# Patient Record
Sex: MALE | Race: WHITE | NOT HISPANIC OR LATINO | Employment: OTHER | URBAN - METROPOLITAN AREA
[De-identification: names, ages, dates, MRNs, and addresses within clinical notes are randomized per-mention and may not be internally consistent; named-entity substitution may affect disease eponyms.]

---

## 2020-06-08 ENCOUNTER — VIRTUAL VISIT (OUTPATIENT)
Dept: FAMILY MEDICINE | Facility: CLINIC | Age: 85
End: 2020-06-08
Payer: COMMERCIAL

## 2020-06-08 DIAGNOSIS — R60.9 DEPENDENT EDEMA: ICD-10-CM

## 2020-06-08 DIAGNOSIS — M25.571 PAIN IN JOINT INVOLVING ANKLE AND FOOT, RIGHT: Primary | ICD-10-CM

## 2020-06-08 PROCEDURE — 99213 OFFICE O/P EST LOW 20 MIN: CPT | Mod: 95 | Performed by: INTERNAL MEDICINE

## 2020-06-08 NOTE — PROGRESS NOTES
"Emigdio Hannah is a 89 year old male who is being evaluated via a billable telephone visit.      The patient has been notified of following:     \"This telephone visit will be conducted via a call between you and your physician/provider. We have found that certain health care needs can be provided without the need for a physical exam.  This service lets us provide the care you need with a short phone conversation.  If a prescription is necessary we can send it directly to your pharmacy.  If lab work is needed we can place an order for that and you can then stop by our lab to have the test done at a later time.    Telephone visits are billed at different rates depending on your insurance coverage. During this emergency period, for some insurers they may be billed the same as an in-person visit.  Please reach out to your insurance provider with any questions.    If during the course of the call the physician/provider feels a telephone visit is not appropriate, you will not be charged for this service.\"    Patient has given verbal consent for Telephone visit?  Yes    What phone number would you like to be contacted at? [phone]    How would you like to obtain your AVS? Mail a copy    Subjective     Emigdio Hannah is a 89 year old male who presents via phone visit today for the following health issues:    HPI  Joint Pain    Onset: 2 weeks    Description:   Location: right ankle  Character: Dull ache    Intensity: mild    Progression of Symptoms: same    Accompanying Signs & Symptoms:  Other symptoms: none    History:   Previous similar pain: no       Precipitating factors:   Trauma or overuse: no     Alleviating factors:  Improved by: nothing    Therapies Tried and outcome: Nothing       pain no just muscle above  Been rubbing the         Patient Active Problem List   Diagnosis     CARDIOVASCULAR SCREENING; LDL GOAL LESS THAN 160     Advanced directives, counseling/discussion     Squamous cell carcinoma in situ     " Hearing loss     Past Surgical History:   Procedure Laterality Date     CATARACT IOL, RT/LT  5-04 & 6-09    Both eyes     HERNIA REPAIR, INGUINAL RT/LT  4-93    Rt.     SURGICAL PATHOLOGY EXAM  5-9-6    Rt       Social History     Tobacco Use     Smoking status: Never Smoker     Smokeless tobacco: Never Used   Substance Use Topics     Alcohol use: Yes     Comment: once a week, a glass of wine during weekend     No family history on file.      Current Outpatient Medications   Medication Sig Dispense Refill     ascorbic acid (VITAMIN C) 500 MG tablet Take 1 tablet by mouth daily.       Aspirin (ASPIR-81 PO) Take 81 mg by mouth 4 per week.        Calcium Carbonate (CALCIUM 500 PO) Take 3 tablets by mouth daily.       cholecalciferol (VITAMIN D) 400 UNIT TABS Take 400 Units by mouth daily.       COD LIVER OIL PO Take 400 mg by mouth daily.       magnesium 250 MG tablet Take  by mouth daily. 3 per week       Multiple Vitamin (MULTI-VITAMIN) per tablet Take  by mouth daily. 4 per week       omega-3 fatty acids (FISH OIL) 1200 MG capsule Take 1 capsule by mouth daily.       PSYLLIUM PO Take  by mouth. Fiber supplement       amoxicillin (AMOXIL) 500 MG capsule Take 1 capsule (500 mg) by mouth 2 times daily (Patient not taking: Reported on 6/8/2020) 14 capsule 0     No Known Allergies  Recent Labs   Lab Test 04/10/14  0832 08/30/12  1527   LDL 96  --    HDL 59  --    TRIG 72  --    CR 1.09 1.03   GFRESTIMATED 64 68   GFRESTBLACK 77 83   POTASSIUM 4.1 4.3      BP Readings from Last 3 Encounters:   07/31/15 151/81   05/08/15 140/72   04/10/14 151/75    Wt Readings from Last 3 Encounters:   07/31/15 62.6 kg (138 lb)   05/08/15 61.1 kg (134 lb 12 oz)   04/10/14 63 kg (139 lb)                    Reviewed and updated as needed this visit by Provider         Review of Systems   Constitutional, HEENT, cardiovascular, pulmonary, gi and gu systems are negative, except as otherwise noted.       Objective   Reported vitals:  There were  no vitals taken for this visit.   healthy, alert and no distress  PSYCH: Alert and oriented times 3; coherent speech, normal   rate and volume, able to articulate logical thoughts, able   to abstract reason, no tangential thoughts, no hallucinations   or delusions  His affect is normal  RESP: No cough, no audible wheezing, able to talk in full sentences  Remainder of exam unable to be completed due to telephone visits    Diagnostic Test Results:  Labs reviewed in Epic  No results found for any visits on 06/08/20.        ICD-10-CM    1. Pain in joint involving ankle and foot, right  M25.571    2. Dependent edema  R60.9      Wrap in ace bandage, ice, elevated and compress  No obvious signs of clot, however, if worsening or increased pain in the calf. Will need ultrasound  Patient requests water pill, however at this age, non medication manangement is a safer place to start for kideny function and fall risk      Assessment/Plan:  There are no diagnoses linked to this encounter.    No follow-ups on file.      Phone call duration:  12 minutes    George Motley MD

## 2021-03-11 ENCOUNTER — IMMUNIZATION (OUTPATIENT)
Dept: NURSING | Facility: CLINIC | Age: 86
End: 2021-03-11
Payer: COMMERCIAL

## 2021-03-11 PROCEDURE — 91300 PR COVID VAC PFIZER DIL RECON 30 MCG/0.3 ML IM: CPT

## 2021-03-11 PROCEDURE — 0001A PR COVID VAC PFIZER DIL RECON 30 MCG/0.3 ML IM: CPT

## 2021-04-01 ENCOUNTER — IMMUNIZATION (OUTPATIENT)
Dept: NURSING | Facility: CLINIC | Age: 86
End: 2021-04-01
Attending: FAMILY MEDICINE
Payer: COMMERCIAL

## 2021-04-01 PROCEDURE — 91300 PR COVID VAC PFIZER DIL RECON 30 MCG/0.3 ML IM: CPT

## 2021-04-01 PROCEDURE — 0002A PR COVID VAC PFIZER DIL RECON 30 MCG/0.3 ML IM: CPT

## 2023-01-01 ENCOUNTER — NURSE TRIAGE (OUTPATIENT)
Dept: FAMILY MEDICINE | Facility: CLINIC | Age: 88
End: 2023-01-01
Payer: COMMERCIAL

## 2023-01-01 ENCOUNTER — LAB REQUISITION (OUTPATIENT)
Dept: LAB | Facility: CLINIC | Age: 88
End: 2023-01-01
Payer: COMMERCIAL

## 2023-01-01 ENCOUNTER — TELEPHONE (OUTPATIENT)
Dept: DERMATOLOGY | Facility: CLINIC | Age: 88
End: 2023-01-01
Payer: COMMERCIAL

## 2023-01-01 ENCOUNTER — HOSPITAL ENCOUNTER (OUTPATIENT)
Dept: CT IMAGING | Facility: HOSPITAL | Age: 88
Discharge: HOME | End: 2023-11-17
Payer: COMMERCIAL

## 2023-01-01 ENCOUNTER — APPOINTMENT (OUTPATIENT)
Dept: OCCUPATIONAL THERAPY | Facility: CLINIC | Age: 88
DRG: 438 | End: 2023-01-01
Payer: COMMERCIAL

## 2023-01-01 ENCOUNTER — OFFICE VISIT (OUTPATIENT)
Dept: INTERNAL MEDICINE | Facility: CLINIC | Age: 88
End: 2023-01-01
Payer: COMMERCIAL

## 2023-01-01 ENCOUNTER — APPOINTMENT (OUTPATIENT)
Dept: CT IMAGING | Facility: CLINIC | Age: 88
DRG: 438 | End: 2023-01-01
Attending: STUDENT IN AN ORGANIZED HEALTH CARE EDUCATION/TRAINING PROGRAM
Payer: COMMERCIAL

## 2023-01-01 ENCOUNTER — HOSPITAL ENCOUNTER (OUTPATIENT)
Facility: CLINIC | Age: 88
End: 2023-01-01
Attending: INTERNAL MEDICINE | Admitting: INTERNAL MEDICINE
Payer: COMMERCIAL

## 2023-01-01 ENCOUNTER — APPOINTMENT (OUTPATIENT)
Dept: GENERAL RADIOLOGY | Facility: CLINIC | Age: 88
DRG: 438 | End: 2023-01-01
Attending: INTERNAL MEDICINE
Payer: COMMERCIAL

## 2023-01-01 ENCOUNTER — APPOINTMENT (OUTPATIENT)
Dept: ULTRASOUND IMAGING | Facility: CLINIC | Age: 88
DRG: 438 | End: 2023-01-01
Attending: EMERGENCY MEDICINE
Payer: COMMERCIAL

## 2023-01-01 ENCOUNTER — APPOINTMENT (OUTPATIENT)
Dept: CARDIOLOGY | Facility: CLINIC | Age: 88
DRG: 438 | End: 2023-01-01
Payer: COMMERCIAL

## 2023-01-01 ENCOUNTER — APPOINTMENT (OUTPATIENT)
Dept: PHYSICAL THERAPY | Facility: CLINIC | Age: 88
DRG: 438 | End: 2023-01-01
Payer: COMMERCIAL

## 2023-01-01 ENCOUNTER — TELEPHONE (OUTPATIENT)
Dept: INTERNAL MEDICINE | Facility: CLINIC | Age: 88
End: 2023-01-01
Payer: COMMERCIAL

## 2023-01-01 ENCOUNTER — ANESTHESIA EVENT (OUTPATIENT)
Dept: SURGERY | Facility: CLINIC | Age: 88
DRG: 438 | End: 2023-01-01
Payer: COMMERCIAL

## 2023-01-01 ENCOUNTER — HOSPITAL ENCOUNTER (INPATIENT)
Facility: CLINIC | Age: 88
LOS: 7 days | Discharge: NURSING, REHAB OR LONG-TERM CARE | DRG: 438 | End: 2023-12-04
Attending: EMERGENCY MEDICINE | Admitting: INTERNAL MEDICINE
Payer: COMMERCIAL

## 2023-01-01 ENCOUNTER — ANESTHESIA (OUTPATIENT)
Dept: SURGERY | Facility: CLINIC | Age: 88
DRG: 438 | End: 2023-01-01
Payer: COMMERCIAL

## 2023-01-01 ENCOUNTER — PATIENT OUTREACH (OUTPATIENT)
Dept: GASTROENTEROLOGY | Facility: CLINIC | Age: 88
End: 2023-01-01
Payer: COMMERCIAL

## 2023-01-01 ENCOUNTER — APPOINTMENT (OUTPATIENT)
Dept: OCCUPATIONAL THERAPY | Facility: CLINIC | Age: 88
DRG: 438 | End: 2023-01-01
Attending: INTERNAL MEDICINE
Payer: COMMERCIAL

## 2023-01-01 ENCOUNTER — PREP FOR PROCEDURE (OUTPATIENT)
Dept: GASTROENTEROLOGY | Facility: CLINIC | Age: 88
End: 2023-01-01
Payer: COMMERCIAL

## 2023-01-01 ENCOUNTER — PATIENT OUTREACH (OUTPATIENT)
Dept: CARE COORDINATION | Facility: CLINIC | Age: 88
End: 2023-01-01
Payer: COMMERCIAL

## 2023-01-01 ENCOUNTER — APPOINTMENT (OUTPATIENT)
Dept: PHYSICAL THERAPY | Facility: CLINIC | Age: 88
DRG: 438 | End: 2023-01-01
Attending: INTERNAL MEDICINE
Payer: COMMERCIAL

## 2023-01-01 ENCOUNTER — TELEPHONE (OUTPATIENT)
Dept: FAMILY MEDICINE | Facility: CLINIC | Age: 88
End: 2023-01-01
Payer: COMMERCIAL

## 2023-01-01 ENCOUNTER — ANCILLARY ORDERS (OUTPATIENT)
Dept: INTERNAL MEDICINE | Facility: CLINIC | Age: 88
End: 2023-01-01

## 2023-01-01 ENCOUNTER — TELEPHONE (OUTPATIENT)
Dept: FAMILY MEDICINE | Facility: CLINIC | Age: 88
End: 2023-01-01

## 2023-01-01 ENCOUNTER — PATIENT OUTREACH (OUTPATIENT)
Dept: SURGERY | Facility: CLINIC | Age: 88
End: 2023-01-01
Payer: COMMERCIAL

## 2023-01-01 ENCOUNTER — PRE VISIT (OUTPATIENT)
Dept: SURGERY | Facility: CLINIC | Age: 88
End: 2023-01-01

## 2023-01-01 ENCOUNTER — APPOINTMENT (OUTPATIENT)
Dept: ULTRASOUND IMAGING | Facility: CLINIC | Age: 88
DRG: 438 | End: 2023-01-01
Payer: COMMERCIAL

## 2023-01-01 VITALS
OXYGEN SATURATION: 100 % | HEIGHT: 67 IN | SYSTOLIC BLOOD PRESSURE: 138 MMHG | WEIGHT: 124 LBS | BODY MASS INDEX: 19.46 KG/M2 | DIASTOLIC BLOOD PRESSURE: 82 MMHG | HEART RATE: 56 BPM | RESPIRATION RATE: 14 BRPM | TEMPERATURE: 96.8 F

## 2023-01-01 VITALS
TEMPERATURE: 98 F | OXYGEN SATURATION: 99 % | WEIGHT: 127 LBS | DIASTOLIC BLOOD PRESSURE: 58 MMHG | BODY MASS INDEX: 19.89 KG/M2 | SYSTOLIC BLOOD PRESSURE: 156 MMHG | HEART RATE: 71 BPM

## 2023-01-01 VITALS
SYSTOLIC BLOOD PRESSURE: 130 MMHG | BODY MASS INDEX: 20.03 KG/M2 | WEIGHT: 127.87 LBS | TEMPERATURE: 97.4 F | HEART RATE: 91 BPM | OXYGEN SATURATION: 99 % | DIASTOLIC BLOOD PRESSURE: 76 MMHG | RESPIRATION RATE: 16 BRPM

## 2023-01-01 DIAGNOSIS — R17 JAUNDICE: Primary | ICD-10-CM

## 2023-01-01 DIAGNOSIS — N40.1 BENIGN PROSTATIC HYPERPLASIA WITH URINARY OBSTRUCTION: ICD-10-CM

## 2023-01-01 DIAGNOSIS — Z23 NEED FOR TDAP VACCINATION: ICD-10-CM

## 2023-01-01 DIAGNOSIS — Z00.00 ROUTINE GENERAL MEDICAL EXAMINATION AT A HEALTH CARE FACILITY: ICD-10-CM

## 2023-01-01 DIAGNOSIS — R17 JAUNDICE: ICD-10-CM

## 2023-01-01 DIAGNOSIS — D64.89 OTHER SPECIFIED ANEMIAS: ICD-10-CM

## 2023-01-01 DIAGNOSIS — Z23 NEED FOR SHINGLES VACCINE: ICD-10-CM

## 2023-01-01 DIAGNOSIS — N13.8 BENIGN PROSTATIC HYPERPLASIA WITH URINARY OBSTRUCTION: ICD-10-CM

## 2023-01-01 DIAGNOSIS — K83.8 COMMON BILE DUCT MASS: Primary | ICD-10-CM

## 2023-01-01 DIAGNOSIS — K59.00 CONSTIPATION, UNSPECIFIED CONSTIPATION TYPE: Primary | ICD-10-CM

## 2023-01-01 DIAGNOSIS — Z29.11 NEED FOR VACCINATION AGAINST RESPIRATORY SYNCYTIAL VIRUS: ICD-10-CM

## 2023-01-01 DIAGNOSIS — R17 ELEVATED BILIRUBIN: Primary | ICD-10-CM

## 2023-01-01 DIAGNOSIS — R74.8 ABNORMAL LEVELS OF OTHER SERUM ENZYMES: ICD-10-CM

## 2023-01-01 DIAGNOSIS — I87.2 VENOUS STASIS DERMATITIS OF BOTH LOWER EXTREMITIES: ICD-10-CM

## 2023-01-01 DIAGNOSIS — R17 ELEVATED BILIRUBIN: ICD-10-CM

## 2023-01-01 DIAGNOSIS — R60.0 LOWER EXTREMITY EDEMA: ICD-10-CM

## 2023-01-01 DIAGNOSIS — R53.1 GENERALIZED WEAKNESS: ICD-10-CM

## 2023-01-01 LAB
ALBUMIN SERPL BCG-MCNC: 2.3 G/DL (ref 3.5–5.2)
ALBUMIN SERPL BCG-MCNC: 2.4 G/DL (ref 3.5–5.2)
ALBUMIN SERPL BCG-MCNC: 2.6 G/DL (ref 3.5–5.2)
ALBUMIN SERPL BCG-MCNC: 3.2 G/DL (ref 3.5–5.2)
ALBUMIN SERPL BCG-MCNC: 3.4 G/DL (ref 3.5–5.2)
ALBUMIN SERPL BCG-MCNC: 4 G/DL (ref 3.5–5.2)
ALBUMIN SERPL BCG-MCNC: 4 G/DL (ref 3.5–5.2)
ALBUMIN UR-MCNC: NEGATIVE MG/DL
ALP SERPL-CCNC: 1074 U/L (ref 40–150)
ALP SERPL-CCNC: 159 U/L (ref 40–150)
ALP SERPL-CCNC: 195 U/L (ref 40–150)
ALP SERPL-CCNC: 197 U/L (ref 40–150)
ALP SERPL-CCNC: 220 U/L (ref 40–150)
ALP SERPL-CCNC: 234 U/L (ref 40–150)
ALP SERPL-CCNC: 242 U/L (ref 40–150)
ALP SERPL-CCNC: 261 U/L (ref 40–150)
ALP SERPL-CCNC: 261 U/L (ref 40–150)
ALP SERPL-CCNC: 325 U/L (ref 40–150)
ALP SERPL-CCNC: 348 U/L (ref 40–150)
ALP SERPL-CCNC: 454 U/L (ref 40–150)
ALT SERPL W P-5'-P-CCNC: 19 U/L (ref 0–70)
ALT SERPL W P-5'-P-CCNC: 221 U/L (ref 0–70)
ALT SERPL W P-5'-P-CCNC: 27 U/L (ref 0–70)
ALT SERPL W P-5'-P-CCNC: 30 U/L (ref 0–70)
ALT SERPL W P-5'-P-CCNC: 30 U/L (ref 0–70)
ALT SERPL W P-5'-P-CCNC: 31 U/L (ref 0–70)
ALT SERPL W P-5'-P-CCNC: 34 U/L (ref 0–70)
ALT SERPL W P-5'-P-CCNC: 35 U/L (ref 0–70)
ALT SERPL W P-5'-P-CCNC: 44 U/L (ref 0–70)
ALT SERPL W P-5'-P-CCNC: 49 U/L (ref 0–70)
ALT SERPL W P-5'-P-CCNC: 55 U/L (ref 0–70)
ALT SERPL W P-5'-P-CCNC: 73 U/L (ref 0–70)
AMMONIA PLAS-SCNC: 27 UMOL/L (ref 16–60)
ANION GAP SERPL CALCULATED.3IONS-SCNC: 10 MMOL/L (ref 7–15)
ANION GAP SERPL CALCULATED.3IONS-SCNC: 11 MMOL/L (ref 7–15)
ANION GAP SERPL CALCULATED.3IONS-SCNC: 11 MMOL/L (ref 7–15)
ANION GAP SERPL CALCULATED.3IONS-SCNC: 12 MMOL/L (ref 7–15)
ANION GAP SERPL CALCULATED.3IONS-SCNC: 12 MMOL/L (ref 7–15)
ANION GAP SERPL CALCULATED.3IONS-SCNC: 15 MMOL/L (ref 7–15)
ANION GAP SERPL CALCULATED.3IONS-SCNC: 6 MMOL/L (ref 7–15)
ANION GAP SERPL CALCULATED.3IONS-SCNC: 7 MMOL/L (ref 7–15)
ANION GAP SERPL CALCULATED.3IONS-SCNC: 7 MMOL/L (ref 7–15)
ANION GAP SERPL CALCULATED.3IONS-SCNC: 8 MMOL/L (ref 7–15)
ANION GAP SERPL CALCULATED.3IONS-SCNC: 9 MMOL/L (ref 7–15)
APPEARANCE UR: CLEAR
APTT PPP: 36 SECONDS (ref 22–38)
AST SERPL W P-5'-P-CCNC: 174 U/L (ref 0–45)
AST SERPL W P-5'-P-CCNC: 25 U/L (ref 0–45)
AST SERPL W P-5'-P-CCNC: 26 U/L (ref 0–45)
AST SERPL W P-5'-P-CCNC: 27 U/L (ref 0–45)
AST SERPL W P-5'-P-CCNC: 28 U/L (ref 0–45)
AST SERPL W P-5'-P-CCNC: 29 U/L (ref 0–45)
AST SERPL W P-5'-P-CCNC: 30 U/L (ref 0–45)
AST SERPL W P-5'-P-CCNC: 30 U/L (ref 0–45)
AST SERPL W P-5'-P-CCNC: 35 U/L (ref 0–45)
AST SERPL W P-5'-P-CCNC: 44 U/L (ref 0–45)
ATRIAL RATE - MUSE: 78 BPM
ATRIAL RATE - MUSE: 81 BPM
ATRIAL RATE - MUSE: 87 BPM
BACTERIA BLD CULT: NO GROWTH
BACTERIA BLD CULT: NO GROWTH
BASOPHILS # BLD AUTO: 0 10E3/UL (ref 0–0.2)
BASOPHILS # BLD AUTO: 0.1 10E3/UL (ref 0–0.2)
BASOPHILS NFR BLD AUTO: 0 %
BASOPHILS NFR BLD AUTO: 1 %
BI-PLANE LVEF ECHO: NORMAL
BILIRUB SERPL-MCNC: 1.6 MG/DL
BILIRUB SERPL-MCNC: 18.1 MG/DL
BILIRUB SERPL-MCNC: 2.5 MG/DL
BILIRUB SERPL-MCNC: 2.5 MG/DL
BILIRUB SERPL-MCNC: 2.6 MG/DL
BILIRUB SERPL-MCNC: 2.8 MG/DL
BILIRUB SERPL-MCNC: 3.1 MG/DL
BILIRUB SERPL-MCNC: 3.6 MG/DL
BILIRUB SERPL-MCNC: 3.7 MG/DL
BILIRUB SERPL-MCNC: 4.6 MG/DL
BILIRUB SERPL-MCNC: 4.9 MG/DL
BILIRUB SERPL-MCNC: 6.4 MG/DL
BILIRUB UR QL STRIP: NEGATIVE
BUN SERPL-MCNC: 18.7 MG/DL (ref 8–23)
BUN SERPL-MCNC: 19.6 MG/DL (ref 8–23)
BUN SERPL-MCNC: 22.8 MG/DL (ref 8–23)
BUN SERPL-MCNC: 23.9 MG/DL (ref 8–23)
BUN SERPL-MCNC: 24.4 MG/DL (ref 8–23)
BUN SERPL-MCNC: 25.3 MG/DL (ref 8–23)
BUN SERPL-MCNC: 29.5 MG/DL (ref 8–23)
BUN SERPL-MCNC: 31.5 MG/DL (ref 8–23)
BUN SERPL-MCNC: 44 MG/DL (ref 8–23)
BUN SERPL-MCNC: 44.1 MG/DL (ref 8–23)
BUN SERPL-MCNC: 63.6 MG/DL (ref 8–23)
BUN SERPL-MCNC: 66.6 MG/DL (ref 8–23)
BUN SERPL-MCNC: 71.5 MG/DL (ref 8–23)
CALCIUM SERPL-MCNC: 7.6 MG/DL (ref 8.2–9.6)
CALCIUM SERPL-MCNC: 7.6 MG/DL (ref 8.2–9.6)
CALCIUM SERPL-MCNC: 7.7 MG/DL (ref 8.2–9.6)
CALCIUM SERPL-MCNC: 7.8 MG/DL (ref 8.2–9.6)
CALCIUM SERPL-MCNC: 8.2 MG/DL (ref 8.2–9.6)
CALCIUM SERPL-MCNC: 8.2 MG/DL (ref 8.2–9.6)
CALCIUM SERPL-MCNC: 8.5 MG/DL (ref 8.2–9.6)
CALCIUM SERPL-MCNC: 8.5 MG/DL (ref 8.2–9.6)
CALCIUM SERPL-MCNC: 8.6 MG/DL (ref 8.2–9.6)
CALCIUM SERPL-MCNC: 9.1 MG/DL (ref 8.2–9.6)
CALCIUM SERPL-MCNC: 9.2 MG/DL (ref 8.2–9.6)
CALCIUM SERPL-MCNC: 9.4 MG/DL (ref 8.2–9.6)
CALCIUM SERPL-MCNC: 9.7 MG/DL (ref 8.2–9.6)
CHLORIDE SERPL-SCNC: 100 MMOL/L (ref 98–107)
CHLORIDE SERPL-SCNC: 101 MMOL/L (ref 98–107)
CHLORIDE SERPL-SCNC: 102 MMOL/L (ref 98–107)
CHLORIDE SERPL-SCNC: 104 MMOL/L (ref 98–107)
CHLORIDE SERPL-SCNC: 104 MMOL/L (ref 98–107)
CHLORIDE SERPL-SCNC: 105 MMOL/L (ref 98–107)
CHLORIDE SERPL-SCNC: 106 MMOL/L (ref 98–107)
CHLORIDE SERPL-SCNC: 106 MMOL/L (ref 98–107)
CHLORIDE SERPL-SCNC: 107 MMOL/L (ref 98–107)
CHLORIDE SERPL-SCNC: 107 MMOL/L (ref 98–107)
CHLORIDE SERPL-SCNC: 108 MMOL/L (ref 98–107)
CHLORIDE SERPL-SCNC: 108 MMOL/L (ref 98–107)
CHLORIDE SERPL-SCNC: 98 MMOL/L (ref 98–107)
CHOLEST SERPL-MCNC: NORMAL MG/DL
COLOR UR AUTO: YELLOW
CREAT SERPL-MCNC: 0.75 MG/DL (ref 0.67–1.17)
CREAT SERPL-MCNC: 0.8 MG/DL (ref 0.67–1.17)
CREAT SERPL-MCNC: 0.87 MG/DL (ref 0.67–1.17)
CREAT SERPL-MCNC: 0.9 MG/DL (ref 0.67–1.17)
CREAT SERPL-MCNC: 0.91 MG/DL (ref 0.67–1.17)
CREAT SERPL-MCNC: 0.95 MG/DL (ref 0.67–1.17)
CREAT SERPL-MCNC: 0.95 MG/DL (ref 0.67–1.17)
CREAT SERPL-MCNC: 0.96 MG/DL (ref 0.67–1.17)
CREAT SERPL-MCNC: 1.34 MG/DL (ref 0.67–1.17)
CREAT SERPL-MCNC: 1.37 MG/DL (ref 0.67–1.17)
CREAT SERPL-MCNC: 2.36 MG/DL (ref 0.67–1.17)
CREAT SERPL-MCNC: 2.83 MG/DL (ref 0.67–1.17)
CREAT SERPL-MCNC: 2.93 MG/DL (ref 0.67–1.17)
DEPRECATED HCO3 PLAS-SCNC: 23 MMOL/L (ref 22–29)
DEPRECATED HCO3 PLAS-SCNC: 24 MMOL/L (ref 22–29)
DEPRECATED HCO3 PLAS-SCNC: 25 MMOL/L (ref 22–29)
DEPRECATED HCO3 PLAS-SCNC: 25 MMOL/L (ref 22–29)
DEPRECATED HCO3 PLAS-SCNC: 26 MMOL/L (ref 22–29)
DEPRECATED HCO3 PLAS-SCNC: 27 MMOL/L (ref 22–29)
DEPRECATED HCO3 PLAS-SCNC: 28 MMOL/L (ref 22–29)
DEPRECATED HCO3 PLAS-SCNC: 28 MMOL/L (ref 22–29)
DIASTOLIC BLOOD PRESSURE - MUSE: NORMAL MMHG
EGFRCR SERPLBLD CKD-EPI 2021: 19 ML/MIN/1.73M2
EGFRCR SERPLBLD CKD-EPI 2021: 20 ML/MIN/1.73M2
EGFRCR SERPLBLD CKD-EPI 2021: 24 ML/MIN/1.73M2
EGFRCR SERPLBLD CKD-EPI 2021: 47 ML/MIN/1.73M2
EGFRCR SERPLBLD CKD-EPI 2021: 48 ML/MIN/1.73M2
EGFRCR SERPLBLD CKD-EPI 2021: 72 ML/MIN/1.73M2
EGFRCR SERPLBLD CKD-EPI 2021: 73 ML/MIN/1.73M2
EGFRCR SERPLBLD CKD-EPI 2021: 73 ML/MIN/1.73M2
EGFRCR SERPLBLD CKD-EPI 2021: 77 ML/MIN/1.73M2
EGFRCR SERPLBLD CKD-EPI 2021: 78 ML/MIN/1.73M2
EGFRCR SERPLBLD CKD-EPI 2021: 78 ML/MIN/1.73M2
EGFRCR SERPLBLD CKD-EPI 2021: 80 ML/MIN/1.73M2
EGFRCR SERPLBLD CKD-EPI 2021: 82 ML/MIN/1.73M2
EOSINOPHIL # BLD AUTO: 0 10E3/UL (ref 0–0.7)
EOSINOPHIL # BLD AUTO: 0 10E3/UL (ref 0–0.7)
EOSINOPHIL # BLD AUTO: 0.2 10E3/UL (ref 0–0.7)
EOSINOPHIL # BLD AUTO: 0.5 10E3/UL (ref 0–0.7)
EOSINOPHIL NFR BLD AUTO: 0 %
EOSINOPHIL NFR BLD AUTO: 1 %
EOSINOPHIL NFR BLD AUTO: 4 %
EOSINOPHIL NFR BLD AUTO: 8 %
ERYTHROCYTE [DISTWIDTH] IN BLOOD BY AUTOMATED COUNT: 14.4 % (ref 10–15)
ERYTHROCYTE [DISTWIDTH] IN BLOOD BY AUTOMATED COUNT: 15.3 % (ref 10–15)
ERYTHROCYTE [DISTWIDTH] IN BLOOD BY AUTOMATED COUNT: 17.2 % (ref 10–15)
ERYTHROCYTE [DISTWIDTH] IN BLOOD BY AUTOMATED COUNT: 17.2 % (ref 10–15)
ERYTHROCYTE [DISTWIDTH] IN BLOOD BY AUTOMATED COUNT: 17.3 % (ref 10–15)
ERYTHROCYTE [DISTWIDTH] IN BLOOD BY AUTOMATED COUNT: 17.4 % (ref 10–15)
ERYTHROCYTE [DISTWIDTH] IN BLOOD BY AUTOMATED COUNT: 17.6 % (ref 10–15)
ERYTHROCYTE [DISTWIDTH] IN BLOOD BY AUTOMATED COUNT: 18.8 % (ref 10–15)
GLUCOSE SERPL-MCNC: 100 MG/DL (ref 70–99)
GLUCOSE SERPL-MCNC: 100 MG/DL (ref 70–99)
GLUCOSE SERPL-MCNC: 103 MG/DL (ref 70–99)
GLUCOSE SERPL-MCNC: 103 MG/DL (ref 70–99)
GLUCOSE SERPL-MCNC: 109 MG/DL (ref 70–99)
GLUCOSE SERPL-MCNC: 110 MG/DL (ref 70–99)
GLUCOSE SERPL-MCNC: 111 MG/DL (ref 70–99)
GLUCOSE SERPL-MCNC: 113 MG/DL (ref 70–99)
GLUCOSE SERPL-MCNC: 113 MG/DL (ref 70–99)
GLUCOSE SERPL-MCNC: 126 MG/DL (ref 70–99)
GLUCOSE SERPL-MCNC: 144 MG/DL (ref 70–99)
GLUCOSE SERPL-MCNC: 236 MG/DL (ref 70–99)
GLUCOSE SERPL-MCNC: 88 MG/DL (ref 70–99)
GLUCOSE UR STRIP-MCNC: NEGATIVE MG/DL
HBA1C MFR BLD: 6.1 %
HCT VFR BLD AUTO: 26.3 % (ref 40–53)
HCT VFR BLD AUTO: 27.4 % (ref 40–53)
HCT VFR BLD AUTO: 28.7 % (ref 40–53)
HCT VFR BLD AUTO: 29.8 % (ref 40–53)
HCT VFR BLD AUTO: 31.9 % (ref 40–53)
HCT VFR BLD AUTO: 31.9 % (ref 40–53)
HCT VFR BLD AUTO: 32.2 % (ref 40–53)
HCT VFR BLD AUTO: 33.5 % (ref 40–53)
HDLC SERPL-MCNC: 59 MG/DL
HGB BLD-MCNC: 10.1 G/DL (ref 13.3–17.7)
HGB BLD-MCNC: 10.4 G/DL (ref 13.3–17.7)
HGB BLD-MCNC: 11.1 G/DL (ref 13.3–17.7)
HGB BLD-MCNC: 11.3 G/DL (ref 13.3–17.7)
HGB BLD-MCNC: 8.7 G/DL (ref 13.3–17.7)
HGB BLD-MCNC: 8.8 G/DL (ref 13.3–17.7)
HGB BLD-MCNC: 9.1 G/DL (ref 13.3–17.7)
HGB BLD-MCNC: 9.8 G/DL (ref 13.3–17.7)
HGB UR QL STRIP: NEGATIVE
HOLD SPECIMEN: NORMAL
IMM GRANULOCYTES # BLD: 0 10E3/UL
IMM GRANULOCYTES # BLD: 0.1 10E3/UL
IMM GRANULOCYTES NFR BLD: 1 %
IMM GRANULOCYTES NFR BLD: 1 %
IMM GRANULOCYTES NFR BLD: 2 %
IMM GRANULOCYTES NFR BLD: 2 %
INR PPP: 1.28 (ref 0.85–1.15)
INR PPP: 1.31 (ref 0.85–1.15)
INTERPRETATION ECG - MUSE: NORMAL
KETONES UR STRIP-MCNC: NEGATIVE MG/DL
LACTATE SERPL-SCNC: 1.3 MMOL/L (ref 0.7–2)
LACTATE SERPL-SCNC: 1.9 MMOL/L (ref 0.7–2)
LDLC SERPL CALC-MCNC: NORMAL MG/DL
LEUKOCYTE ESTERASE UR QL STRIP: NEGATIVE
LIPASE SERPL-CCNC: 37 U/L (ref 13–60)
LIPASE SERPL-CCNC: 43 U/L (ref 13–60)
LYMPHOCYTES # BLD AUTO: 0.2 10E3/UL (ref 0.8–5.3)
LYMPHOCYTES # BLD AUTO: 0.6 10E3/UL (ref 0.8–5.3)
LYMPHOCYTES # BLD AUTO: 0.7 10E3/UL (ref 0.8–5.3)
LYMPHOCYTES # BLD AUTO: 0.9 10E3/UL (ref 0.8–5.3)
LYMPHOCYTES NFR BLD AUTO: 1 %
LYMPHOCYTES NFR BLD AUTO: 12 %
LYMPHOCYTES NFR BLD AUTO: 12 %
LYMPHOCYTES NFR BLD AUTO: 16 %
MAGNESIUM SERPL-MCNC: 1.7 MG/DL (ref 1.7–2.3)
MAGNESIUM SERPL-MCNC: 1.9 MG/DL (ref 1.7–2.3)
MAGNESIUM SERPL-MCNC: 2.8 MG/DL (ref 1.7–2.3)
MCH RBC QN AUTO: 29.7 PG (ref 26.5–33)
MCH RBC QN AUTO: 30.6 PG (ref 26.5–33)
MCH RBC QN AUTO: 30.7 PG (ref 26.5–33)
MCH RBC QN AUTO: 30.7 PG (ref 26.5–33)
MCH RBC QN AUTO: 30.9 PG (ref 26.5–33)
MCH RBC QN AUTO: 31 PG (ref 26.5–33)
MCH RBC QN AUTO: 31.2 PG (ref 26.5–33)
MCH RBC QN AUTO: 31.3 PG (ref 26.5–33)
MCHC RBC AUTO-ENTMCNC: 31.4 G/DL (ref 31.5–36.5)
MCHC RBC AUTO-ENTMCNC: 31.7 G/DL (ref 31.5–36.5)
MCHC RBC AUTO-ENTMCNC: 32.1 G/DL (ref 31.5–36.5)
MCHC RBC AUTO-ENTMCNC: 32.6 G/DL (ref 31.5–36.5)
MCHC RBC AUTO-ENTMCNC: 32.9 G/DL (ref 31.5–36.5)
MCHC RBC AUTO-ENTMCNC: 33.1 G/DL (ref 31.5–36.5)
MCHC RBC AUTO-ENTMCNC: 33.1 G/DL (ref 31.5–36.5)
MCHC RBC AUTO-ENTMCNC: 35.4 G/DL (ref 31.5–36.5)
MCV RBC AUTO: 87 FL (ref 78–100)
MCV RBC AUTO: 93 FL (ref 78–100)
MCV RBC AUTO: 94 FL (ref 78–100)
MCV RBC AUTO: 94 FL (ref 78–100)
MCV RBC AUTO: 95 FL (ref 78–100)
MCV RBC AUTO: 95 FL (ref 78–100)
MCV RBC AUTO: 97 FL (ref 78–100)
MCV RBC AUTO: 97 FL (ref 78–100)
MONOCYTES # BLD AUTO: 0.6 10E3/UL (ref 0–1.3)
MONOCYTES # BLD AUTO: 0.6 10E3/UL (ref 0–1.3)
MONOCYTES # BLD AUTO: 0.8 10E3/UL (ref 0–1.3)
MONOCYTES # BLD AUTO: 0.8 10E3/UL (ref 0–1.3)
MONOCYTES NFR BLD AUTO: 13 %
MONOCYTES NFR BLD AUTO: 13 %
MONOCYTES NFR BLD AUTO: 15 %
MONOCYTES NFR BLD AUTO: 4 %
MRSA DNA SPEC QL NAA+PROBE: NEGATIVE
MUCOUS THREADS #/AREA URNS LPF: PRESENT /LPF
NEUTROPHILS # BLD AUTO: 13.4 10E3/UL (ref 1.6–8.3)
NEUTROPHILS # BLD AUTO: 3.5 10E3/UL (ref 1.6–8.3)
NEUTROPHILS # BLD AUTO: 3.5 10E3/UL (ref 1.6–8.3)
NEUTROPHILS # BLD AUTO: 3.9 10E3/UL (ref 1.6–8.3)
NEUTROPHILS NFR BLD AUTO: 60 %
NEUTROPHILS NFR BLD AUTO: 66 %
NEUTROPHILS NFR BLD AUTO: 72 %
NEUTROPHILS NFR BLD AUTO: 94 %
NITRATE UR QL: NEGATIVE
NONHDLC SERPL-MCNC: NORMAL MG/DL
NRBC # BLD AUTO: 0 10E3/UL
NRBC BLD AUTO-RTO: 0 /100
P AXIS - MUSE: 68 DEGREES
P AXIS - MUSE: 80 DEGREES
P AXIS - MUSE: 83 DEGREES
PH UR STRIP: 5 [PH] (ref 5–7)
PHOSPHATE SERPL-MCNC: 1.9 MG/DL (ref 2.5–4.5)
PHOSPHATE SERPL-MCNC: 2.3 MG/DL (ref 2.5–4.5)
PHOSPHATE SERPL-MCNC: 2.4 MG/DL (ref 2.5–4.5)
PHOSPHATE SERPL-MCNC: 2.5 MG/DL (ref 2.5–4.5)
PHOSPHATE SERPL-MCNC: 2.7 MG/DL (ref 2.5–4.5)
PLATELET # BLD AUTO: 148 10E3/UL (ref 150–450)
PLATELET # BLD AUTO: 154 10E3/UL (ref 150–450)
PLATELET # BLD AUTO: 154 10E3/UL (ref 150–450)
PLATELET # BLD AUTO: 159 10E3/UL (ref 150–450)
PLATELET # BLD AUTO: 169 10E3/UL (ref 150–450)
PLATELET # BLD AUTO: 181 10E3/UL (ref 150–450)
PLATELET # BLD AUTO: 181 10E3/UL (ref 150–450)
PLATELET # BLD AUTO: 211 10E3/UL (ref 150–450)
PLATELET # BLD AUTO: 248 10E3/UL (ref 150–450)
PLATELET # BLD AUTO: 294 10E3/UL (ref 150–450)
POTASSIUM SERPL-SCNC: 3.5 MMOL/L (ref 3.4–5.3)
POTASSIUM SERPL-SCNC: 3.7 MMOL/L (ref 3.4–5.3)
POTASSIUM SERPL-SCNC: 3.7 MMOL/L (ref 3.4–5.3)
POTASSIUM SERPL-SCNC: 3.8 MMOL/L (ref 3.4–5.3)
POTASSIUM SERPL-SCNC: 4.1 MMOL/L (ref 3.4–5.3)
POTASSIUM SERPL-SCNC: 4.2 MMOL/L (ref 3.4–5.3)
POTASSIUM SERPL-SCNC: 4.2 MMOL/L (ref 3.4–5.3)
POTASSIUM SERPL-SCNC: 4.3 MMOL/L (ref 3.4–5.3)
POTASSIUM SERPL-SCNC: 4.3 MMOL/L (ref 3.4–5.3)
POTASSIUM SERPL-SCNC: 4.4 MMOL/L (ref 3.4–5.3)
POTASSIUM SERPL-SCNC: 4.5 MMOL/L (ref 3.4–5.3)
POTASSIUM SERPL-SCNC: 4.8 MMOL/L (ref 3.4–5.3)
POTASSIUM SERPL-SCNC: 5 MMOL/L (ref 3.4–5.3)
PR INTERVAL - MUSE: 134 MS
PR INTERVAL - MUSE: 148 MS
PR INTERVAL - MUSE: 158 MS
PROT SERPL-MCNC: 4.6 G/DL (ref 6.4–8.3)
PROT SERPL-MCNC: 4.8 G/DL (ref 6.4–8.3)
PROT SERPL-MCNC: 4.8 G/DL (ref 6.4–8.3)
PROT SERPL-MCNC: 4.9 G/DL (ref 6.4–8.3)
PROT SERPL-MCNC: 4.9 G/DL (ref 6.4–8.3)
PROT SERPL-MCNC: 5.2 G/DL (ref 6.4–8.3)
PROT SERPL-MCNC: 5.3 G/DL (ref 6.4–8.3)
PROT SERPL-MCNC: 5.5 G/DL (ref 6.4–8.3)
PROT SERPL-MCNC: 6.3 G/DL (ref 6.4–8.3)
PROT SERPL-MCNC: 6.6 G/DL (ref 6.4–8.3)
PROT SERPL-MCNC: 8 G/DL (ref 6.4–8.3)
PROT SERPL-MCNC: ABNORMAL G/DL
QRS DURATION - MUSE: 120 MS
QRS DURATION - MUSE: 138 MS
QRS DURATION - MUSE: 142 MS
QT - MUSE: 420 MS
QT - MUSE: 432 MS
QT - MUSE: 448 MS
QTC - MUSE: 501 MS
QTC - MUSE: 505 MS
QTC - MUSE: 510 MS
R AXIS - MUSE: -61 DEGREES
R AXIS - MUSE: -64 DEGREES
R AXIS - MUSE: -64 DEGREES
RBC # BLD AUTO: 2.82 10E6/UL (ref 4.4–5.9)
RBC # BLD AUTO: 2.83 10E6/UL (ref 4.4–5.9)
RBC # BLD AUTO: 2.97 10E6/UL (ref 4.4–5.9)
RBC # BLD AUTO: 3.16 10E6/UL (ref 4.4–5.9)
RBC # BLD AUTO: 3.37 10E6/UL (ref 4.4–5.9)
RBC # BLD AUTO: 3.4 10E6/UL (ref 4.4–5.9)
RBC # BLD AUTO: 3.55 10E6/UL (ref 4.4–5.9)
RBC # BLD AUTO: 3.68 10E6/UL (ref 4.4–5.9)
RBC URINE: 0 /HPF
SA TARGET DNA: POSITIVE
SODIUM SERPL-SCNC: 136 MMOL/L (ref 135–145)
SODIUM SERPL-SCNC: 136 MMOL/L (ref 135–145)
SODIUM SERPL-SCNC: 137 MMOL/L (ref 135–145)
SODIUM SERPL-SCNC: 137 MMOL/L (ref 135–145)
SODIUM SERPL-SCNC: 139 MMOL/L (ref 135–145)
SODIUM SERPL-SCNC: 139 MMOL/L (ref 135–145)
SODIUM SERPL-SCNC: 141 MMOL/L (ref 135–145)
SODIUM SERPL-SCNC: 142 MMOL/L (ref 135–145)
SODIUM SERPL-SCNC: 142 MMOL/L (ref 135–145)
SODIUM SERPL-SCNC: 143 MMOL/L (ref 135–145)
SODIUM SERPL-SCNC: 144 MMOL/L (ref 135–145)
SP GR UR STRIP: 1.02 (ref 1–1.03)
SYSTOLIC BLOOD PRESSURE - MUSE: NORMAL MMHG
T AXIS - MUSE: 31 DEGREES
T AXIS - MUSE: 82 DEGREES
T AXIS - MUSE: 97 DEGREES
TRIGL SERPL-MCNC: NORMAL MG/DL
TROPONIN T SERPL HS-MCNC: 35 NG/L
TROPONIN T SERPL HS-MCNC: 36 NG/L
UPPER EUS: NORMAL
UROBILINOGEN UR STRIP-MCNC: NORMAL MG/DL
VANCOMYCIN SERPL-MCNC: 6.4 UG/ML
VENTRICULAR RATE- MUSE: 78 BPM
VENTRICULAR RATE- MUSE: 81 BPM
VENTRICULAR RATE- MUSE: 87 BPM
WBC # BLD AUTO: 11.1 10E3/UL (ref 4–11)
WBC # BLD AUTO: 12 10E3/UL (ref 4–11)
WBC # BLD AUTO: 12.4 10E3/UL (ref 4–11)
WBC # BLD AUTO: 12.6 10E3/UL (ref 4–11)
WBC # BLD AUTO: 14.3 10E3/UL (ref 4–11)
WBC # BLD AUTO: 4.7 10E3/UL (ref 4–11)
WBC # BLD AUTO: 5.7 10E3/UL (ref 4–11)
WBC # BLD AUTO: 5.8 10E3/UL (ref 4–11)
WBC URINE: 1 /HPF

## 2023-01-01 PROCEDURE — 360N000075 HC SURGERY LEVEL 2, PER MIN: Performed by: INTERNAL MEDICINE

## 2023-01-01 PROCEDURE — 93010 ELECTROCARDIOGRAM REPORT: CPT | Performed by: INTERNAL MEDICINE

## 2023-01-01 PROCEDURE — 80053 COMPREHEN METABOLIC PANEL: CPT | Performed by: EMERGENCY MEDICINE

## 2023-01-01 PROCEDURE — 258N000003 HC RX IP 258 OP 636: Performed by: STUDENT IN AN ORGANIZED HEALTH CARE EDUCATION/TRAINING PROGRAM

## 2023-01-01 PROCEDURE — 250N000013 HC RX MED GY IP 250 OP 250 PS 637: Performed by: STUDENT IN AN ORGANIZED HEALTH CARE EDUCATION/TRAINING PROGRAM

## 2023-01-01 PROCEDURE — 97530 THERAPEUTIC ACTIVITIES: CPT | Mod: GP | Performed by: PHYSICAL THERAPIST

## 2023-01-01 PROCEDURE — 97165 OT EVAL LOW COMPLEX 30 MIN: CPT | Mod: GO

## 2023-01-01 PROCEDURE — 36415 COLL VENOUS BLD VENIPUNCTURE: CPT

## 2023-01-01 PROCEDURE — 99207 PR APP CREDIT; MD BILLING SHARED VISIT: CPT | Mod: FS | Performed by: STUDENT IN AN ORGANIZED HEALTH CARE EDUCATION/TRAINING PROGRAM

## 2023-01-01 PROCEDURE — 99239 HOSP IP/OBS DSCHRG MGMT >30: CPT | Performed by: PHYSICIAN ASSISTANT

## 2023-01-01 PROCEDURE — 250N000011 HC RX IP 250 OP 636: Mod: JZ | Performed by: STUDENT IN AN ORGANIZED HEALTH CARE EDUCATION/TRAINING PROGRAM

## 2023-01-01 PROCEDURE — 84460 ALANINE AMINO (ALT) (SGPT): CPT | Performed by: DIETITIAN, REGISTERED

## 2023-01-01 PROCEDURE — 36415 COLL VENOUS BLD VENIPUNCTURE: CPT | Performed by: EMERGENCY MEDICINE

## 2023-01-01 PROCEDURE — G0463 HOSPITAL OUTPT CLINIC VISIT: HCPCS

## 2023-01-01 PROCEDURE — 250N000011 HC RX IP 250 OP 636

## 2023-01-01 PROCEDURE — 85049 AUTOMATED PLATELET COUNT: CPT | Performed by: INTERNAL MEDICINE

## 2023-01-01 PROCEDURE — 82247 BILIRUBIN TOTAL: CPT

## 2023-01-01 PROCEDURE — 120N000005 HC R&B MS OVERFLOW UMMC

## 2023-01-01 PROCEDURE — 258N000003 HC RX IP 258 OP 636: Performed by: INTERNAL MEDICINE

## 2023-01-01 PROCEDURE — 74176 CT ABD & PELVIS W/O CONTRAST: CPT

## 2023-01-01 PROCEDURE — 83735 ASSAY OF MAGNESIUM: CPT | Performed by: EMERGENCY MEDICINE

## 2023-01-01 PROCEDURE — 272N000001 HC OR GENERAL SUPPLY STERILE: Performed by: INTERNAL MEDICINE

## 2023-01-01 PROCEDURE — 85610 PROTHROMBIN TIME: CPT | Performed by: INTERNAL MEDICINE

## 2023-01-01 PROCEDURE — 87641 MR-STAPH DNA AMP PROBE: CPT

## 2023-01-01 PROCEDURE — 255N000002 HC RX 255 OP 636: Performed by: INTERNAL MEDICINE

## 2023-01-01 PROCEDURE — 93005 ELECTROCARDIOGRAM TRACING: CPT

## 2023-01-01 PROCEDURE — 84100 ASSAY OF PHOSPHORUS: CPT | Performed by: HOSPITALIST

## 2023-01-01 PROCEDURE — 99285 EMERGENCY DEPT VISIT HI MDM: CPT | Mod: 25 | Performed by: EMERGENCY MEDICINE

## 2023-01-01 PROCEDURE — 97162 PT EVAL MOD COMPLEX 30 MIN: CPT | Mod: GP | Performed by: PHYSICAL THERAPIST

## 2023-01-01 PROCEDURE — 81001 URINALYSIS AUTO W/SCOPE: CPT | Performed by: EMERGENCY MEDICINE

## 2023-01-01 PROCEDURE — 93970 EXTREMITY STUDY: CPT | Mod: 26 | Performed by: RADIOLOGY

## 2023-01-01 PROCEDURE — 250N000009 HC RX 250: Performed by: ANESTHESIOLOGY

## 2023-01-01 PROCEDURE — 93306 TTE W/DOPPLER COMPLETE: CPT | Mod: 26 | Performed by: INTERNAL MEDICINE

## 2023-01-01 PROCEDURE — 82040 ASSAY OF SERUM ALBUMIN: CPT | Performed by: REGISTERED NURSE

## 2023-01-01 PROCEDURE — 84100 ASSAY OF PHOSPHORUS: CPT | Performed by: PHYSICIAN ASSISTANT

## 2023-01-01 PROCEDURE — 83735 ASSAY OF MAGNESIUM: CPT | Performed by: PHYSICIAN ASSISTANT

## 2023-01-01 PROCEDURE — 84484 ASSAY OF TROPONIN QUANT: CPT | Performed by: PHYSICIAN ASSISTANT

## 2023-01-01 PROCEDURE — 250N000011 HC RX IP 250 OP 636: Performed by: STUDENT IN AN ORGANIZED HEALTH CARE EDUCATION/TRAINING PROGRAM

## 2023-01-01 PROCEDURE — 93976 VASCULAR STUDY: CPT

## 2023-01-01 PROCEDURE — 97530 THERAPEUTIC ACTIVITIES: CPT | Mod: GO

## 2023-01-01 PROCEDURE — 999N000128 HC STATISTIC PERIPHERAL IV START W/O US GUIDANCE

## 2023-01-01 PROCEDURE — 85027 COMPLETE CBC AUTOMATED: CPT | Performed by: DIETITIAN, REGISTERED

## 2023-01-01 PROCEDURE — 36415 COLL VENOUS BLD VENIPUNCTURE: CPT | Performed by: REGISTERED NURSE

## 2023-01-01 PROCEDURE — 82040 ASSAY OF SERUM ALBUMIN: CPT

## 2023-01-01 PROCEDURE — 99222 1ST HOSP IP/OBS MODERATE 55: CPT | Mod: GC | Performed by: INTERNAL MEDICINE

## 2023-01-01 PROCEDURE — 99207 PR NON-BILLABLE SERV PER CHARTING: CPT | Performed by: INTERNAL MEDICINE

## 2023-01-01 PROCEDURE — 250N000013 HC RX MED GY IP 250 OP 250 PS 637: Performed by: INTERNAL MEDICINE

## 2023-01-01 PROCEDURE — 80061 LIPID PANEL: CPT

## 2023-01-01 PROCEDURE — 87040 BLOOD CULTURE FOR BACTERIA: CPT

## 2023-01-01 PROCEDURE — P9604 ONE-WAY ALLOW PRORATED TRIP: HCPCS | Performed by: REGISTERED NURSE

## 2023-01-01 PROCEDURE — 250N000013 HC RX MED GY IP 250 OP 250 PS 637

## 2023-01-01 PROCEDURE — 84100 ASSAY OF PHOSPHORUS: CPT | Performed by: STUDENT IN AN ORGANIZED HEALTH CARE EDUCATION/TRAINING PROGRAM

## 2023-01-01 PROCEDURE — 250N000011 HC RX IP 250 OP 636: Performed by: EMERGENCY MEDICINE

## 2023-01-01 PROCEDURE — 80202 ASSAY OF VANCOMYCIN: CPT | Performed by: INTERNAL MEDICINE

## 2023-01-01 PROCEDURE — 93976 VASCULAR STUDY: CPT | Mod: 26 | Performed by: RADIOLOGY

## 2023-01-01 PROCEDURE — 36415 COLL VENOUS BLD VENIPUNCTURE: CPT | Performed by: INTERNAL MEDICINE

## 2023-01-01 PROCEDURE — 97535 SELF CARE MNGMENT TRAINING: CPT | Mod: GO

## 2023-01-01 PROCEDURE — 99233 SBSQ HOSP IP/OBS HIGH 50: CPT | Mod: FS | Performed by: STUDENT IN AN ORGANIZED HEALTH CARE EDUCATION/TRAINING PROGRAM

## 2023-01-01 PROCEDURE — 83735 ASSAY OF MAGNESIUM: CPT

## 2023-01-01 PROCEDURE — 80069 RENAL FUNCTION PANEL: CPT

## 2023-01-01 PROCEDURE — 99223 1ST HOSP IP/OBS HIGH 75: CPT | Mod: GC | Performed by: INTERNAL MEDICINE

## 2023-01-01 PROCEDURE — 85025 COMPLETE CBC W/AUTO DIFF WBC: CPT

## 2023-01-01 PROCEDURE — 258N000003 HC RX IP 258 OP 636

## 2023-01-01 PROCEDURE — 99233 SBSQ HOSP IP/OBS HIGH 50: CPT | Performed by: STUDENT IN AN ORGANIZED HEALTH CARE EDUCATION/TRAINING PROGRAM

## 2023-01-01 PROCEDURE — 99418 PROLNG IP/OBS E/M EA 15 MIN: CPT

## 2023-01-01 PROCEDURE — 84450 TRANSFERASE (AST) (SGOT): CPT

## 2023-01-01 PROCEDURE — 258N000003 HC RX IP 258 OP 636: Performed by: EMERGENCY MEDICINE

## 2023-01-01 PROCEDURE — 999N000208 ECHOCARDIOGRAM COMPLETE

## 2023-01-01 PROCEDURE — 83036 HEMOGLOBIN GLYCOSYLATED A1C: CPT

## 2023-01-01 PROCEDURE — 99233 SBSQ HOSP IP/OBS HIGH 50: CPT

## 2023-01-01 PROCEDURE — 255N000002 HC RX 255 OP 636: Mod: JZ | Performed by: INTERNAL MEDICINE

## 2023-01-01 PROCEDURE — G0463 HOSPITAL OUTPT CLINIC VISIT: HCPCS | Mod: 25

## 2023-01-01 PROCEDURE — C1726 CATH, BAL DIL, NON-VASCULAR: HCPCS | Performed by: INTERNAL MEDICINE

## 2023-01-01 PROCEDURE — 76705 ECHO EXAM OF ABDOMEN: CPT | Mod: 26 | Performed by: RADIOLOGY

## 2023-01-01 PROCEDURE — 84450 TRANSFERASE (AST) (SGOT): CPT | Performed by: INTERNAL MEDICINE

## 2023-01-01 PROCEDURE — 99207 PR NO BILLABLE SERVICE THIS VISIT: CPT | Performed by: PHYSICIAN ASSISTANT

## 2023-01-01 PROCEDURE — 83605 ASSAY OF LACTIC ACID: CPT | Performed by: EMERGENCY MEDICINE

## 2023-01-01 PROCEDURE — C2617 STENT, NON-COR, TEM W/O DEL: HCPCS | Performed by: INTERNAL MEDICINE

## 2023-01-01 PROCEDURE — 99204 OFFICE O/P NEW MOD 45 MIN: CPT

## 2023-01-01 PROCEDURE — 999N000141 HC STATISTIC PRE-PROCEDURE NURSING ASSESSMENT: Performed by: INTERNAL MEDICINE

## 2023-01-01 PROCEDURE — 97530 THERAPEUTIC ACTIVITIES: CPT | Mod: GP

## 2023-01-01 PROCEDURE — 36415 COLL VENOUS BLD VENIPUNCTURE: CPT | Performed by: PHYSICIAN ASSISTANT

## 2023-01-01 PROCEDURE — 82140 ASSAY OF AMMONIA: CPT | Performed by: EMERGENCY MEDICINE

## 2023-01-01 PROCEDURE — 85027 COMPLETE CBC AUTOMATED: CPT | Performed by: INTERNAL MEDICINE

## 2023-01-01 PROCEDURE — 370N000017 HC ANESTHESIA TECHNICAL FEE, PER MIN: Performed by: INTERNAL MEDICINE

## 2023-01-01 PROCEDURE — 80053 COMPREHEN METABOLIC PANEL: CPT

## 2023-01-01 PROCEDURE — 999N000181 XR SURGERY CARM FLUORO GREATER THAN 5 MIN W STILLS: Mod: TC

## 2023-01-01 PROCEDURE — 84484 ASSAY OF TROPONIN QUANT: CPT

## 2023-01-01 PROCEDURE — 85025 COMPLETE CBC W/AUTO DIFF WBC: CPT | Performed by: REGISTERED NURSE

## 2023-01-01 PROCEDURE — 80048 BASIC METABOLIC PNL TOTAL CA: CPT

## 2023-01-01 PROCEDURE — 84075 ASSAY ALKALINE PHOSPHATASE: CPT

## 2023-01-01 PROCEDURE — 83605 ASSAY OF LACTIC ACID: CPT | Performed by: STUDENT IN AN ORGANIZED HEALTH CARE EDUCATION/TRAINING PROGRAM

## 2023-01-01 PROCEDURE — 99232 SBSQ HOSP IP/OBS MODERATE 35: CPT | Mod: GC | Performed by: DERMATOLOGY

## 2023-01-01 PROCEDURE — 87640 STAPH A DNA AMP PROBE: CPT

## 2023-01-01 PROCEDURE — 84460 ALANINE AMINO (ALT) (SGPT): CPT | Performed by: INTERNAL MEDICINE

## 2023-01-01 PROCEDURE — 250N000009 HC RX 250: Performed by: STUDENT IN AN ORGANIZED HEALTH CARE EDUCATION/TRAINING PROGRAM

## 2023-01-01 PROCEDURE — 36415 COLL VENOUS BLD VENIPUNCTURE: CPT | Performed by: STUDENT IN AN ORGANIZED HEALTH CARE EDUCATION/TRAINING PROGRAM

## 2023-01-01 PROCEDURE — 99221 1ST HOSP IP/OBS SF/LOW 40: CPT | Mod: GC | Performed by: DERMATOLOGY

## 2023-01-01 PROCEDURE — 710N000010 HC RECOVERY PHASE 1, LEVEL 2, PER MIN: Performed by: INTERNAL MEDICINE

## 2023-01-01 PROCEDURE — 85730 THROMBOPLASTIN TIME PARTIAL: CPT

## 2023-01-01 PROCEDURE — 85025 COMPLETE CBC W/AUTO DIFF WBC: CPT | Performed by: EMERGENCY MEDICINE

## 2023-01-01 PROCEDURE — 85610 PROTHROMBIN TIME: CPT

## 2023-01-01 PROCEDURE — 74176 CT ABD & PELVIS W/O CONTRAST: CPT | Mod: 26 | Performed by: RADIOLOGY

## 2023-01-01 PROCEDURE — 93970 EXTREMITY STUDY: CPT

## 2023-01-01 PROCEDURE — 83690 ASSAY OF LIPASE: CPT | Performed by: EMERGENCY MEDICINE

## 2023-01-01 PROCEDURE — 99231 SBSQ HOSP IP/OBS SF/LOW 25: CPT | Mod: GC | Performed by: INTERNAL MEDICINE

## 2023-01-01 PROCEDURE — 99214 OFFICE O/P EST MOD 30 MIN: CPT

## 2023-01-01 PROCEDURE — 74177 CT ABD & PELVIS W/CONTRAST: CPT

## 2023-01-01 PROCEDURE — 82374 ASSAY BLOOD CARBON DIOXIDE: CPT

## 2023-01-01 PROCEDURE — 250N000025 HC SEVOFLURANE, PER MIN: Performed by: INTERNAL MEDICINE

## 2023-01-01 PROCEDURE — 85027 COMPLETE CBC AUTOMATED: CPT

## 2023-01-01 PROCEDURE — 83690 ASSAY OF LIPASE: CPT | Performed by: INTERNAL MEDICINE

## 2023-01-01 PROCEDURE — 999N000197 HC STATISTIC WOC PT EDUCATION, 0-15 MIN

## 2023-01-01 PROCEDURE — 258N000003 HC RX IP 258 OP 636: Performed by: ANESTHESIOLOGY

## 2023-01-01 PROCEDURE — 250N000011 HC RX IP 250 OP 636: Mod: JZ | Performed by: INTERNAL MEDICINE

## 2023-01-01 PROCEDURE — 84460 ALANINE AMINO (ALT) (SGPT): CPT

## 2023-01-01 PROCEDURE — 84155 ASSAY OF PROTEIN SERUM: CPT | Performed by: DIETITIAN, REGISTERED

## 2023-01-01 PROCEDURE — C1874 STENT, COATED/COV W/DEL SYS: HCPCS | Performed by: INTERNAL MEDICINE

## 2023-01-01 PROCEDURE — 250N000011 HC RX IP 250 OP 636: Mod: JZ

## 2023-01-01 PROCEDURE — 0F9480Z DRAINAGE OF GALLBLADDER WITH DRAINAGE DEVICE, VIA NATURAL OR ARTIFICIAL OPENING ENDOSCOPIC: ICD-10-PCS | Performed by: INTERNAL MEDICINE

## 2023-01-01 PROCEDURE — 99285 EMERGENCY DEPT VISIT HI MDM: CPT | Performed by: EMERGENCY MEDICINE

## 2023-01-01 PROCEDURE — 250N000011 HC RX IP 250 OP 636: Performed by: ANESTHESIOLOGY

## 2023-01-01 PROCEDURE — 85027 COMPLETE CBC AUTOMATED: CPT | Performed by: STUDENT IN AN ORGANIZED HEALTH CARE EDUCATION/TRAINING PROGRAM

## 2023-01-01 DEVICE — STENT ZIMMON BILIARY 07FRX03CM DBL PIGTAIL ZSO-7-3 G22159: Type: IMPLANTABLE DEVICE | Site: STOMACH | Status: FUNCTIONAL

## 2023-01-01 DEVICE — STENT AND ELECTROCAUTERY - ENHANCED DELIVERY SYSTEM
Type: IMPLANTABLE DEVICE | Site: BILE DUCT | Status: FUNCTIONAL
Brand: AXIOS™

## 2023-01-01 RX ORDER — EPHEDRINE SULFATE 50 MG/ML
INJECTION, SOLUTION INTRAMUSCULAR; INTRAVENOUS; SUBCUTANEOUS PRN
Status: DISCONTINUED | OUTPATIENT
Start: 2023-01-01 | End: 2023-01-01

## 2023-01-01 RX ORDER — TAMSULOSIN HYDROCHLORIDE 0.4 MG/1
0.4 CAPSULE ORAL DAILY
Status: DISCONTINUED | OUTPATIENT
Start: 2023-01-01 | End: 2023-01-01

## 2023-01-01 RX ORDER — POLYETHYLENE GLYCOL 3350 17 G/17G
17 POWDER, FOR SOLUTION ORAL DAILY
DISCHARGE
Start: 2023-01-01

## 2023-01-01 RX ORDER — MULTIPLE VITAMINS W/ MINERALS TAB 9MG-400MCG
1 TAB ORAL DAILY
DISCHARGE
Start: 2023-01-01 | End: 2023-01-01

## 2023-01-01 RX ORDER — IOPAMIDOL 510 MG/ML
INJECTION, SOLUTION INTRAVASCULAR PRN
Status: DISCONTINUED | OUTPATIENT
Start: 2023-01-01 | End: 2023-01-01 | Stop reason: HOSPADM

## 2023-01-01 RX ORDER — MULTIVITAMIN WITH IRON
1 TABLET ORAL DAILY
DISCHARGE
Start: 2023-01-01

## 2023-01-01 RX ORDER — HYDROMORPHONE HCL IN WATER/PF 6 MG/30 ML
0.4 PATIENT CONTROLLED ANALGESIA SYRINGE INTRAVENOUS EVERY 5 MIN PRN
Status: DISCONTINUED | OUTPATIENT
Start: 2023-01-01 | End: 2023-01-01 | Stop reason: HOSPADM

## 2023-01-01 RX ORDER — FLUMAZENIL 0.1 MG/ML
0.2 INJECTION, SOLUTION INTRAVENOUS
Status: DISCONTINUED | OUTPATIENT
Start: 2023-01-01 | End: 2023-01-01

## 2023-01-01 RX ORDER — LIDOCAINE 40 MG/G
CREAM TOPICAL
Status: DISCONTINUED | OUTPATIENT
Start: 2023-01-01 | End: 2023-01-01 | Stop reason: HOSPADM

## 2023-01-01 RX ORDER — TAMSULOSIN HYDROCHLORIDE 0.4 MG/1
0.4 CAPSULE ORAL EVERY EVENING
Status: DISCONTINUED | OUTPATIENT
Start: 2023-01-01 | End: 2023-01-01 | Stop reason: HOSPADM

## 2023-01-01 RX ORDER — SODIUM CHLORIDE, SODIUM LACTATE, POTASSIUM CHLORIDE, CALCIUM CHLORIDE 600; 310; 30; 20 MG/100ML; MG/100ML; MG/100ML; MG/100ML
INJECTION, SOLUTION INTRAVENOUS CONTINUOUS PRN
Status: DISCONTINUED | OUTPATIENT
Start: 2023-01-01 | End: 2023-01-01

## 2023-01-01 RX ORDER — MULTIVITAMIN WITH IRON
1 TABLET ORAL DAILY
DISCHARGE
Start: 2023-01-01 | End: 2023-01-01

## 2023-01-01 RX ORDER — TRIAMCINOLONE ACETONIDE 1 MG/G
OINTMENT TOPICAL 2 TIMES DAILY
Status: DISCONTINUED | OUTPATIENT
Start: 2023-01-01 | End: 2023-01-01 | Stop reason: HOSPADM

## 2023-01-01 RX ORDER — ONDANSETRON 4 MG/1
4 TABLET, ORALLY DISINTEGRATING ORAL EVERY 30 MIN PRN
Status: DISCONTINUED | OUTPATIENT
Start: 2023-01-01 | End: 2023-01-01 | Stop reason: HOSPADM

## 2023-01-01 RX ORDER — POLYETHYLENE GLYCOL 3350 17 G/17G
17 POWDER, FOR SOLUTION ORAL DAILY
Status: CANCELLED | DISCHARGE
Start: 2023-01-01

## 2023-01-01 RX ORDER — SODIUM CHLORIDE 9 MG/ML
INJECTION, SOLUTION INTRAVENOUS CONTINUOUS
Status: DISCONTINUED | OUTPATIENT
Start: 2023-01-01 | End: 2023-01-01 | Stop reason: HOSPADM

## 2023-01-01 RX ORDER — LEVOFLOXACIN 5 MG/ML
INJECTION, SOLUTION INTRAVENOUS PRN
Status: DISCONTINUED | OUTPATIENT
Start: 2023-01-01 | End: 2023-01-01

## 2023-01-01 RX ORDER — NALOXONE HYDROCHLORIDE 0.4 MG/ML
0.4 INJECTION, SOLUTION INTRAMUSCULAR; INTRAVENOUS; SUBCUTANEOUS
Status: DISCONTINUED | OUTPATIENT
Start: 2023-01-01 | End: 2023-01-01 | Stop reason: HOSPADM

## 2023-01-01 RX ORDER — HEPARIN SODIUM 5000 [USP'U]/.5ML
5000 INJECTION, SOLUTION INTRAVENOUS; SUBCUTANEOUS EVERY 8 HOURS
Status: DISCONTINUED | OUTPATIENT
Start: 2023-01-01 | End: 2023-01-01

## 2023-01-01 RX ORDER — OXYCODONE HYDROCHLORIDE 10 MG/1
10 TABLET ORAL
Status: DISCONTINUED | OUTPATIENT
Start: 2023-01-01 | End: 2023-01-01 | Stop reason: HOSPADM

## 2023-01-01 RX ORDER — DIPHENHYDRAMINE HCL 12.5MG/5ML
12.5 LIQUID (ML) ORAL EVERY 6 HOURS PRN
Status: DISCONTINUED | OUTPATIENT
Start: 2023-01-01 | End: 2023-01-01 | Stop reason: HOSPADM

## 2023-01-01 RX ORDER — AMOXICILLIN 250 MG
2 CAPSULE ORAL 2 TIMES DAILY PRN
Status: DISCONTINUED | OUTPATIENT
Start: 2023-01-01 | End: 2023-01-01 | Stop reason: HOSPADM

## 2023-01-01 RX ORDER — SODIUM CHLORIDE, SODIUM LACTATE, POTASSIUM CHLORIDE, CALCIUM CHLORIDE 600; 310; 30; 20 MG/100ML; MG/100ML; MG/100ML; MG/100ML
INJECTION, SOLUTION INTRAVENOUS CONTINUOUS
Status: DISCONTINUED | OUTPATIENT
Start: 2023-01-01 | End: 2023-01-01 | Stop reason: HOSPADM

## 2023-01-01 RX ORDER — POLYETHYLENE GLYCOL 3350 17 G/17G
17 POWDER, FOR SOLUTION ORAL 2 TIMES DAILY
Status: DISCONTINUED | OUTPATIENT
Start: 2023-01-01 | End: 2023-01-01 | Stop reason: HOSPADM

## 2023-01-01 RX ORDER — ONDANSETRON 2 MG/ML
INJECTION INTRAMUSCULAR; INTRAVENOUS PRN
Status: DISCONTINUED | OUTPATIENT
Start: 2023-01-01 | End: 2023-01-01

## 2023-01-01 RX ORDER — ONDANSETRON 4 MG/1
4 TABLET, ORALLY DISINTEGRATING ORAL EVERY 6 HOURS PRN
Status: DISCONTINUED | OUTPATIENT
Start: 2023-01-01 | End: 2023-01-01 | Stop reason: HOSPADM

## 2023-01-01 RX ORDER — FENTANYL CITRATE 50 UG/ML
INJECTION, SOLUTION INTRAMUSCULAR; INTRAVENOUS PRN
Status: DISCONTINUED | OUTPATIENT
Start: 2023-01-01 | End: 2023-01-01

## 2023-01-01 RX ORDER — ONDANSETRON 2 MG/ML
4 INJECTION INTRAMUSCULAR; INTRAVENOUS EVERY 30 MIN PRN
Status: DISCONTINUED | OUTPATIENT
Start: 2023-01-01 | End: 2023-01-01 | Stop reason: HOSPADM

## 2023-01-01 RX ORDER — TRIAMCINOLONE ACETONIDE 0.25 MG/G
OINTMENT TOPICAL
DISCHARGE
Start: 2023-01-01

## 2023-01-01 RX ORDER — OXYCODONE HYDROCHLORIDE 5 MG/1
5 TABLET ORAL
Status: DISCONTINUED | OUTPATIENT
Start: 2023-01-01 | End: 2023-01-01 | Stop reason: HOSPADM

## 2023-01-01 RX ORDER — DIPHENHYDRAMINE HYDROCHLORIDE 50 MG/ML
12.5 INJECTION INTRAMUSCULAR; INTRAVENOUS EVERY 6 HOURS PRN
Status: DISCONTINUED | OUTPATIENT
Start: 2023-01-01 | End: 2023-01-01 | Stop reason: HOSPADM

## 2023-01-01 RX ORDER — LIDOCAINE HYDROCHLORIDE 20 MG/ML
INJECTION, SOLUTION INFILTRATION; PERINEURAL PRN
Status: DISCONTINUED | OUTPATIENT
Start: 2023-01-01 | End: 2023-01-01

## 2023-01-01 RX ORDER — AMOXICILLIN 250 MG
1 CAPSULE ORAL 2 TIMES DAILY PRN
Status: DISCONTINUED | OUTPATIENT
Start: 2023-01-01 | End: 2023-01-01 | Stop reason: HOSPADM

## 2023-01-01 RX ORDER — DEXAMETHASONE SODIUM PHOSPHATE 4 MG/ML
4 INJECTION, SOLUTION INTRA-ARTICULAR; INTRALESIONAL; INTRAMUSCULAR; INTRAVENOUS; SOFT TISSUE
Status: DISCONTINUED | OUTPATIENT
Start: 2023-01-01 | End: 2023-01-01 | Stop reason: HOSPADM

## 2023-01-01 RX ORDER — CEFTRIAXONE 1 G/1
1 INJECTION, POWDER, FOR SOLUTION INTRAMUSCULAR; INTRAVENOUS EVERY 24 HOURS
Status: DISCONTINUED | OUTPATIENT
Start: 2023-01-01 | End: 2023-01-01

## 2023-01-01 RX ORDER — IOPAMIDOL 755 MG/ML
90 INJECTION, SOLUTION INTRAVASCULAR ONCE
Status: COMPLETED | OUTPATIENT
Start: 2023-01-01 | End: 2023-01-01

## 2023-01-01 RX ORDER — LIDOCAINE 40 MG/G
CREAM TOPICAL
Status: CANCELLED | OUTPATIENT
Start: 2023-01-01

## 2023-01-01 RX ORDER — NALOXONE HYDROCHLORIDE 0.4 MG/ML
0.2 INJECTION, SOLUTION INTRAMUSCULAR; INTRAVENOUS; SUBCUTANEOUS
Status: DISCONTINUED | OUTPATIENT
Start: 2023-01-01 | End: 2023-01-01 | Stop reason: HOSPADM

## 2023-01-01 RX ORDER — TAMSULOSIN HYDROCHLORIDE 0.4 MG/1
0.4 CAPSULE ORAL EVERY EVENING
DISCHARGE
Start: 2023-01-01 | End: 2024-01-01

## 2023-01-01 RX ORDER — FENTANYL CITRATE 50 UG/ML
25 INJECTION, SOLUTION INTRAMUSCULAR; INTRAVENOUS EVERY 5 MIN PRN
Status: DISCONTINUED | OUTPATIENT
Start: 2023-01-01 | End: 2023-01-01 | Stop reason: HOSPADM

## 2023-01-01 RX ORDER — ONDANSETRON 2 MG/ML
4 INJECTION INTRAMUSCULAR; INTRAVENOUS EVERY 6 HOURS PRN
Status: DISCONTINUED | OUTPATIENT
Start: 2023-01-01 | End: 2023-01-01 | Stop reason: HOSPADM

## 2023-01-01 RX ORDER — SODIUM CHLORIDE, SODIUM LACTATE, POTASSIUM CHLORIDE, CALCIUM CHLORIDE 600; 310; 30; 20 MG/100ML; MG/100ML; MG/100ML; MG/100ML
INJECTION, SOLUTION INTRAVENOUS CONTINUOUS
Status: DISCONTINUED | OUTPATIENT
Start: 2023-01-01 | End: 2023-01-01

## 2023-01-01 RX ORDER — RESPIRATORY SYNCYTIAL VIRUS VACCINE 120MCG/0.5
0.5 KIT INTRAMUSCULAR ONCE
Qty: 1 EACH | Refills: 0 | Status: CANCELLED | OUTPATIENT
Start: 2023-01-01 | End: 2023-01-01

## 2023-01-01 RX ORDER — FENTANYL CITRATE 50 UG/ML
50 INJECTION, SOLUTION INTRAMUSCULAR; INTRAVENOUS EVERY 5 MIN PRN
Status: DISCONTINUED | OUTPATIENT
Start: 2023-01-01 | End: 2023-01-01 | Stop reason: HOSPADM

## 2023-01-01 RX ORDER — POLYETHYLENE GLYCOL 3350 17 G/17G
17 POWDER, FOR SOLUTION ORAL DAILY
Status: DISCONTINUED | OUTPATIENT
Start: 2023-01-01 | End: 2023-01-01

## 2023-01-01 RX ORDER — HYDROMORPHONE HCL IN WATER/PF 6 MG/30 ML
0.2 PATIENT CONTROLLED ANALGESIA SYRINGE INTRAVENOUS EVERY 5 MIN PRN
Status: DISCONTINUED | OUTPATIENT
Start: 2023-01-01 | End: 2023-01-01 | Stop reason: HOSPADM

## 2023-01-01 RX ORDER — HYDRALAZINE HYDROCHLORIDE 20 MG/ML
2.5-5 INJECTION INTRAMUSCULAR; INTRAVENOUS EVERY 10 MIN PRN
Status: DISCONTINUED | OUTPATIENT
Start: 2023-01-01 | End: 2023-01-01 | Stop reason: HOSPADM

## 2023-01-01 RX ORDER — MULTIPLE VITAMINS W/ MINERALS TAB 9MG-400MCG
1 TAB ORAL DAILY
DISCHARGE
Start: 2023-01-01

## 2023-01-01 RX ORDER — PROPOFOL 10 MG/ML
INJECTION, EMULSION INTRAVENOUS PRN
Status: DISCONTINUED | OUTPATIENT
Start: 2023-01-01 | End: 2023-01-01

## 2023-01-01 RX ORDER — PHYTONADIONE 5 MG/1
10 TABLET ORAL ONCE
Qty: 2 TABLET | Refills: 0 | Status: ON HOLD | OUTPATIENT
Start: 2023-01-01 | End: 2023-01-01

## 2023-01-01 RX ORDER — CEFTRIAXONE 1 G/1
1 INJECTION, POWDER, FOR SOLUTION INTRAMUSCULAR; INTRAVENOUS ONCE
Status: COMPLETED | OUTPATIENT
Start: 2023-01-01 | End: 2023-01-01

## 2023-01-01 RX ORDER — ASPIRIN 81 MG/1
81 TABLET ORAL DAILY
Status: DISCONTINUED | OUTPATIENT
Start: 2023-01-01 | End: 2023-01-01 | Stop reason: HOSPADM

## 2023-01-01 RX ADMIN — TAMSULOSIN HYDROCHLORIDE 0.4 MG: 0.4 CAPSULE ORAL at 21:16

## 2023-01-01 RX ADMIN — CEFTRIAXONE SODIUM 1 G: 1 INJECTION, POWDER, FOR SOLUTION INTRAMUSCULAR; INTRAVENOUS at 18:19

## 2023-01-01 RX ADMIN — EPHEDRINE SULFATE 5 MG: 5 INJECTION INTRAVENOUS at 15:03

## 2023-01-01 RX ADMIN — POLYETHYLENE GLYCOL 3350 17 G: 17 POWDER, FOR SOLUTION ORAL at 07:54

## 2023-01-01 RX ADMIN — CHOLECALCIFEROL (VITAMIN D3) 10 MCG (400 UNIT) TABLET 10 MCG: at 09:14

## 2023-01-01 RX ADMIN — POLYETHYLENE GLYCOL 3350 17 G: 17 POWDER, FOR SOLUTION ORAL at 07:52

## 2023-01-01 RX ADMIN — EPHEDRINE SULFATE 10 MG: 5 INJECTION INTRAVENOUS at 15:05

## 2023-01-01 RX ADMIN — POLYETHYLENE GLYCOL 3350 17 G: 17 POWDER, FOR SOLUTION ORAL at 08:02

## 2023-01-01 RX ADMIN — TRIAMCINOLONE ACETONIDE: 1 OINTMENT TOPICAL at 20:43

## 2023-01-01 RX ADMIN — SODIUM CHLORIDE, POTASSIUM CHLORIDE, SODIUM LACTATE AND CALCIUM CHLORIDE 500 ML: 600; 310; 30; 20 INJECTION, SOLUTION INTRAVENOUS at 22:42

## 2023-01-01 RX ADMIN — CEFTRIAXONE SODIUM 1 G: 1 INJECTION, POWDER, FOR SOLUTION INTRAMUSCULAR; INTRAVENOUS at 19:47

## 2023-01-01 RX ADMIN — SODIUM CHLORIDE, POTASSIUM CHLORIDE, SODIUM LACTATE AND CALCIUM CHLORIDE: 600; 310; 30; 20 INJECTION, SOLUTION INTRAVENOUS at 14:32

## 2023-01-01 RX ADMIN — PHENYLEPHRINE HYDROCHLORIDE 100 MCG: 10 INJECTION INTRAVENOUS at 15:01

## 2023-01-01 RX ADMIN — HEPARIN SODIUM 5000 UNITS: 5000 INJECTION, SOLUTION INTRAVENOUS; SUBCUTANEOUS at 06:09

## 2023-01-01 RX ADMIN — FENTANYL CITRATE 25 MCG: 50 INJECTION INTRAMUSCULAR; INTRAVENOUS at 14:52

## 2023-01-01 RX ADMIN — TRIAMCINOLONE ACETONIDE: 1 OINTMENT TOPICAL at 21:18

## 2023-01-01 RX ADMIN — SUGAMMADEX 200 MG: 100 INJECTION, SOLUTION INTRAVENOUS at 16:05

## 2023-01-01 RX ADMIN — SODIUM CHLORIDE, POTASSIUM CHLORIDE, SODIUM LACTATE AND CALCIUM CHLORIDE: 600; 310; 30; 20 INJECTION, SOLUTION INTRAVENOUS at 10:39

## 2023-01-01 RX ADMIN — POLYETHYLENE GLYCOL 3350 17 G: 17 POWDER, FOR SOLUTION ORAL at 09:15

## 2023-01-01 RX ADMIN — TRIAMCINOLONE ACETONIDE: 1 OINTMENT TOPICAL at 09:58

## 2023-01-01 RX ADMIN — TRIAMCINOLONE ACETONIDE: 1 OINTMENT TOPICAL at 10:33

## 2023-01-01 RX ADMIN — CHOLECALCIFEROL (VITAMIN D3) 10 MCG (400 UNIT) TABLET 10 MCG: at 08:09

## 2023-01-01 RX ADMIN — IOPAMIDOL 90 ML: 755 INJECTION, SOLUTION INTRAVENOUS at 16:30

## 2023-01-01 RX ADMIN — CHOLECALCIFEROL (VITAMIN D3) 10 MCG (400 UNIT) TABLET 10 MCG: at 09:22

## 2023-01-01 RX ADMIN — LEVOFLOXACIN 500 MG: 5 INJECTION, SOLUTION INTRAVENOUS at 14:56

## 2023-01-01 RX ADMIN — TRIAMCINOLONE ACETONIDE: 1 OINTMENT TOPICAL at 11:40

## 2023-01-01 RX ADMIN — EPHEDRINE SULFATE 5 MG: 5 INJECTION INTRAVENOUS at 15:25

## 2023-01-01 RX ADMIN — POTASSIUM PHOSPHATE, MONOBASIC POTASSIUM PHOSPHATE, DIBASIC 9 MMOL: 224; 236 INJECTION, SOLUTION, CONCENTRATE INTRAVENOUS at 00:42

## 2023-01-01 RX ADMIN — VANCOMYCIN HYDROCHLORIDE 1250 MG: 10 INJECTION, POWDER, LYOPHILIZED, FOR SOLUTION INTRAVENOUS at 19:15

## 2023-01-01 RX ADMIN — POTASSIUM & SODIUM PHOSPHATES POWDER PACK 280-160-250 MG 1 PACKET: 280-160-250 PACK at 11:27

## 2023-01-01 RX ADMIN — CHOLECALCIFEROL (VITAMIN D3) 10 MCG (400 UNIT) TABLET 10 MCG: at 07:52

## 2023-01-01 RX ADMIN — SODIUM CHLORIDE, POTASSIUM CHLORIDE, SODIUM LACTATE AND CALCIUM CHLORIDE: 600; 310; 30; 20 INJECTION, SOLUTION INTRAVENOUS at 23:08

## 2023-01-01 RX ADMIN — TRIAMCINOLONE ACETONIDE: 1 OINTMENT TOPICAL at 10:05

## 2023-01-01 RX ADMIN — CHOLECALCIFEROL (VITAMIN D3) 10 MCG (400 UNIT) TABLET 10 MCG: at 07:54

## 2023-01-01 RX ADMIN — TAMSULOSIN HYDROCHLORIDE 0.4 MG: 0.4 CAPSULE ORAL at 09:14

## 2023-01-01 RX ADMIN — FENTANYL CITRATE 50 MCG: 50 INJECTION INTRAMUSCULAR; INTRAVENOUS at 15:56

## 2023-01-01 RX ADMIN — PHYTONADIONE 10 MG: 10 INJECTION, EMULSION INTRAMUSCULAR; INTRAVENOUS; SUBCUTANEOUS at 09:26

## 2023-01-01 RX ADMIN — LIDOCAINE HYDROCHLORIDE 50 MG: 20 INJECTION, SOLUTION INFILTRATION; PERINEURAL at 14:53

## 2023-01-01 RX ADMIN — TRIAMCINOLONE ACETONIDE: 1 OINTMENT TOPICAL at 07:52

## 2023-01-01 RX ADMIN — POTASSIUM & SODIUM PHOSPHATES POWDER PACK 280-160-250 MG 1 PACKET: 280-160-250 PACK at 14:26

## 2023-01-01 RX ADMIN — ONDANSETRON 4 MG: 2 INJECTION INTRAMUSCULAR; INTRAVENOUS at 15:57

## 2023-01-01 RX ADMIN — HUMAN ALBUMIN MICROSPHERES AND PERFLUTREN 6 ML: 10; .22 INJECTION, SOLUTION INTRAVENOUS at 13:47

## 2023-01-01 RX ADMIN — SODIUM CHLORIDE, POTASSIUM CHLORIDE, SODIUM LACTATE AND CALCIUM CHLORIDE 500 ML: 600; 310; 30; 20 INJECTION, SOLUTION INTRAVENOUS at 09:21

## 2023-01-01 RX ADMIN — SODIUM CHLORIDE, POTASSIUM CHLORIDE, SODIUM LACTATE AND CALCIUM CHLORIDE: 600; 310; 30; 20 INJECTION, SOLUTION INTRAVENOUS at 17:06

## 2023-01-01 RX ADMIN — CHOLECALCIFEROL (VITAMIN D3) 10 MCG (400 UNIT) TABLET 10 MCG: at 08:58

## 2023-01-01 RX ADMIN — TAMSULOSIN HYDROCHLORIDE 0.4 MG: 0.4 CAPSULE ORAL at 08:09

## 2023-01-01 RX ADMIN — LIDOCAINE HYDROCHLORIDE 50 MG: 20 INJECTION, SOLUTION INFILTRATION; PERINEURAL at 14:52

## 2023-01-01 RX ADMIN — Medication 50 MG: at 14:53

## 2023-01-01 RX ADMIN — POTASSIUM PHOSPHATE, MONOBASIC POTASSIUM PHOSPHATE, DIBASIC 9 MMOL: 224; 236 INJECTION, SOLUTION, CONCENTRATE INTRAVENOUS at 09:13

## 2023-01-01 RX ADMIN — CHOLECALCIFEROL (VITAMIN D3) 10 MCG (400 UNIT) TABLET 10 MCG: at 08:02

## 2023-01-01 RX ADMIN — POLYETHYLENE GLYCOL 3350 17 G: 17 POWDER, FOR SOLUTION ORAL at 10:05

## 2023-01-01 RX ADMIN — POLYETHYLENE GLYCOL 3350 17 G: 17 POWDER, FOR SOLUTION ORAL at 21:17

## 2023-01-01 RX ADMIN — TAMSULOSIN HYDROCHLORIDE 0.4 MG: 0.4 CAPSULE ORAL at 08:02

## 2023-01-01 RX ADMIN — VANCOMYCIN HYDROCHLORIDE 1250 MG: 10 INJECTION, POWDER, LYOPHILIZED, FOR SOLUTION INTRAVENOUS at 11:32

## 2023-01-01 RX ADMIN — TAMSULOSIN HYDROCHLORIDE 0.4 MG: 0.4 CAPSULE ORAL at 10:18

## 2023-01-01 RX ADMIN — PROPOFOL 100 MG: 10 INJECTION, EMULSION INTRAVENOUS at 14:53

## 2023-01-01 ASSESSMENT — ACTIVITIES OF DAILY LIVING (ADL)
ADLS_ACUITY_SCORE: 40
ADLS_ACUITY_SCORE: 31
ADLS_ACUITY_SCORE: 31
ADLS_ACUITY_SCORE: 41
ADLS_ACUITY_SCORE: 37
ADLS_ACUITY_SCORE: 38
ADLS_ACUITY_SCORE: 31
ADLS_ACUITY_SCORE: 40
ADLS_ACUITY_SCORE: 31
ADLS_ACUITY_SCORE: 41
ADLS_ACUITY_SCORE: 38
ADLS_ACUITY_SCORE: 31
ADLS_ACUITY_SCORE: 39
ADLS_ACUITY_SCORE: 42
ADLS_ACUITY_SCORE: 40
ADLS_ACUITY_SCORE: 31
ADLS_ACUITY_SCORE: 37
ADLS_ACUITY_SCORE: 38
ADLS_ACUITY_SCORE: 39
ADLS_ACUITY_SCORE: 39
ADLS_ACUITY_SCORE: 35
ADLS_ACUITY_SCORE: 40
ADLS_ACUITY_SCORE: 31
ADLS_ACUITY_SCORE: 40
ADLS_ACUITY_SCORE: 42
ADLS_ACUITY_SCORE: 38
ADLS_ACUITY_SCORE: 38
DEPENDENT_IADLS:: INDEPENDENT
ADLS_ACUITY_SCORE: 38
ADLS_ACUITY_SCORE: 42
ADLS_ACUITY_SCORE: 40
ADLS_ACUITY_SCORE: 42
ADLS_ACUITY_SCORE: 38
ADLS_ACUITY_SCORE: 41
ADLS_ACUITY_SCORE: 42
ADLS_ACUITY_SCORE: 38
ADLS_ACUITY_SCORE: 42
ADLS_ACUITY_SCORE: 38
ADLS_ACUITY_SCORE: 38
ADLS_ACUITY_SCORE: 39
ADLS_ACUITY_SCORE: 38
ADLS_ACUITY_SCORE: 42
ADLS_ACUITY_SCORE: 31
ADLS_ACUITY_SCORE: 39
ADLS_ACUITY_SCORE: 37
ADLS_ACUITY_SCORE: 37
ADLS_ACUITY_SCORE: 35
ADLS_ACUITY_SCORE: 35
ADLS_ACUITY_SCORE: 38
ADLS_ACUITY_SCORE: 35
ADLS_ACUITY_SCORE: 38
ADLS_ACUITY_SCORE: 42
ADLS_ACUITY_SCORE: 38
ADLS_ACUITY_SCORE: 42
ADLS_ACUITY_SCORE: 37
ADLS_ACUITY_SCORE: 42
ADLS_ACUITY_SCORE: 40
ADLS_ACUITY_SCORE: 31
ADLS_ACUITY_SCORE: 37
ADLS_ACUITY_SCORE: 40
ADLS_ACUITY_SCORE: 38
ADLS_ACUITY_SCORE: 37
ADLS_ACUITY_SCORE: 42
ADLS_ACUITY_SCORE: 42
ADLS_ACUITY_SCORE: 35
ADLS_ACUITY_SCORE: 42
ADLS_ACUITY_SCORE: 38
ADLS_ACUITY_SCORE: 39
ADLS_ACUITY_SCORE: 31
ADLS_ACUITY_SCORE: 38
ADLS_ACUITY_SCORE: 40
ADLS_ACUITY_SCORE: 35
ADLS_ACUITY_SCORE: 31
ADLS_ACUITY_SCORE: 38
ADLS_ACUITY_SCORE: 40
ADLS_ACUITY_SCORE: 29
ADLS_ACUITY_SCORE: 38
ADLS_ACUITY_SCORE: 42
ADLS_ACUITY_SCORE: 31
PREVIOUS_RESPONSIBILITIES: MEAL PREP;HOUSEKEEPING;LAUNDRY;SHOPPING;YARDWORK;MEDICATION MANAGEMENT;FINANCES;DRIVING
ADLS_ACUITY_SCORE: 41
DEPENDENT_IADLS:: INDEPENDENT
ADLS_ACUITY_SCORE: 40

## 2023-01-01 ASSESSMENT — ENCOUNTER SYMPTOMS
CHEST TIGHTNESS: 0
NERVOUS/ANXIOUS: 0
CONSTIPATION: 0
DIARRHEA: 0
SHORTNESS OF BREATH: 0
DYSURIA: 0
HEADACHES: 0
HEADACHES: 0
NERVOUS/ANXIOUS: 0
LIGHT-HEADEDNESS: 0
COLOR CHANGE: 1
CONSTIPATION: 0
ABDOMINAL PAIN: 1
BACK PAIN: 0
NUMBNESS: 0
SINUS PAIN: 0
FREQUENCY: 0
LIGHT-HEADEDNESS: 0
CONFUSION: 0

## 2023-11-15 NOTE — TELEPHONE ENCOUNTER
"Spoke with patient's wife.  Patient also present.  Wife was extremely hard of hearing and did not hear the triage questions correctly.  Questions were asked multiple times and also rephrased but she could not answer all of them correctly as she was mishearing the questions.  Overall, was not able to do a thorough triage over the phone   RN had to repeat instructions and appointment information several times.     Per wife, patient \"thinks his face is looking yellow\"  She does not think the whites of the eyes are yellow.  Denies any other symptoms     Appointment scheduled for tomorrow with Jamila Bob NP   Had wife write down appointment date/time and repeat it back til she got all the information needed.    Reason for Disposition   Jaundice    Additional Information   Negative: Unconscious or difficult to awaken   Negative: Acting confused (e.g., disoriented, slurred speech)   Negative: Seizure has occurred   Negative: Has fainted (passed out)   Negative: Very weak (e.g., can't stand)   Negative: Acetaminophen overdose or poisoning suspected   Negative: Sounds like a life-threatening emergency to the triager   Negative: [1] Abdominal pain AND [2] severe   Negative: [1] Constant abdominal pain AND [2] present > 2 hours   Negative: [1] Vomiting AND [2] contains red blood or black (\"coffee ground\") material   Negative: [1] Vomiting AND [2] signs of dehydration (e.g., very dry mouth, lightheaded)   Negative: Fever   Negative: Shaking chills   Negative: [1] Drinking very little AND [2] dehydration suspected (e.g., no urine > 12 hours, very dry mouth, very lightheaded)   Negative: Patient sounds very sick or weak to the triager   Negative: Abdominal pain    Protocols used: [id number]    "

## 2023-11-15 NOTE — TELEPHONE ENCOUNTER
General Call    Contacts         Type Contact Phone/Fax    11/15/2023 02:18 PM CST Phone (Incoming) BHAVESH HOFF (Emergency Contact) [phone]          Reason for Call:  Lab Request    What are your questions or concerns:  Pt's wife called stating they would like for orders to be put in to have lab work done prior to the Pt's upcoming Appt on 11/29. He says that his skin is yellow and he has concerns regarding this. Please call as soon as possible to discuss. Thank you!    Date of last appointment with provider: Upcoming 11/29    Okay to leave a detailed message?: Yes [phone]

## 2023-11-16 NOTE — PROGRESS NOTES
(R17) Jaundice  (primary encounter diagnosis)  Comment: Patient came into clinic today to be seen with concerns for jaundice. Patient is notably jaundiced including the sclera of both eyes. Patient denies pain, has tenderness on palpation but no rebound pain noted, denies constipation or diarrhea, does state that stool is tan in color. Patient stated he started noticing yellowness to skin 5 weeks ago but it has continued to get worse. Discussed labs and getting CT to evaluate cause, may need to do ultrasound versus CT depending on results of labs.  Plan: Comprehensive metabolic panel (BMP + Alb, Alk         Phos, ALT, AST, Total. Bili, TP), CBC with         platelets and differential, Lipid panel reflex         to direct LDL Fasting, CT Abdomen Pelvis w/o &         w Contrast        Pending    (Z23) Need for shingles vaccine  Comment: Due to present illness will readdress need for vaccine.  Plan: Re address in the future    (Z23) Need for Tdap vaccination  Comment: Due to present illness will readdress need for vaccine.  Plan: Re address in the future    (Z29.11) Need for vaccination against respiratory syncytial virus  Comment: Due to present illness will readdress need for vaccine.  Plan: Readdress in the future.         Jose Beal is a 89 year old, presenting for the following health issues:  Derm Problem (Yellowing of skin )        11/16/2023     9:57 AM   Additional Questions   Roomed by Shant RODRIGUES       Skin discoloration x 5 weeks- Jaundice, States his bowel movements are a tan color         Review of Systems   HENT:  Negative for congestion.    Cardiovascular:  Negative for chest pain.   Gastrointestinal:  Positive for abdominal pain. Negative for constipation and diarrhea.   Genitourinary:  Negative for dysuria and frequency.   Neurological:  Negative for light-headedness and headaches.   Psychiatric/Behavioral:  Negative for mood changes. The patient is not nervous/anxious.            "  Objective    BP (!) 168/81 (BP Location: Left arm, Patient Position: Sitting, Cuff Size: Adult Regular)   Pulse 56   Temp 96.8  F (36  C) (Temporal)   Resp 14   Ht 1.702 m (5' 7\")   Wt 56.2 kg (124 lb)   SpO2 100%   BMI 19.42 kg/m    Body mass index is 19.42 kg/m .  Physical Exam  Constitutional:       Appearance: Normal appearance.   HENT:      Head: Normocephalic.      Mouth/Throat:      Mouth: Mucous membranes are moist.   Eyes:      Comments: Scleral are yellow   Pulmonary:      Effort: Pulmonary effort is normal.      Breath sounds: Normal breath sounds.   Abdominal:      General: Bowel sounds are normal. There is no distension.      Tenderness: There is abdominal tenderness. There is no guarding.   Skin:     Coloration: Skin is jaundiced.      Findings: Bruising and rash present.   Neurological:      Mental Status: He is alert.            Results for orders placed or performed in visit on 11/16/23   CBC with platelets and differential     Status: Abnormal   Result Value Ref Range    WBC Count 4.7 4.0 - 11.0 10e3/uL    RBC Count 3.68 (L) 4.40 - 5.90 10e6/uL    Hemoglobin 11.3 (L) 13.3 - 17.7 g/dL    Hematocrit 31.9 (L) 40.0 - 53.0 %    MCV 87 78 - 100 fL    MCH 30.7 26.5 - 33.0 pg    MCHC 35.4 31.5 - 36.5 g/dL    RDW 18.8 (H) 10.0 - 15.0 %    Platelet Count 148 (L) 150 - 450 10e3/uL    % Neutrophils 72 %    % Lymphocytes 12 %    % Monocytes 13 %    % Eosinophils 1 %    % Basophils 1 %    % Immature Granulocytes 1 %    NRBCs per 100 WBC 0 <1 /100    Absolute Neutrophils 3.5 1.6 - 8.3 10e3/uL    Absolute Lymphocytes 0.6 (L) 0.8 - 5.3 10e3/uL    Absolute Monocytes 0.6 0.0 - 1.3 10e3/uL    Absolute Eosinophils 0.0 0.0 - 0.7 10e3/uL    Absolute Basophils 0.0 0.0 - 0.2 10e3/uL    Absolute Immature Granulocytes 0.0 <=0.4 10e3/uL    Absolute NRBCs 0.0 10e3/uL   CBC with platelets and differential     Status: Abnormal    Narrative    The following orders were created for panel order CBC with platelets and " differential.  Procedure                               Abnormality         Status                     ---------                               -----------         ------                     CBC with platelets and d...[[id number]]  Abnormal            Final result                 Please view results for these tests on the individual orders.

## 2023-11-17 NOTE — TELEPHONE ENCOUNTER
Received call from lab for critical results;   Total Bilirubin 18.1  Call patient, wife took phone, she is hard of hearing relayed to her that her results shows build up of bilirubin in blood and that is why skin skin.  Since stable; will await CT scan ordered; says have scheduled on in [place].

## 2023-11-17 NOTE — TELEPHONE ENCOUNTER
"Call was placed to Patient this am to update that is ok to continue with CT scan to further evaluate why his LFTs are elevated. Patient denies any nausea, vomiting or pain. Sates \"I feel fine.\" Did discuss that if starts having symptoms he should be seen in the ED.   "

## 2023-11-21 NOTE — PROGRESS NOTES
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 Patient denies pain. Patient states \"I don't have any pain at all and I am still going to the bathroom.\" Patient was noted to be constipated as well on CT. Discussed the use of bowel medications that they can use to help resolve constipation but patient denies feeling constipated. Patient is very hard of hearing and relies on his wife to relay all information via phone calls. Discussed referral to general surgery to assess plan of care and also discussed care coordinator referral. Also gave direction to go to Emergency room if symptoms worsen in any way. Patient acknowledged they understood instruction.  Plan: Adult General Surg Referral, Primary Care -         Care Coordination Referral        Pending  Patient will go to Emergency room if symptoms worsen.    Jose Beal is a 89 year old, presenting for the following health issues:  Results (CT Scan)        11/21/2023     2:52 PM   Additional Questions   Roomed by Bianca THOMSON       Patient came to clinic on the 11/16/23 and was noted to be quite jaundice. Bilirubin ws noted to be 18.1. CT ordered and patient was found to have  a very large cystic mass along the course of the common bile duct measuring 4.6 x 4.5 cm. Marked dilatation involving the central intrahepatic bile ducts as well as dilatation involving the pancreatic duct measuring up to 5 mm.The gallbladder was found to be considerably distended as well.         Follow up on CT Scan      Review of Systems   HENT:  Negative for congestion, sinus pain and tinnitus.    Respiratory:  Negative for chest tightness and shortness of breath.    Gastrointestinal:  Negative for constipation.   Musculoskeletal:  Negative for back pain.   Skin:  Positive for color change and pallor.   Neurological:  Negative for light-headedness, numbness and headaches.   Psychiatric/Behavioral:  " Negative for confusion. The patient is not nervous/anxious.             Objective    BP (!) 156/58 (BP Location: Right arm, Patient Position: Sitting, Cuff Size: Adult Small)   Pulse 71   Temp 98  F (36.7  C) (Temporal)   Wt 57.6 kg (127 lb)   SpO2 99%   BMI 19.89 kg/m    Body mass index is 19.89 kg/m .  Physical Exam  Constitutional:       Appearance: Normal appearance.   HENT:      Head: Normocephalic.      Nose: Nose normal.      Mouth/Throat:      Mouth: Mucous membranes are moist.   Eyes:      Pupils: Pupils are equal, round, and reactive to light.   Cardiovascular:      Rate and Rhythm: Normal rate and regular rhythm.      Pulses: Normal pulses.      Heart sounds: Normal heart sounds. No murmur heard.  Pulmonary:      Effort: Pulmonary effort is normal. No respiratory distress.      Breath sounds: No wheezing.   Abdominal:      General: Bowel sounds are normal. There is no distension.      Tenderness: There is abdominal tenderness. There is no guarding.   Musculoskeletal:         General: No swelling, tenderness or deformity. Normal range of motion.   Skin:     General: Skin is warm.      Coloration: Skin is not jaundiced.      Findings: No bruising.   Neurological:      General: No focal deficit present.      Mental Status: He is alert and oriented to person, place, and time.   Psychiatric:         Mood and Affect: Mood normal.         Behavior: Behavior normal.            No results found for any visits on 11/21/23.

## 2023-11-22 NOTE — PROGRESS NOTES
Patient scheduled an appointment with Dr. Gutierrez which was incorrectly scheduled with the wrong team. IB message sent to Surgical Oncology who recommend that the patient see [facility] for further work-up.  IB message sent to team to review chart and contact the patient.

## 2023-11-22 NOTE — TELEPHONE ENCOUNTER
REFERRAL INFORMATION:  Referring Provider: Jamila Bob NP  Referring Clinic: [facility] - Primary Care  Reason for Visit/Diagnosis: Bile Duct Mass       FUTURE VISIT INFORMATION:  Appointment Date: 11/29/2023  Appointment Time: 11:30 AM     NOTES RECORD STATUS  DETAILS   OFFICE NOTE from Referring Provider Internal [facility]:  11/21/23, 11/16/23 - [facility] OV with Jamila Bob NP   OFFICE NOTE from Other Specialists N/A    HOSPITAL DISCHARGE SUMMARY/ ED VISITS  N/A    OPERATIVE REPORT N/A    PERTINENT LABS Internal    IMAGING (CT, MRI, US, XR)  Internal [facility]:  11/17/23 - CT Abd/Pelvis

## 2023-11-22 NOTE — TELEPHONE ENCOUNTER
Referral for general surgery consult-Common bile duct mass    Urgency 3-5 days.  Patient spouse said unable to travel to [place] or any other location for a sooner appt. Requesting [place] only.  I did schedule for next opening of 12/15. Can you please triage and get worked in sooner if needed.    Thank you

## 2023-11-22 NOTE — TELEPHONE ENCOUNTER
Called to talk to patient's wife regarding referral (patient is [place])  Explained we are working on an outpatient plan for next week    Per wife, patient is feeling fine. No symptoms or concerns at this time.   Asked that they bring him to [facility] ER with fever, pain or confusion. Address provided.    Wife also rather [place] and confused, repeated items many time. States their son Andrés lives with them. Explained he cannot drive after procedure.     Explained that we'll call with more information on Friday.    Zulema Simms RN Care Coordinator

## 2023-11-22 NOTE — PROGRESS NOTES
Community Health Worker Initial Outreach     Patient and spouse declined CCC stating they don't need any additional support at home at this time.      Patient accepts CC: No, declined. Patient will be sent Care Coordination introduction letter for future reference.     BELTRAN Rose, Chrissy Stock, [employer] [place] and [facility]  [phone]

## 2023-11-22 NOTE — TELEPHONE ENCOUNTER
There are no openings in [place], routing to provider to advise.    ZINA Petit RN 11/22/2023 3:58 PM

## 2023-11-24 NOTE — TELEPHONE ENCOUNTER
Called to discuss with patient. Son, Keshawn, picked up and stated that Emigdio was not currently available. Did ask that Emigdio call to add Keshawn to the contact list. Did inquire about the timing of Tues , Keshawn states it will work and confirmed that  appt with surgery is cancelled.     Procedure/Imaging/Clinic: ERCP and possible EUS  Physician: Albert/Diego  Timin23  Scope time needed: per MD average  Anesthesia:General  Dx: jaundice, elevated bilirubin  Tier:Tier 2 - Not life/limb threatening but potential for  patient morbidity/mortality or adverse  patient/disease outcome if  surgery/procedure not done within 30 day  Location: [facility]  Header of letter for pt communication: Upper endoscopy to assess biliary duct       Explained  OR will call 1-2 days prior to procedure date with arrival time, will need a , someone to stay with them for 24 hours and should stay in town for 24 hours (within 45 min of Hospital) post procedure    Patient needs to get pre-op physical completed. If outside [facility] system will need physical faxed to number [phone]     If you do not get a preop physical, your procedure could be cancelled, patient voiced understanding*    Preop Plan: 23 office visit with Jamila Bob.     Does patient have any history of gastric bypass/gastric surgery/altered panc/bili anatomy? none    Any recent Covid symptoms or positive covid test? Discussed to test if symptomatic    Does patient have [employer] insurance?: BCBS    Med Review    Blood thinner - none   ASA - none  Diabetic - none  Any meds by injection or mouth for weight loss or diabetes- none    Patient Education r/t procedure: letter via mail, did ask if this could be sent via email. [email or web address]    A pre-op nurse will call 1-2 days prior to the procedure. Did share an approximate timing of arrival on  with the son. Will send PAN a message to ensure that patient gets a pre op call.     NPO/Prep:    Discussed 8 hours prior NPO solids, and 1 hour prior to arrival NPO clear liquids.     Asked that Emigdio call back once available today to discuss details and add Keshawn to the chart.      Procedure order placed, message routed to OR .    1145  Spoke with patient and his wife , adding Keshawn to his contact list. Will send procedure letter to Keshawn's email account above.    Questions answered. Pt's wife asked questions repeatedly and answered the same question about timing and address of procedure multiple times. Emigdio was in the background and verbalized understanding of the plan on 11/28, arrival of 11:30am shared. Discussed NPO guidelines around that arrival time. Did relay that they may still receive a PAN call with these details.     Did discuss when to present to the ED and patient's wife said he is feeling good, no symptoms.    Jamila Aguilar, RN, BSN,   [facility]  Care coordinator

## 2023-11-24 NOTE — TELEPHONE ENCOUNTER
Patient already scheduled for ERCP 11/27, and that team will proceed with further follow up after procedure completed.

## 2023-11-27 PROBLEM — R53.1 GENERALIZED WEAKNESS: Status: ACTIVE | Noted: 2023-01-01

## 2023-11-27 NOTE — TELEPHONE ENCOUNTER
"Wife called about procedure at 11:30 tomorrow for ERCP    Reviewed plan for vitamin K today, reviewed that I ordered these meds and talked to andrés about this plan this morning. Wife states \"Andrés didn't pick them up\" She doesn't know where he is. She doesn't know how she learned about the medication being prescribed. Wife doesn't know how to reach Andrés to see where he is/if he's picking up meds.    Wife confused. I suggested that perhaps Andrés is out getting the medication now, and that it needs to be picked up today and taken asap.   Reviewed plan for ERCP tomorrow, reviewed arrival time    ML  "

## 2023-11-27 NOTE — TELEPHONE ENCOUNTER
Per Dr. Price    Any chance to get him in for 10mg subQ vit K on Monday.   W bili so high abnormal INR likely on Tuesday   Otherwise start 10mg oral vit K MON AM.     Called and talked to son Keshawn, reviewed reommendation for vitamin K today- confirmed pharmacy. Asked that they get the medication to give to patient asap this morning. Son voiced understanding, meds ordered and signed.    ML

## 2023-11-27 NOTE — ED TRIAGE NOTES
Triage Assessment (Adult)       Row Name 11/27/23 1522          Triage Assessment    Airway WDL WDL        Respiratory WDL    Respiratory WDL WDL        Skin Circulation/Temperature WDL    Skin Circulation/Temperature WDL WDL        Cardiac WDL    Cardiac WDL WDL        Cognitive/Neuro/Behavioral WDL    Cognitive/Neuro/Behavioral WDL WDL

## 2023-11-27 NOTE — PHARMACY-VANCOMYCIN DOSING SERVICE
Pharmacy Vancomycin Initial Note  Date of Service 2023  Patient's  3/17/1926  89 year old, male    Indication: Skin and Soft Tissue Infection    Current estimated CrCl = Estimated Creatinine Clearance: 12.2 mL/min (A) (based on SCr of 2.83 mg/dL (H)).    Creatinine for last 3 days  2023:  3:33 PM Creatinine 2.83 mg/dL    Recent Vancomycin Level(s) for last 3 days  No results found for requested labs within last 3 days.      Vancomycin IV Administrations (past 72 hours)        No vancomycin orders with administrations in past 72 hours.                    Nephrotoxins and other renal medications (From now, onward)      Start     Dose/Rate Route Frequency Ordered Stop    23 1800  vancomycin (VANCOCIN) 1,250 mg in 0.9% NaCl 250 mL intermittent infusion         1,250 mg  over 90 Minutes Intravenous ONCE 23 1746      23 1746  vancomycin place ponce - receiving intermittent dosing         1 each Intravenous SEE ADMIN INSTRUCTIONS 23 174              Contrast Orders - past 72 hours (72h ago, onward)      None                  Plan:  Give vancomycin 1250mg IV x1 now. Will intermittently dose based on levels for now due to ERIC on admit   Vancomycin monitoring method: Trough (Method 2 = manual dose calculation)  Vancomycin therapeutic monitoring goal: 10-15 mg/L  Pharmacy will check vancomycin levels as appropriate in 1-3 Days.    Serum creatinine levels will be ordered daily for the first week of therapy and at least twice weekly for subsequent weeks.      Juan Harper, PharmD, BCPS

## 2023-11-27 NOTE — ED PROVIDER NOTES
[facility] EMERGENCY DEPARTMENT ([facility])    11/27/23       ED PROVIDER NOTE  ED 23      History     Chief Complaint   Patient presents with    Generalized Weakness     BIBA from private residence for generalized weakness and bilateral LE pitting edema for 6 weeks.  Jaundice and abd distention with LLQ pain upon palpation only.       HPI  Emigdio Hannah is a 89 year old male with PMH of recent jaundice (11/16/2023) who presents to the Emergency Department today due to generalized weakness. Patient states he has been feeling weak at home and it has been more difficult for him to get around due to bilateral leg swelling. He states this swelling has been going on since his PCP visit 2 weeks ago where he was seen for concerns of jaundice. He denies swelling in his abdomen however, endorses pain in his mid abdomen. Patient states he feels pain in his legs when he is walking and with palpation.  Pain and swelling are more notable in the right leg.  No other symptoms noted.    Per chart review, patient visited [facility] on 11/16/2023 with concerns for jaundice. He was scheduled for ERCP on 11/27/2023.     CT ABDOMEN PELVIS W CONTRAST (11/17/2023):  IMPRESSION:   1.  Large cystic mass along the course of the common bile duct could represent a choledochocele versus cystic pancreatic head neoplasm. This results in obstruction and dilatation of the intrahepatic bile ducts as well as the pancreatic duct. GI   consultation recommended.  2.  Gallbladder distention.  3.  Marked distention of the urinary bladder likely secondary to outlet obstruction from an enlarged prostate gland, and would account for mild bilateral ureteral dilatation and hydronephrosis.  4.  Constipation.  5.  Moderate-sized esophageal hiatal hernia.    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    SURGICAL PATHOLOGY EXAM  5-9-6    Rt     amoxicillin (AMOXIL) 500 MG capsule  ascorbic acid (VITAMIN C) 500 MG tablet  Aspirin (ASPIR-81 PO)  Calcium Carbonate (CALCIUM 500 PO)  cholecalciferol (VITAMIN D) 400 UNIT TABS  COD LIVER OIL PO  magnesium 250 MG tablet  Multiple Vitamin (MULTI-VITAMIN) per tablet  omega-3 fatty acids (FISH OIL) 1200 MG capsule  phytonadione (MEPHYTON) 5 MG tablet  PSYLLIUM PO      No Known Allergies  Family History  No family history on file.  Social History   Social History     Tobacco Use    Smoking status: Never    Smokeless tobacco: Never   Substance Use Topics    Alcohol use: Yes     Comment: once a week, a glass of wine during weekend    Drug use: No         A complete review of systems was performed with pertinent positives and negatives noted in the HPI, and all other systems negative.    Physical Exam   BP: (!) 145/66  Pulse: 65  Resp: 15  SpO2: 92 %  Physical Exam  Constitutional:       General: He is not in acute distress.     Appearance: He is well-developed. He is not diaphoretic.   HENT:      Head: Normocephalic and atraumatic.      Mouth/Throat:      Pharynx: No oropharyngeal exudate.   Eyes:      General: Scleral icterus present.         Right eye: No discharge.         Left eye: No discharge.      Pupils: Pupils are equal, round, and reactive to light.   Cardiovascular:      Rate and Rhythm: Normal rate and regular rhythm.      Heart sounds: Normal heart sounds. No murmur heard.     No friction rub. No gallop.   Pulmonary:      Effort: Pulmonary effort is normal. No respiratory distress.      Breath sounds: Normal breath sounds. No wheezing.   Chest:      Chest wall: No tenderness.   Abdominal:      General: Bowel sounds are normal. There is no distension.      Palpations: Abdomen is soft.      Tenderness: There is no  abdominal tenderness.   Musculoskeletal:         General: No tenderness or deformity. Normal range of motion.      Cervical back: Normal range of motion and neck supple.      Right lower leg: Edema present.      Left lower leg: Edema present.      Comments: Bilateral edema of lower extremities.  Erythema and skin breakdown to right lower extremity.   Skin:     General: Skin is warm and dry.      Coloration: Skin is jaundiced. Skin is not pale.      Findings: No erythema or rash.   Neurological:      Mental Status: He is alert and oriented to person, place, and time.      Cranial Nerves: No cranial nerve deficit.           ED Course, Procedures, & Data      Procedures                      Results for orders placed or performed during the hospital encounter of 11/27/23   [place] Draw     Status: None ()    Narrative    The following orders were created for panel order [place] Draw.  Procedure                               Abnormality         Status                     ---------                               -----------         ------                     Extra Blue Top Tube[[id number]]                                                         Extra Red Top Tube[[id number]]                                                          Extra Green Top (Lithium...[[id number]]                                                 Extra Purple Top Tube[[id number]]                                                       Extra Urine Collection[[id number]]                                                        Please view results for these tests on the individual orders.     Medications - No data to display  Labs Ordered and Resulted from Time of ED Arrival to Time of ED Departure - No data to display  No orders to display              Assessment & Plan    Is a 89-year-old male who presents due to generalized weakness as well as leg swelling.  This has been worsening for the past 11 days.  Patient was recently diagnosed with biliary obstruction  and is in the process of being worked up for this.  He has CRP scheduled for tomorrow.  He notes worsening leg swelling which is more notable on the right.  He has erythema and skin breakdown on the right side however he has bilateral peripheral edema.  Patient is also jaundiced.  Lab work shows total bilirubin of 6.4 which is improved from previous.  WBC count is 14.3.  Due to concerns for cellulitis to the right lower extremity patient was given ceftriaxone and vancomycin.  Bilateral lower extremity ultrasound have been ordered and are pending at this time. We will admit for further monitoring work-up and treatment.     I have reviewed the nursing notes. I have reviewed the findings, diagnosis, plan and need for follow up with the patient.    New Prescriptions    No medications on file       Final diagnoses:   None   DANILO MORRIS, am serving as a trained medical scribe to document services personally performed by Alton Verdugo DO, based on the provider's statements to me.      Alton MORRIS DO, was physically present and have reviewed and verified the accuracy of this note documented by DANILO CANDELARIA.    Alton Verudgo DO  [facility] EMERGENCY DEPARTMENT  11/27/2023     Alton Verdugo DO  11/27/23 2008

## 2023-11-28 NOTE — MEDICATION SCRIBE - ADMISSION MEDICATION HISTORY
Medication Scribe Admission Medication History    Admission medication history is complete. The information provided in this note is only as accurate as the sources available at the time of the update.    Information Source(s): Patient via in-person    Pertinent Information: Pt reported taking medications on PTA medication list as directed. Pt stated that he no longer taking Amoxicillin 500 mg, Asprin 81 mg.    Changes made to PTA medication list:  Added: None  Deleted: Amoxicillin 500 mg, Asprin 81 mg.   Changed: None    Medication Affordability:  Not including over the counter (OTC) medications, was there a time in the past 3 months when you did not take your medications as prescribed because of cost?: No    Allergies reviewed with patient and updates made in EHR: yes    Medication History Completed By: Izzy Tsai 11/28/2023 7:17 AM    PTA Med List   Medication Sig Last Dose    ascorbic acid (VITAMIN C) 500 MG tablet Take 1 tablet by mouth daily. 11/27/2023 at AM    Calcium Carbonate (CALCIUM 500 PO) Take 3 tablets by mouth daily. 11/27/2023 at AM    cholecalciferol (VITAMIN D) 400 UNIT TABS Take 400 Units by mouth daily. 11/27/2023 at AM    COD LIVER OIL PO Take 400 mg by mouth daily. 11/27/2023 at AM    magnesium 250 MG tablet Take  by mouth daily. 3 per week 11/27/2023 at AM    Multiple Vitamin (MULTI-VITAMIN) per tablet Take by mouth daily 4 per week 11/27/2023 at AM    omega-3 fatty acids (FISH OIL) 1200 MG capsule Take 1 capsule by mouth daily. 11/27/2023 at AM    PSYLLIUM PO Fiber supplement 11/27/2023 at AM

## 2023-11-28 NOTE — ANESTHESIA POSTPROCEDURE EVALUATION
Patient: Emigdio Hannah    Procedure: Procedure(s):  ENDOSCOPIC ULTRASOUND,EGD,bile duct drainage, stent placement       Anesthesia Type:  General    Note:  Disposition: Outpatient   Postop Pain Control: Uneventful            Sign Out: Well controlled pain   PONV: No   Neuro/Psych: Uneventful            Sign Out: Acceptable/Baseline neuro status   Airway/Respiratory: Uneventful            Sign Out: Acceptable/Baseline resp. status   CV/Hemodynamics: Uneventful            Sign Out: Acceptable CV status; No obvious hypovolemia; No obvious fluid overload   Other NRE: NONE   DID A NON-ROUTINE EVENT OCCUR? No           Last vitals:  Vitals Value Taken Time   BP     Temp     Pulse     Resp     SpO2 100 % 11/28/23 1620   Vitals shown include unfiled device data.    Electronically Signed By: Alex Hernandes MD  November 28, 2023  4:21 PM

## 2023-11-28 NOTE — PROGRESS NOTES
[facility]    Medicine Progress Note - Hospitalist Service    Date of Admission:  11/27/2023    Assessment & Plan    Emigdio Hannah is a 89 year old male with history of recent onset of jaundice and and presents for evaluation of generalized weakness and found to have ERIC and bilateral lower extremity pitting edema of unclear etiology. Will give small fluid challenge for ERIC. Ordered RUQ US to evaluate for PVT as etiology of LE edema.     # painless jaundice  # Cystic pancreatic head mass   Mass noted on recent CT scan, acholic stools, elevated total bilirubin, and CBD obstruction on recent CT are concerning for pancreatic head malignancy. Recent Bilirubin up to 18.1 on 11/16 but dropped to 4.6 as of today. CT repeated today showed similar appearance of suspected pancreatic mass and persistent dilation of pancreatic duct.   - Appreciate assistance from GI Panc/Bili team   - Pt will undergo ERCP today   - Heparin drip stopped and pt received Vitamin K x 1 prior to procedure   - NPO at midnight  - RUQ US w/doppler  - CMP daily     # bilateral LE pitting edema  No evidence of HF on exam. No recent echocardiogram.   - RUQ as above  - consider TTE tomorrow   - consider WOC consult given scattered wounds on bl LE     # ERIC  # possible urinary retention  Bladder distended on CT and endorses difficult with initiating urinary stream. Cr significantly elevated to 2.83 from baseline of ~1. Possible prerenal in setting of poor oral intake, however, with degree of retention plus rapid rise in Cr cannot r/o obstructive cause.   - Sandoval placed on admission  - Repeat CT today shows bladder is decompressed   - Cr down 2.93-> 2.36  - bladder protocol (bladder scan and straight cath prn  - CMP, Mg, Phos in AM  - 500 mL LR bolus to be infused over 4 hours    # acute occlusive superficial thrombus of left gastrocnemius vein  Noted on bl LE US ordered by ED. This exam was negative for deep  venous thrombosis. Given the clot appears superficial and he may have a procedure tomorrow will avoid therapeutic AC.   - Stopped heparin drip pre-procedure  - no need to anticoagulate for superficial thrombosis  - Should repeat LE US in 1-2 weeks, or sooner if indicated, to evaluate for clot propagation      # cholethiasis  # leukocytosis  Unclear etiology of leukocytosis. UA was unremarkable. No other evidence of infection. Cholethiasis and gallbladder distention noted on recent CT, but no abdominal pain, nausea or vomiting. Will cover empirically for possible intra-abdominal infection for now. PVT could also explain leukocytosis.  - Continue CTX and vancomycin for now   - daily cbc  - blood cultures w/ NGTD (blood cultures drawn after Abx started)         Diet: NPO per Anesthesia Guidelines for Procedure/Surgery Except for: Meds    DVT Prophylaxis: Pneumatic Compression Devices  Sandoval Catheter: PRESENT, indication: Retention  Lines: None     Cardiac Monitoring: None  Code Status: Full Code      Disposition Plan     Expected Discharge Date: 11/29/2023                The patient's care was discussed with staff physician, Attending Physician, Dr. Geiger, Bedside Nurse, Care Coordinator/, Patient, and GI Consultant(s).    Osvaldo Dupree PA-C  Hospitalist Service  [facility]  Securely message with [facility] (more info)  Text page via [employer] Paging/Directory     Clinically Significant Risk Factors Present on Admission           # Hypercalcemia: corrected calcium is >10.1, will monitor as appropriate    # Hypoalbuminemia: Lowest albumin = 3.2 g/dL at 11/28/2023 12:34 PM, will monitor as appropriate  # Coagulation Defect: INR = 1.31 (Ref range: 0.85 - 1.15) and/or PTT = 36 Seconds (Ref range: 22 - 38 Seconds), will monitor for bleeding                    ______________________________________________________________________    Interval History     Patient reports  overall feeling okay today.  Continues to deny abdominal pain, says is the right abdominal pain.  Does have some nausea.  He is anxious to get going with ERCP today    Data reviewed today: I reviewed all medications, new labs and imaging results over the last 24 hours. Please see discussion of these results in the A/P.    Physical Exam     Vital Signs: Temp: 98.4  F (36.9  C) Temp src: Oral BP: 135/66 Pulse: 67     Resp: 18 SpO2: 99 % O2 Device: None (Room air)    Weight: 0 lbs 0 oz    Constitutional: awake, alert, cooperative, in no acute distress. A&Ox3. Jaundiced   Eyes: EOM, PERRLA. Sclera clear, conjunctiva normal. Anicteric   HENT: Normocephalic, without obvious abnormality, atraumatic.   Respiratory: No increased work of breathing.   GI: Soft, non-tender without rebound or guarding.   Musculoskeletal:  Full range of motion noted.    Neurologic: Cranial nerves II-XII are grossly intact.   Neuropsychiatric: General: normal, calm and normal eye contact. Normal affect        Data   Recent Labs   Lab 11/28/23  1234 11/28/23  0632 11/27/23  1533   WBC 12.4* 12.0* 14.3*   HGB 9.8* 10.4* 11.1*   MCV 94 95 94    181 211   INR 1.31* 1.28*  --     136 136   POTASSIUM 4.3 4.8 5.0   CHLORIDE 102 100 98   CO2 26 24 23   BUN 63.6* 71.5* 66.6*   CR 2.36* 2.93* 2.83*   ANIONGAP 11 12 15   YG 9.1 9.4 9.7*   * 126* 144*   ALBUMIN 3.2* 3.4* 4.0   PROTTOTAL 6.3* 6.6 8.0   BILITOTAL 4.6* 4.9* 6.4*   ALKPHOS 325* 348* 454*   ALT 49 55 73*   AST 28 30 44   LIPASE 37  --  43       Recent Results (from the past 24 hour(s))   US Lower Extremity Venous Duplex Bilateral    Narrative    EXAMINATION: DOPPLER VENOUS ULTRASOUND OF BILATERAL LOWER EXTREMITIES,  11/27/2023 7:14 PM     COMPARISON: None.    HISTORY: Lower extremity swelling/pain, more notable on R.    TECHNIQUE:  Gray-scale evaluation with compression, spectral flow and  color Doppler assessment of the deep venous system of both legs from  groin to knee, and  then at the ankles.    FINDINGS:  In both lower extremities, the common femoral, femoral, popliteal and  posterior tibial veins demonstrate normal compressibility with  flattened Doppler waveforms. Within the left calf there is a  gastrocnemius vein with no compression. Within the right popliteal  fossa there is a fluid collection measuring 3.2 x 1.2 x 2.6 cm. In the  left popliteal fossa there is a 4.1 x 2.4 x 4.7 cm fluid collection.  There is extensive right leg subcutaneous edema.      Impression    IMPRESSION:  1.  There is acute occlusive venous thrombosis of the left  gastrocnemius vein. No deep vein thrombosis identified. Follow-up  recommended to exclude progression of the gastrocnemius vein clot.  2.  Left greater than right subcutaneous edema.  3.  Right and left Baker cyst.  4.  Flattened bilateral waveforms may be physiologic although can be  seen with obstruction in the pelvis or higher. CT of 11/17/23 did show  markedly distended bladder. Correlate with bladder volume and consider  pelvic ultrasound.    Findings of venous thrombus of the left gastrocnemius vein discussed  with Dr. Piedra at 7:30 PM on 11/26/2023 by Dr. Seferino Martínez    Findings flattened waveforms concerning for obstruction of pelvis or  higher was discussed with Dr. Baker at 7:47 PM by Dr. Seferino Martínez.      I have personally reviewed the examination and initial interpretation  and I agree with the findings.    KHADAR SINHA MD         SYSTEM ID:  [id number]   US Abdomen Limited w Abdomen Doppler Limited    Narrative    EXAMINATION: US ABDOMEN LIMITED W ABDOMEN DOPPLER LIMITED 11/28/2023  8:41 AM     COMPARISON: CT abdomen and pelvis 11/17/2023.    HISTORY: jaundice, acholic stools, elevated bilirubin    TECHNIQUE: The abdomen was scanned in standard fashion with  specialized ultrasound transducer(s) using both gray-scale, color  Doppler, and spectral flow techniques.    Findings:  Doppler Evaluation:   Splenic  Vein: Patent with antegrade flow measuring 26 cm/s.    Portal Venous Flow:  Main Portal Vein: Patent with antegrade flow, 30 cm/sec.  Right Portal Vein: Patent with antegrade flow, 22 cm/sec.  Left Portal Vein: Patent with antegrade flow, 15 cm/sec.    Hepatic Veins:  The right, middle, and left hepatic veins are patent with flow towards  the IVC. IVC is patent.    Hepatic Arterial Flow:  Hepatic Artery: Resistive Index of 0.84, peak systolic velocity  73  cm/sec.  Right Hepatic Artery: Resistive Index of 0.76, peak systolic velocity   41 cm/sec.  Left Hepatic Artery: Not well seen.    Gray-scale Qualitative Analysis:  Liver: Hepatic parenchyma is of normal echogenicity without evidence  of focal mass. The liver measures 14.7 cm the cephalocaudal direction.  Main portal vein measures 1.1 cm and is not dilated.    Gallbladder: The gallbladder is mildly distended, there is layering  sludge within the dependent portions. The gallbladder wall is  borderline thickened measuring 3 mm. No pericholecystic fluid.    Bile Ducts: Mild intrahepatic biliary ductal dilation. No extrahepatic  biliary ductal dilation. The common bile duct measures 8 mm.    Pancreas: There are 2 rounded well-circumscribed cysts of the  pancreatic head, the larger measuring 4.2 x 3.6 x 5.2 cm with thin  septations, and the smaller measuring 0.8 x 0.7 x 0.7 cm. No solid  components within the cyst. Borderline dilation of the pancreatic duct  measuring 3 mm.    Kidneys: The right kidney measures 10.7 cm in length, and demonstrates  mildly echogenic parenchyma there is mild hydronephrosis of the right  kidney. No shadowing lithiasis, no definitive renal masses.    Aorta and IVC: The visualized portions of the aorta and IVC are  unremarkable. See measurements above.      Impression    Impression:   1. Similar to prior CT 11/17/2023, large pancreatic head cyst  measuring up to 5.2 cm with thin septations and no solid components,  likely represents cystic  neoplasm of the pancreas. Differential  diagnosis-pseudocyst. There is associated mild intrahepatic biliary  ductal dilation. The pancreatic duct is borderline enlarged measuring  3 mm.  2. Biliary sludge without definitive evidence for acute cholecystitis.  3. Unremarkable Doppler evaluation of the liver/right upper quadrant.  4. Mild hydronephrosis of the right kidney    I have personally reviewed the examination and initial interpretation  and I agree with the findings.    PARIS DOE MD         SYSTEM ID:  [id number]   CT Abdomen Pelvis w/o Contrast    Narrative    EXAMINATION: CT ABDOMEN PELVIS W/O CONTRAST, 11/28/2023 9:16 AM    INDICATION: Interval evaluation of the pancreasbiliary pathology seen  on last CT    COMPARISON STUDY: 11/17/2023    TECHNIQUE: CT scan of the abdomen and pelvis was performed on  multidetector CT scanner using volumetric acquisition technique and  images were reconstructed in multiple planes with variable thickness  and reviewed on dedicated workstations.     CONTRAST: None    CT scan radiation dose is optimized to minimum requisite dose using  automated dose modulation techniques.    FINDINGS:    Lower thorax: Moderate-sized hiatal hernia.  Basilar atelectasis.    Liver: Intrahepatic ductal dilation appears similar though was not  well characterized without contrast. No hepatic mass seen on  unenhanced CT    Biliary System: Gallbladder is distended measuring up to 5.3 cm in  transverse diameter. Layering sludge. Extrahepatic biliary dilation  appears similar comparison though evaluation is suboptimal without  contrast.    Pancreas: Cystic mass in the pancreatic head and neck measuring up to  4.8 cm is similar to previous exam when it measured up to 5 cm in  similar dimensions. There is pancreatic ductal dilation proximal to  the cystic mass which is less well characterized on the current exam.  Some degree of atrophy in the pancreatic tail suspected.    Adrenal glands: No mass or  nodules    Spleen: Normal.    Kidneys: Mild bilateral hydronephrosis is again seen which is similar  or slightly improved from comparison. The proximal ureters are  dilated. Noncontrast appearance of kidneys is otherwise unremarkable.    Gastrointestinal tract: Large volume stool in the colon.. Normal  caliber small bowel.    Mesentery/peritoneum/retroperitoneum: No mass. No free fluid or air.    Lymph nodes: No significant lymphadenopathy.    Vasculature: Scattered atherosclerotic vascular calcifications.    Pelvis: Sandoval catheter in the bladder which is now decompressed.  Diffuse bladder wall thickening from chronic outlet obstruction. Large  prostate.    Osseous structures: Degenerative changes in the hips and spine.      Soft tissues: Mild anasarca.      Impression    IMPRESSION:   1. Revisualization of cystic pancreatic head mass which is overall  similar in size. Continued dilation of the pancreatic duct and common  bile duct with severe gallbladder distention, likely related to the  mass. 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 Overall  appearance is similar to comparison though evaluation without contrast  there is suboptimal.   2. The bladder is now decompressed with Sandoval catheter in place. There  is continued at least mild bilateral hydronephrosis which is similar  or slightly improved following bladder decompression.  3. Moderate hiatal hernia.  4. Large volume stool throughout colon.    CORINA DINH MD         SYSTEM ID:  [id number]

## 2023-11-28 NOTE — CONSULTS
GASTROENTEROLOGY CONSULTATION      Date of Admission:  11/27/2023          ASSESSMENT AND RECOMMENDATIONS:     89 year old male with a history of new onset jaundice found during his routine outpatient appointment for which he was scheduled for an outpatient ERCP, who now presents with generalized weakness and was found to have ERIC and superficial LE VT for which he was admitted for further management. GI now consulted for consideration of ERCP for jaundice.       Jaundice   Cystic Pancreatic head mass   CBD and PD Dilation   Distended Gall Bladder     Patient recently presented to his outpatient clinic appointment and was found to have jaundice with a total bilirubin of 18.1 on 11/6    Follow up cross sectional imaging revealed Large cystic mass along the course of the common bile duct could represent a choledochocele versus cystic pancreatic head neoplasm. This results in obstruction and dilatation of the intrahepatic bile ducts as well as the pancreatic duct. There is also evidence of gallbladder distention      Patient was planned for an outpatient ERCP for further evaluation based on CT findings.     He unfortunately presented to the hospital with generalized weakness yesterday prior to his scheduled ERCP today and was admitted with ERIC and superficial VT    Labs on admission revealed down trending bili 18.1--> 6.4--> 4.9 today, we were initially concerned for a fistulated cyst which could potentially explain the down trending bili, we obtained a repeat CT scan today which continued to show persistent findings similar to previous CT    Given this we will plan to proceed with EUS vs ERCP drainage of the biliary system today.     Patient appears clinically stable at this time.      RECOMMENDATIONS  --Proceed with EUS/ERCP today   --Vitamin K X1  --Hold anticoagulation   --Monitor LFTs   --Supportive care per the primary team    Thank you for involving us in this patient's care. Please do not hesitate to  contact the GI service with any questions or concerns.     Patient care plan discussed with Dr. Cortez, GI staff physician.    Kee Cortez MD  Gastroenterology Fellow  Pager             Chief Complaint:   We were asked by Alex Spangler MD  of the [facility] team to evaluate this patient with painless jaundice    History is obtained from the patient and the medical record.          History of Present Illness:   Emigdio Hannah is a 89 year old male with a history of new onset jaundice found during his routine outpatient appointment for which he was scheduled for an outpatient ERCP, who now presents with generalized weakness and was found to have ERIC and superficial LE VT for which he was admitted for further management. GI now consulted for consideration of ERCP for jaundice.     Patient is hard of hearing and it was a little challenging to obtain a history from him.  Overall denies any significant GI related symptoms at this time.     We obtained a repeat CT scan today which continued to show persistent findings similar to previous CT    Given this we will plan to proceed with EUS vs ERCP drainage of the biliary system today.           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    SURGICAL PATHOLOGY EXAM  5-9-6    Rt            Previous Endoscopy:   No results found for this or any previous visit.         Social History:   Reviewed and edited as appropriate  Social History     Socioeconomic History    Marital status:      Spouse name: Not on file    Number of children: Not on file    Years of education: Not on  file    Highest education level: Not on file   Occupational History    Not on file   Tobacco Use    Smoking status: Never    Smokeless tobacco: Never   Substance and Sexual Activity    Alcohol use: Yes     Comment: once a week, a glass of wine during weekend    Drug use: No    Sexual activity: Never   Other Topics Concern    Parent/sibling w/ CABG, MI or angioplasty before 65F 55M? No   Social History Narrative    Not on file     Social Determinants of Health     Financial Resource Strain: Not on file   Food Insecurity: Not on file   Transportation Needs: Not on file   Physical Activity: Not on file   Stress: Not on file   Social Connections: Not on file   Interpersonal Safety: Not on file   Housing Stability: Not on file            Family History:   Reviewed and edited as appropriate  No known history of gastrointestinal/liver disease or  gastrointestinal malignancies       Allergies:   Reviewed and edited as appropriate   No Known Allergies         Review of Systems:     A complete review of systems was performed and is negative except as noted in the HPI           Physical Exam:   /59   Pulse 71   Temp 98.4  F (36.9  C) (Oral)   Resp 18   SpO2 99%   Wt:   Wt Readings from Last 2 Encounters:   11/21/23 57.6 kg (127 lb)   11/16/23 56.2 kg (124 lb)      Constitutional: Not dyspneic/diaphoretic, no acute distress  Eyes: Sclera anicteric/injected  Ears/nose/mouth/throat: Mucus membranes moist  Neck: supple  CV: Not tachycardic  Respiratory: Unlabored breathing  Abdomen: Soft, Non distended,  nontender, no peritoneal signs  Skin: warm, perfused, no jaundice  Neuro: No FND  Psych: Normal affect       Data:   Labs and imaging below were independently reviewed and interpreted    BMP  Recent Labs   Lab 11/28/23  0632 11/27/23  1533    136   POTASSIUM 4.8 5.0   CHLORIDE 100 98   YG 9.4 9.7*   CO2 24 23   BUN 71.5* 66.6*   CR 2.93* 2.83*   * 144*     CBC  Recent Labs   Lab 11/28/23  0632  11/27/23  1533   WBC 12.0* 14.3*   RBC 3.37* 3.55*   HGB 10.4* 11.1*   HCT 31.9* 33.5*   MCV 95 94   MCH 30.9 31.3   MCHC 32.6 33.1   RDW 17.4* 17.6*    211     INR  Recent Labs   Lab 11/28/23  0632   INR 1.28*     LFTs  Recent Labs   Lab 11/28/23  0632 11/27/23  1533   ALKPHOS 348* 454*   AST 30 44   ALT 55 73*   BILITOTAL 4.9* 6.4*   PROTTOTAL 6.6 8.0   ALBUMIN 3.4* 4.0      PANC  Recent Labs   Lab 11/27/23  1533   LIPASE 43       Imaging:     CT Abdomen 11/28    IMPRESSION:   1. Revisualization of cystic pancreatic head mass which is overall  similar in size. Continued dilation of the pancreatic duct and common  bile duct with severe gallbladder distention, likely related to the  mass. 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 Overall  appearance is similar to comparison though evaluation without contrast  there is suboptimal.

## 2023-11-28 NOTE — H&P
[facility]    History and Physical - Medicine Service, [facility] TEAM        Date of Admission:  11/27/2023    Assessment & Plan      Emigdio Hannah is a 89 year old male with history of recent onset of jaundice and and presents for evaluation of generalized weakness and found to have ERIC and bilateral lower extremity pitting edema of unclear etiology. Will give small fluid challenge for ERIC. Ordered RUQ US to evaluate for PVT as etiology of LE edema.    # jaundice  # c/f pancreatic neoplasm  Mass noted on recent CT scan, acholic stools, elevated total bilirubin, and CBD obstruction on recent CT are concerning for pancreatic head malignancy. Given new onset bilateral LE pitting edema there is concern for PVT, so will obtain RUQ US to rule this out.   - GI panc/bili consult placed. Recommend discussing with GI early in AM regarding planned ERCP  - NPO at midnight  - RUQ US w/doppler  - CMP daily    # bilateral LE pitting edema  No evidence of HF on exam. C/f possible PVT and will obtain US to rule this out.   - RUQ as above  - consider TTE   - consider WOC consult given scattered wounds on bl LE    # ERIC  # possible urinary retention  Bladder appears distended on exam and endorses difficult with initiating urinary stream. Cr significantly elevated to 2.83 from baseline of ~1. Likely prerenal in setting of poor PO intake but cannot r/o postrenal obstruction as etiology.    - bladder protocol (bladder scan and straight cath prn  - CMP, Mg, Phos in AM  - 500 mL LR bolus to be infused over 4 hours    # cholethiasis  # leukocytosis  Unclear etiology of leukocytosis. UA was unremarkable. No other evidence of infection. Cholethiasis and gallbladder distention noted on recent CT, but no abdominal pain, nausea or vomiting. Will cover empirically for possible intra-abdominal infection for now. PVT could also explain leukocytosis.  - daily cbc  - blood cultures  - s/p cftx and vanc  in ED. Will continue both of these for now. Ordered blood cultures but likely low yield since already got abx    # acute occlusive thrombus of left gastrocnemius vein  Noted on bl LE US ordered by ED. This exam was negative for deep venous thrombosis. Given the clot appears superficial and he may have a procedure tomorrow will avoid therapeutic AC.   - ordered prophylactic dose subcutaneous heparin        Diet:  NPO at midnight  DVT Prophylaxis: subcutaneous heparin  Sandoval Catheter: Not present  Fluids: none  Lines: None     Cardiac Monitoring: None  Code Status:  full code    Clinically Significant Risk Factors Present on Admission                # Drug Induced Platelet Defect: home medication list includes an antiplatelet medication  # Acute Kidney Injury, unspecified: based on a >150% or 0.3 mg/dL increase in last creatinine compared to past 90 day average, will monitor renal function                  Disposition Plan      Expected Discharge Date: 11/29/2023                The patient's care was discussed with the Attending Physician, Dr. Nice .      Alex Spangler MD  PGY-1 Internal Medicine  Medicine Service, [facility]  Securely message with [employer] (more info)  Text page via [facility] Paging/Directory   See signed in provider for up to date coverage information  ______________________________________________________________________    Chief Complaint   jaundice    History is obtained from the patient and chart review    History of Present Illness   Emigdio Hannah is a 89 year old male with history of recent onset of jaundice and and presents for evaluation of generalized weakness. Jaundice started several weeks ago and was seen by PCP on 11/16/23 for evaluation of jaundice. 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 Labs on 11/16/23 notable for elevated alk phos of 1074, AST of 174, ALT of 221, and total bilirubin of 18.1. Had ERCP scheduled as outpatient for 11/27/23 here. Labs in ED showed WBC of 14.3, Hgb of 11.1, BUN of 66.6, Cr of 2.83, Ca of 9.7, Alk phos of 454, AST of 44, ALT of 73, and total bilirubin of 6.4.     Endorses pain in both legs with walking which he attributes to the swelling. Also reports difficulty with starting urinary stream. Denies SOB at rest or with exertion, cough, chest pain, fevers, chills, dysuria, nausea, vomiting, abdominal pain, or diarrhea.       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     SURGICAL PATHOLOGY EXAM  5-9-6    Rt       Prior to Admission Medications   Prior to Admission Medications   Prescriptions Last Dose Informant Patient Reported? Taking?   Aspirin (ASPIR-81 PO)   Yes No   Sig: Take 81 mg by mouth 4 per week.    Patient not taking: Reported on 11/21/2023   COD LIVER OIL PO   Yes No   Sig: Take 400 mg by mouth daily.   Calcium Carbonate (CALCIUM 500 PO)   Yes No   Sig: Take 3 tablets by mouth daily.   Multiple Vitamin (MULTI-VITAMIN) per tablet   Yes No   Sig: Take  by mouth daily. 4 per week   Patient not taking: Reported on 11/16/2023   PSYLLIUM PO   Yes No   Sig: Take  by mouth. Fiber supplement   Patient not taking: Reported on 11/16/2023   amoxicillin (AMOXIL) 500 MG capsule   No No   Sig: Take 1 capsule (500 mg) by mouth 2 times daily   Patient not taking: Reported  on 6/8/2020   ascorbic acid (VITAMIN C) 500 MG tablet   Yes No   Sig: Take 1 tablet by mouth daily.   cholecalciferol (VITAMIN D) 400 UNIT TABS   Yes No   Sig: Take 400 Units by mouth daily.   magnesium 250 MG tablet   Yes No   Sig: Take  by mouth daily. 3 per week   omega-3 fatty acids (FISH OIL) 1200 MG capsule   Yes No   Sig: Take 1 capsule by mouth daily.   phytonadione (MEPHYTON) 5 MG tablet   No No   Sig: Take 2 tablets (10 mg) by mouth once for 1 dose      Facility-Administered Medications: None        Review of Systems    Negative except as above in HPI     Physical Exam   Vital Signs: Temp: 97.5  F (36.4  C) Temp src: Oral BP: (!) 145/74 Pulse: 74   Resp: (!) 9 SpO2: 99 % O2 Device: None (Room air)    Weight: 0 lbs 0 oz    Constitutional: awake, alert, cooperative, no apparent distress, and appears stated age  Eyes: sclera icteric  Respiratory: No increased work of breathing, good air exchange, clear to auscultation bilaterally, no crackles or wheezing  Cardiovascular: RRR, no murmurs, rubs or gallops  GI: soft, non-tender, non-distended  Genitounirinary: bladder distended, slight suprapubic tenderness  Skin: jaundiced, bilateral lower extremities are have scattered erythematous wounds that are not actively bleeding  Musculoskeletal: no peripheral edema  Neurologic: no focal deficits    Medical Decision Making           Data     I have personally reviewed the following data over the past 24 hrs:    14.3 (H)  \   11.1 (L)   / 211     136 98 66.6 (H) /  144 (H)   5.0 23 2.83 (H) \     ALT: 73 (H) AST: 44 AP: 454 (H) TBILI: 6.4 (H)   ALB: 4.0 TOT PROTEIN: 8.0 LIPASE: 43     Procal: N/A CRP: N/A Lactic Acid: 1.9         Imaging results reviewed over the past 24 hrs:   Recent Results (from the past 24 hour(s))   US Lower Extremity Venous Duplex Bilateral    Narrative    EXAMINATION: DOPPLER VENOUS ULTRASOUND OF BILATERAL LOWER EXTREMITIES,  11/27/2023 7:14 PM     COMPARISON: None.    HISTORY: Lower extremity  swelling/pain, more notable on R.    TECHNIQUE:  Gray-scale evaluation with compression, spectral flow and  color Doppler assessment of the deep venous system of both legs from  groin to knee, and then at the ankles.    FINDINGS:  In both lower extremities, the common femoral, femoral, popliteal and  posterior tibial veins demonstrate normal compressibility with  flattened Doppler waveforms. Within the left calf there is a  gastrocnemius vein with no compression. Within the right popliteal  fossa there is a fluid collection measuring 3.2 x 1.2 x 2.6 cm. In the  left popliteal fossa there is a 4.1 x 2.4 x 4.7 cm fluid collection.  There is extensive right leg subcutaneous edema.      Impression    IMPRESSION:  1.  There is acute occlusive venous thrombosis of the left  gastrocnemius vein. No deep vein thrombosis identified. Follow-up  recommended to exclude progression of the gastrocnemius vein clot.  2.  Left greater than right subcutaneous edema.  3.  Right and left Baker cyst.  4.  Flattened bilateral waveforms may be physiologic although can be  seen with obstruction in the pelvis or higher. CT of 11/17/23 did show  markedly distended bladder. Correlate with bladder volume and consider  pelvic ultrasound.    Findings of venous thrombus of the left gastrocnemius vein discussed  with Dr. Piedra at 7:30 PM on 11/26/2023 by Dr. Seferino Martínez       Internal Medicine Staff Addendum  Date of Service: 11/27/2023  I have seen and examined Emigdio Hannah with the resident team, reviewed the data and discussed the plan of care with the patient and the care team on P&FC Rounds.  I agree with the above documentation     I discussed pt's care with bedside RN, case management/social work today.  I personally reviewed labs, medications and past 24 hr notes.  Assessment/Plan/Diagnoses: plan/dx as above, which contains my edits and reflects our joint medical decision-making.     Juarez Nice MD  Internal Medicine/Pediatrics  Hospitalist & Staff Physician   of Internal Medicine and Pediatrics  [facility]  Pager: [phone]

## 2023-11-28 NOTE — ANESTHESIA PREPROCEDURE EVALUATION
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 This results in obstruction and dilatation of the intrahepatic bile ducts as well as the pancreatic duct. There is also evidence of gallbladder distention       Past Medical History:   Diagnosis Date    AR (allergic rhinitis)     Hearing loss     Squamous cell carcinoma in situ     right upper back -- excised       Past Surgical History:   Procedure Laterality Date    CATARACT IOL, RT/LT   &     Both eyes    HERNIA REPAIR, INGUINAL RT/LT      Rt.    SURGICAL PATHOLOGY EXAM      Rt      No Known Allergies   Social History     Tobacco Use    Smoking status: Never    Smokeless tobacco: Never   Substance Use Topics    Alcohol use: Yes     Comment: once a week, a glass of wine during weekend      Wt Readings from Last 1 Encounters:   23 57.6 kg (127 lb)        Anesthesia Evaluation            ROS/MED HX  ENT/Pulmonary:  - neg pulmonary ROS     Neurologic:  - neg neurologic ROS     Cardiovascular:  - neg cardiovascular ROS   (+)  - -   -  - -                                 Previous cardiac testing   Echo: Date: 2018 Results:   Exam Date: 2018    Normal LV size and systolic function with estimated ejection fraction of 60-65%.    Structurally normal aortic valve without significant sclerosis or stenosis.    Mild aortic regurgitation.    Mild mitral regurgitation.    Estimated pulmonary artery pressure of 42 mmHg + RA pressure.    Aortic root diameter measures 3.3 cm.    Normal inferior vena cava (IVC) size.     "Estimated EF: 60-65%     Stress Test:  Date: Results:    ECG Reviewed:  Date: Results:    Cath:  Date: Results:      METS/Exercise Tolerance: >4 METS    Hematologic:  - neg hematologic  ROS     Musculoskeletal:  - neg musculoskeletal ROS     GI/Hepatic: Comment: Food related GERD - neg GI/hepatic ROS   (+) GERD, Other,           liver disease,       Renal/Genitourinary:  - neg Renal ROS     Endo:  - neg endo ROS     Psychiatric/Substance Use:  - neg psychiatric ROS     Infectious Disease:  - neg infectious disease ROS     Malignancy:  - neg malignancy ROS     Other:  - neg other ROS          Physical Exam    Airway        Mallampati: II   TM distance: > 3 FB   Neck ROM: limited   Mouth opening: > 3 cm    Respiratory Devices and Support         Dental       (+) Multiple crowns, permanant bridges      Cardiovascular   cardiovascular exam normal          Pulmonary   pulmonary exam normal                OUTSIDE LABS:  CBC:   Lab Results   Component Value Date    WBC 12.4 (H) 11/28/2023    WBC 12.0 (H) 11/28/2023    HGB 9.8 (L) 11/28/2023    HGB 10.4 (L) 11/28/2023    HCT 29.8 (L) 11/28/2023    HCT 31.9 (L) 11/28/2023     11/28/2023     11/28/2023     BMP:   Lab Results   Component Value Date     11/28/2023     11/27/2023    POTASSIUM 4.8 11/28/2023    POTASSIUM 5.0 11/27/2023    CHLORIDE 100 11/28/2023    CHLORIDE 98 11/27/2023    CO2 24 11/28/2023    CO2 23 11/27/2023    BUN 71.5 (H) 11/28/2023    BUN 66.6 (H) 11/27/2023    CR 2.93 (H) 11/28/2023    CR 2.83 (H) 11/27/2023     (H) 11/28/2023     (H) 11/27/2023     COAGS:   Lab Results   Component Value Date    PTT 36 11/28/2023    INR 1.28 (H) 11/28/2023     POC: No results found for: \"BGM\", \"HCG\", \"HCGS\"  HEPATIC:   Lab Results   Component Value Date    ALBUMIN 3.4 (L) 11/28/2023    PROTTOTAL 6.6 11/28/2023    ALT 55 11/28/2023    AST 30 11/28/2023    ALKPHOS 348 (H) 11/28/2023    BILITOTAL 4.9 (H) 11/28/2023    ANNABEL 27 " 11/27/2023     OTHER:   Lab Results   Component Value Date    LACT 1.9 11/27/2023    YG 9.4 11/28/2023    MAG 2.8 (H) 11/27/2023    LIPASE 43 11/27/2023       ECG results from 11/27/23   EKG 12-lead, tracing only     Value    Systolic Blood Pressure     Diastolic Blood Pressure     Ventricular Rate 78    Atrial Rate 78    NY Interval 158    QRS Duration 120        QTc 510    P Axis 83    R AXIS -64    T Axis 31    Interpretation ECG      Sinus rhythm with occasional Premature ventricular complexes  Left axis deviation  Right bundle branch block  Minimal voltage criteria for LVH, may be normal variant ( R in aVL )  Abnormal ECG  No previous ECGs available            Anesthesia Plan    ASA Status:  3    NPO Status:  NPO Appropriate    Anesthesia Type: General.     - Airway: ETT              Consents    Anesthesia Plan(s) and associated risks, benefits, and realistic alternatives discussed. Questions answered and patient/representative(s) expressed understanding.     - Discussed:     - Discussed with:  Patient      - Extended Intubation/Ventilatory Support Discussed: No.      - Patient is DNR/DNI Status: No     Use of blood products discussed: No .     Postoperative Care    Pain management: IV analgesics, Oral pain medications.   PONV prophylaxis: Ondansetron (or other 5HT-3), Dexamethasone or Solumedrol     Comments:               Rachel Luu MD    I have reviewed the pertinent notes and labs in the chart from the past 30 days and (re)examined the patient.  Any updates or changes from those notes are reflected in this note.          # Hypoalbuminemia: Lowest albumin = 3.4 g/dL at 11/28/2023  6:32 AM, will monitor as appropriate  # Acute Kidney Injury, unspecified: based on a >150% or 0.3 mg/dL increase in last creatinine compared to past 90 day average, will monitor renal function  # Coagulation Defect: INR = 1.28 (Ref range: 0.85 - 1.15) and/or PTT = 36 Seconds (Ref range: 22 - 38 Seconds), will monitor for  bleeding

## 2023-11-28 NOTE — ANESTHESIA CARE TRANSFER NOTE
Patient: Emigdio Hannah    Procedure: Procedure(s):  ENDOSCOPIC ULTRASOUND,EGD,bile duct drainage, stent placement       Diagnosis: Jaundice [R17]  Elevated bilirubin [R17]  Diagnosis Additional Information: No value filed.    Anesthesia Type:   General     Note:    Oropharynx: oropharynx clear of all foreign objects and spontaneously breathing  Level of Consciousness: awake  Oxygen Supplementation: face mask  Level of Supplemental Oxygen (L/min / FiO2): 4  Independent Airway: airway patency satisfactory and stable  Dentition: dentition unchanged  Vital Signs Stable: post-procedure vital signs reviewed and stable  Report to RN Given: handoff report given  Patient transferred to: PACU    Handoff Report: Identifed the Patient, Identified the Reponsible Provider, Reviewed the pertinent medical history, Discussed the surgical course, Reviewed Intra-OP anesthesia mangement and issues during anesthesia, Set expectations for post-procedure period and Allowed opportunity for questions and acknowledgement of understanding      Vitals:  Vitals Value Taken Time   /72 11/28/23 1622   Temp     Pulse 76 11/28/23 1627   Resp 12 11/28/23 1627   SpO2 100 % 11/28/23 1627   Vitals shown include unfiled device data.    Electronically Signed By: GABRIEL Bertrand CRNA  November 28, 2023  4:28 PM

## 2023-11-28 NOTE — OP NOTE
Upper EUS 11/28/2023  2:25 PM [address] [place] [address] [phone]     Endoscopy Department  _______________________________________________________________________________  Patient Name: Emigdio Hannah            Procedure Date: 11/28/2023 2:25 PM  MRN: [id number]                       Account Number: [id number]  YOB: 1926              Admit Type: Inpatient  Age: 89                               Room:  OR   Gender: Male                          Note Status: Finalized  Attending MD: TIERRA RYAN MD,      Pause for the Cause: time out performed  Total Sedation Time:                    _______________________________________________________________________________     Procedure:             Upper EUS  Indications:           Common bile duct dilation (acquired) seen on CT scan,                         Pancreatic cyst on CT scan, 89 y/o with new painless                         jaundice due to a pancreatic cystic lesion (likely                         IPMN) was planned to have an outpatient ERCP for                         biliary decompression but admitted due to ERIC from                         likely urinary retention.  Providers:             TIERRA RYAN MD, Jennifer Vanderheyden  Referring MD:            Requesting Provider:   BARBARA RICH  Medicines:             General Anesthesia, Levaquin 500 mg IV  Complications:         No immediate complications. Estimated blood loss:                         Minimal.  _______________________________________________________________________________  Procedure:             Pre-Anesthesia Assessment:                         - Prior to the procedure, a History and Physical was                         performed, and patient medications and allergies were                         reviewed. The patient is unable to give consent                         secondary to the patient's altered mental  status. The                         risks and benefits of the procedure and the sedation                         options and risks were discussed with the patient's                         spouse. All questions were answered and informed                         consent was obtained. Patient identification and                         proposed procedure were verified by the physician, the                         nurse, the anesthesiologist and the anesthetist in the                         procedure room. Mental Status Examination: alert but                         confused. Respiratory Examination: clear to                         auscultation. CV Examination: normal. Prophylactic                         Antibiotics: The patient requires prophylactic                         antibiotics EUS guided Gall bladder drainage. Prior                         Anticoagulants: The patient has taken no anticoagulant                         or antiplatelet agents. ASA Grade Assessment: III - A                         patient with severe systemic disease. After reviewing                          the risks and benefits, the patient was deemed in                         satisfactory condition to undergo the procedure. The                         anesthesia plan was to use general anesthesia.                         Immediately prior to administration of medications,                         the patient was re-assessed for adequacy to receive                         sedatives. The heart rate, respiratory rate, oxygen                         saturations, blood pressure, adequacy of pulmonary                         ventilation, and response to care were monitored                         throughout the procedure. The physical status of the                         patient was re-assessed after the procedure.                         After obtaining informed consent, the endoscope was                         passed under direct vision.  Throughout the procedure,                         the patient's blood pressure, pulse, and oxygen                         saturations were monitored continuously. The                         Endosonoscope was introduced through the mouth, and                         advanced to the second part of duodenum. The was                         introduced through the mouth, and advanced to the                         second part of duodenum. The upper EUS was                         accomplished without difficulty. The patient tolerated                         the procedure well.                                                                                   Findings:       ENDOSCOPIC FINDING: :       The examined esophagus was endoscopically normal.       Scattered moderate inflammation characterized by erosions, erythema and       friability was found in the entire examined stomach.       The examined duodenum had some extrinsic compression at the level of the       duodenal bulb and second portion but was otherwise endoscopically normal.       ENDOSONOGRAPHIC FINDING: :       An anechoic lesion suggestive of a cyst was identified in the pancreatic       head. It communicates with the pancreatic duct. The lesion measured 52       mm by 35 mm in maximal cross-sectional diameter. There were a few       compartments thinly septated. The outer wall of the lesion was thin.       There was no associated mass. There was no internal debris within the       fluid-filled cavity. Lesion caused extrinsic compression of the common       bile duct       The pancreatic parenchyma was atrophic but normal otherwise. Unable to       visualize pancreatic duct in the head due to cystic lesion. The       pancreatic duct measured 2.4 mm in the body adjacent to the cystic       lesion and 3.9 mm in the tail of the pancreas.       No lymph nodes were seen in the upper abdomen and mediastinum.       No masses were seen in the visualized  portions of the liver.       The bile duct measured 8 mm in maximal diameter at the bifurcation. The       gallbladder was distended containing layering echogenic material       consistent with sludge.       The decision was made to create a cholecystogastrostomy using the AXIOS       stent system to drain the biliary tree. Attempted to perform drainage       transduodenally, but difficult scope position to maintain and ultimately       ended up in the distal gastric antrum. Once an appropriate position was       identified, the common wall between the antrum and gallbladder was       interrogated utilizing color Doppler imaging to identify interposed       vessels. The stomach and gallbladder were punctured under       endosonographic guidance with the 19 gauge needle. The gallbladder was       injected with full strength ionic contrast and a cholangiogram was       obtained under fluoroscopy confirming communication of the gallbladder       with the biliary tree. Visiglide wire was inserted into the gallbladder       under fluoroscopic guidance and allowed to coil several times. The AXIOS       stent and electrocautery device was introduced through the working       channel and advanced over the guidewire. Current was applied to the       cautery tip and then used to increase the diameter of the stoma. The       AXIOS device was advanced into the gallbladder over the wire, and a 10 x       10 mm AXIOS stent was placed with the flanges in close approximation to       the walls of the gallbladder and the stomach through the       cholecystogastrostomy . The stent was successfully placed. A TTS dilator       was passed through the scope. Dilation with a 10 mm balloon dilator was       performed of the Axios stent under fluoroscopic guidance. A 7 Fr x 3 cm       double pigtail Zimmon stent was placed coaxially with the Axios stent to       aid with patency. A large amount of dark bile and sludge material        immediatley drained out of the gallbladder and was suctioned out.                                                                                   Impression:            - Normal esophagus.                         - Erosive gastritis.                         - Extrinsic compression in the duodenal bulb and D2                         from both the pancreatic cystic lesion and gallbladder                         - ~5 cm pancreatic cyst with a few thin septations and                         PD communication seen. No mural nodule seen.                         Appearance consistent with an IPMN vs pseudocyst. Less                         likely pseudocyst as the rest of the pancreatic                         parenchyma appeared relatively normal without chronic                         pancreatitis changes. Cyst not aspirated as management                         would not change and therefore not worth the                         procedural risk.                         - Given potential challenges with ERCP and need for                         definative biliary decompression in this 89 year old                         patient, elected to proceed directly to EUS biliary                         drainage via the gallbladder which was massively                         distended compared to the common bile duct.                         - Successful EUS guided gallbladder drainage with 10                         mm Axios stent placed across cholecystogastrostomy                         tract. Coaxially placed 7 Fr x 3 cm double pigtail                         stent placed. Large amount of dark bile suctioned out.                         Gallbladder communicating with the rest of the biliary                         tree  Recommendation:        - [facility].                         - NPO for 3 hours. The advance to a full liquid diet                         today if not having abdominal  pain. Can then advance                         to a regular diet as tolerated tomorrow                         - Avoid anticoagulation for next 72 hours                         - Stent to be left in placed indefinately                         - Panc-bili team to continue following                                                                                     Sekou Cortez MD

## 2023-11-29 NOTE — PROGRESS NOTES
[facility]    Medicine Progress Note - Hospitalist Service, GOLD TEAM 1    Date of Admission:  11/27/2023    Assessment & Plan   Emigdio Hannah is a 89 year old male with no past medical history, who was admitted to [facility] 11/27/2023 for further evaluation of generalized weakness and jaundice in the setting of recently found pancreatic mass.    Changes Today:  - Tamsulosin for urinary retention  - Remove indwelling torres cath   - Straight cath PRN  - Advance diet as tolerated  - Discontinue antibiotics  - WOCN and Derm involvement     Bilateral LE Edema (R>L)  Acute occlusive superficial thrombus of left gastrocnemius vein  Patient presented with bilateral lower extremity edema and scattered wounds through LE. US on admission showed acute superficial clot of left gastrocnemius vein as noted. Initially started on heparin drip, but this was discontinued and Vitamin K administered due to procedure above. Discussed case with hematology who reports that there are disagreements regarding whether or not gastrocnemius is a deep or superficial vein, but likely no indication for treatment in this patient (especially in his age and likely higher risk at falls). Etiology of bilateral LE edema unclear. Appears ED was initially concerned for cellulitis and patient was started on antibiotics. No history or concern for heart failure at this time. Patient unable to provide reliable history--question if some memory loss and potential patient spends a lot of time on his feet leading to dependant edema. WOCN saw patient and recommended derm consultation. Will discontinue antibiotics at this time and watch patient off of them.   - WOCN following  - Consult Derm in AM  - Discontinue antibiotics  - Should repeat LE US in 1-2 weeks, or sooner if indicated, to evaluate for clot propagation   - Consider ECHO if worsening of concerns for heart failure  - Per GI, hold off on anticoagulation for 3 days  post-op    ERIC, improving  Urinary Retention  Likely BPH  Multiple imaging studies showed distended bladder and patient reported difficulty initiating urination likely consistent with BPH. On admission, creatinine elevated to 2.93 from baseline of ~1. Suspect likely multifactorial related to poor oral intake and history/imaging consistent with BPH. A torres catheter was placed on admission and patient received IV fluids with noted improving creatinine. Electing to start tamsulosin for probable BPH.   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 Work-up at that time showed total bilirubin of 18.1, alk phos of 1.074 and CT scan showed large cystic mass along CBD. Presented to [facility] 11/27 with weakness, and bilateral lower extremity edema. On admission, repeat imaging showed stable pancreatic mass and actually improved bilirubin to 3.6 and alk phos to 454.  Also noted to have leukocytosis to 14.3. Ceftriaxone and vancomycin were initiated due to concern for intraabdominal infection. GI was consulted and patient underwent ERCP 11/28, which showed findings consistent with IPMN vs pseudocyst. Given age, elected to proceed with draining without sampling. Stent was placed and will be left in indefinitely. Patient tolerated procedure well and was able to advance diet appropriately. GI subsequently signed off. Overall, hemodynamically stable and appropriate for admission to the floor.   - Advance diet as tolerated  - Follow CMP  - Follow CBC    Hard of hearing  ? Confusion  Patient very hard of hearing despite hearing aid in place. Difficult to communicate and determine how much of situation patient understands.  His responses are intermittently appropriate and inappropriate.    - Noted          Diet: Advance Diet as Tolerated: Clear Liquid Diet    DVT Prophylaxis: VTE Prophylaxis contraindicated due to cellulitis and recent procedure  Sandoval Catheter: PRESENT, indication: Retention  Lines: None     Cardiac Monitoring: None  Code Status: Full Code      Clinically Significant Risk Factors           # Hypercalcemia: corrected calcium is >10.1, will monitor as appropriate    # Hypoalbuminemia: Lowest albumin = 3.2 g/dL at 11/28/2023 12:34 PM, will monitor as appropriate  # Coagulation Defect: INR = 1.31 (Ref range: 0.85 - 1.15) and/or PTT = 36 Seconds (Ref range: 22 - 38 Seconds), will monitor for bleeding                      Disposition Plan     Expected Discharge Date: 11/29/2023                  The patient's care was discussed with the Attending Physician, Dr. Wong, Bedside Nurse, Patient, and Patient's Family.    Luci Altamirano PA-C  Hospitalist Service, GOLD TEAM [address] [place] [address]  Securely message with Vocera (more info)  Text page via [facility] Paging/Directory   See signed in provider for up to date coverage information  ______________________________________________________________________    Interval History   Patient reports he is feeling well. Just had bed bath prior to interview, brushing hair. Asking when he can go home.     Physical Exam   Vital Signs: Temp: 98.3  F (36.8  C) Temp src: Oral BP: 131/66 Pulse: 84   Resp: 18 SpO2: 94 % O2 Device: None (Room air) Oxygen Delivery: 6 LPM  Weight: 0 lbs 0 oz    General Appearance: Comfortable, nontoxic appearing male seen laying in bed. Brushing his hair.   Eyes: PERRLA. Mild conjunctival icterus.  HEENT: Atraumatic.  Respiratory: Breathing comfortably on room air.  Lung sounds clear to auscultation throughout.  No wheezes, rhonchi, rales.  Cardiovascular: RRR.  No murmurs, rubs, gallops. Biltaral LE edema, right appears  worse than left.   GI: Bowel sounds present throughout.  Abdomen soft, nontender.  Lymph/Hematologic: No bruising on exposed skin.  Skin: Very mild jaundice present. Bilateral LE edema present as noted above, scattered weeping wounds   Musculoskeletal: Moving all extremities spontaneously.  Neurologic: Cranial nerves II through XII grossly intact.  Psychiatric: Mood appropriate.    Medical Decision Making     60 MINUTES SPENT BY ME on the date of service doing chart review, history, exam, documentation & further activities per the note.      Data   Imaging results reviewed over the past 24 hrs:   Recent Results (from the past 24 hour(s))   XR Surgery FREDDIE G/T 5 Min Fluoro w Stills    Narrative    This exam was marked as non-reportable because it will not be read by a   radiologist or a [place] non-radiologist provider.           Recent Labs   Lab 11/29/23  0638 11/29/23  0623 11/28/23  1234 11/28/23  0632 11/27/23  1533   WBC  --  12.6* 12.4* 12.0* 14.3*   HGB  --  8.7* 9.8* 10.4* 11.1*   MCV  --  93 94 95 94   PLT  --  159 181 181 211   INR  --   --  1.31* 1.28*  --     142 139 136 136   POTASSIUM 4.1 4.2 4.3 4.8 5.0   CHLORIDE 106 106 102 100 98   CO2 27 26 26 24 23   BUN 44.1* 44.0* 63.6* 71.5* 66.6*   CR 1.34* 1.37* 2.36* 2.93* 2.83*   ANIONGAP 8 10 11 12 15   YG 8.6 8.5 9.1 9.4 9.7*   * 109* 111* 126* 144*   ALBUMIN 2.6* 2.6* 3.2* 3.4* 4.0   PROTTOTAL 5.3* 4.8* 6.3* 6.6 8.0   BILITOTAL 3.6* 3.7* 4.6* 4.9* 6.4*   ALKPHOS 261* 261* 325* 348* 454*   ALT 35 44 49 55 73*   AST 29 27 28 30 44   LIPASE  --   --  37  --  43

## 2023-11-29 NOTE — CONSULTS
[facility]  WOC Nurse Inpatient Assessment     Consulted for: RLE      Patient History (according to H&P provider note(s) 11/27/23:      Emigdio Hannah is a 89 year old male with history of recent onset of jaundice and and presents for evaluation of generalized weakness and found to have ERIC and bilateral lower extremity pitting edema of unclear etiology. Will give small fluid challenge for ERIC. Ordered RUQ US to evaluate for PVT as etiology of LE edema.     # bilateral LE pitting edema  No evidence of HF on exam. C/f possible PVT and will obtain US to rule this out.   - RUQ as above  - consider TTE   - consider WOC consult given scattered wounds on bl LE    *Now s/p ENDOSCOPIC ULTRASOUND,EGD,bile duct drainage, stent placement  on 11/28 with / Diego.    Assessment:      Areas visualized during today's visit: Lower extremities     Wound location: RLE     11/29: Lateral right lower leg   11/29: Medial right lower leg    11/29: anterior RLE    Last photo: 11/29/23  Wound due to: Unknown Etiology  Wound history/plan of care: see HPI, pt given lasix and per RN LE are much less edematous and swelling has decreased to bilateral LE. LLE looks similar but much less severe and never had any drainage. Could be vasculitis, would need derm consult to verify and recommend additional treatment  Wound base: 100 % superficial scab, scattered with weeping from 2 pinpoint areas to medial RLE     Palpation of the wound bed: normal      Drainage: copious     Description of drainage: serous     Measurements (length x width x depth, in cm): pinpoint open areas previously leaking- now dried scabs     Tunneling: N/A     Undermining: N/A  Periwound skin: Intact, Dry/scaly, Edematous, and Scar tissue      Color: normal and consistent with surrounding tissue and purple, slightly jaundiced      Temperature: normal   Odor: none  Pain: moderate and during dressing change, tender  Pain interventions  "prior to dressing change: slow and gentle cares   Treatment goal: Heal , Maintain (prevention of deterioration), and edema control  STATUS: initial assessment  Supplies ordered: ordered edemawear, supplies stored on unit, discussed with RN, and discussed with patient       Treatment Plan:     Bilateral lower leg wound(s): Daily and PRN when soiled change ABD pads to RLE   Spray arun cleanse and protect spray onto dry wipes or hands and use to cleanse BLE and allow to air dry- ensure between toes dries  Apply small edemawear (158043) white netting with blue stripe. DO NOT THRO AWAY- these can be washed and hung to dry and reused for 4-6 months  See WOC note for further edemawear instruction  Apply ABD pad and kerlix over RLE if draining- secure with tape. No need for this step if not draining  If pt c/o pain due to edemawear please remove    EdemaWear stockings: Use and Care    Rationale for use:   Decreases edema by improving lymphatic and venous function    Safe and gentle compression  Enhances wound healing  Protects skin    General:   EdemaWear can be worn 24/7, but should be removed at least daily for skin inspection and cares    In order to be effective, EdemaWear should DIRECTLY contact the skin as much as possible -- the mesh weave promotes lymphatic drainage when it is pressed into the skin  Choose appropriate size EdemaWear based on leg (or arm) circumference - see table for guidelines  EdemaWear LITE is only for especially fragile or painful skin  Cleanse and moisturize any intact or scaly skin before applying EdemaWear  Ok to apply additional compression over the EdemaWear (ie Lymph wraps)    Application:   Apply the EdemaWear from base of toes to knee, or above knee if tolerated and it is a long stocking  Create a wide 3\" cuff at the top if needed to prevent rolling down  Only trim the stocking if it is excessively long; do NOT cut in half; can often fold over excess length onto foot    When wounds are " "present:  Wound dressings that directly treat the wound bed can be applied under the EdemaWear  Any additional cover dressings (dry gauze, ABD pads, Kerlix, etc) should be applied ON TOP of the stockings whenever feasible   Stockings may get soiled with drainage and will need to be washed; ensure an extra clean, dry pair always available  May need two people to apply the stocking - bunch up stocking and pull against each other, lifting over wounds    Care:  When stockings are soiled, DO NOT THROW AWAY, hand wash with mild soap, rinse, hang dry  Replace approximately every 4 to 6 months        EdemaWear size Max circumference Stripe color PS # # stockings per pack   Small 18\"  (45cm) navy [id number] 2   Medium 30\"  (75cm) yellow 715205 1   Large 46\"  (115cm) red 004428 1   X Large 60\"  (150cm) aqua 823262 1   Small LITE 24\"  (60cm) purple 631379 2   Medium LITE 36\"  (90cm) orange [id number] 1   Cheapest place to order more stockings is on [employer] website ()    Orders: Written    RECOMMEND PRIMARY TEAM ORDER: Dermatology consult  Education provided: plan of care and wound progress  Discussed plan of care with: Patient and Nurse  WOC nurse follow-up plan: weekly  Notify WOC if wound(s) deteriorate.  Nursing to notify the Provider(s) and re-consult the WOC Nurse if new skin concern.    DATA:     Current support surface: Standard  Standard gel/foam mattress (IsoFlex, Atmos air, etc)  Containment of urine/stool: Incontinent pad in bed  BMI: There is no height or weight on file to calculate BMI.   Active diet order: Orders Placed This Encounter      Regular Diet Adult     Output: I/O last 3 completed shifts:  In: 2034.58 [P.O.:200; I.V.:1834.58]  Out: 2800 [Urine:2800]     Labs:   Recent Labs   Lab 11/29/23  0638 11/29/23  0623 11/28/23  1234   ALBUMIN 2.6* 2.6* 3.2*   HGB  --  8.7* 9.8*   INR  --   --  1.31*   WBC  --  12.6* 12.4*     Pressure injury risk assessment:   Sensory Perception: 3-->slightly " limited  Moisture: 3-->occasionally moist  Activity: 3-->walks occasionally  Mobility: 3-->slightly limited  Nutrition: 2-->probably inadequate  Friction and Shear: 2-->potential problem  Virgil Score: 16    Elsy Ramos RN CWOCN  Pager no longer is use, please contact through [employer] group: [facility] Nurse [place]  Dept. Office Number: [phone]

## 2023-11-29 NOTE — PLAN OF CARE
/70 (BP Location: Right arm)   Pulse 82   Temp 97.5  F (36.4  C) (Oral)   Resp 18   SpO2 95%     AVSS on RA, afebrile. Pt denied pain/nausea/vomiting/SOB or diarrhea. Pt alert an oriented x 3, assist of 1 or 2 with using walker and gait belt, call light using appropriate.  Right PIV with LR at 75 ml/hr continuously. Sandoval in place, patent with good UOP, no BM overnight. Pt was admitted with wallet, car key and house keys, those were all counted and stored in an envelop, verified with 2 Rns, family will come and bring them back. BLE redness and painful, compression stocking off, elevated with pillows, left buttock redness and blanchable, mepilex dressing applied for protection. Stool sample still in need to rule out C. Diff. AM lab pending. Continue with current plan.     Problem: Adult Inpatient Plan of Care  Goal: Plan of Care Review  Description: The Plan of Care Review/Shift note should be completed every shift.  The Outcome Evaluation is a brief statement about your assessment that the patient is improving, declining, or no change.  This information will be displayed automatically on your shift  note.  Outcome: Progressing  Flowsheets (Taken 11/29/2023 0533)  Plan of Care Reviewed With: patient   Goal Outcome Evaluation:      Plan of Care Reviewed With: patient

## 2023-11-29 NOTE — PROGRESS NOTES
Gastroenterology Follow up Note         Assessment and Plan:   Emigdio Hannah is a 89 year old male with a history of new onset jaundice found during his routine outpatient appointment for which he was scheduled for an outpatient ERCP, who now presents with generalized weakness and was found to have ERIC. GI was consulted for inpatient ERCP given jaundice.     Repeat imaging with persistent pancreatic cystic lesion, CBD, PD dilation and GB distention. Decision was made to proceed with biliary decompression without sampling given low clinical utility give patient's age.    Now s/p EUS, intervention noted below    Biliary drainage via the gallbladder which was massively distended compared to the common bile duct.  - Successful EUS guided gallbladder drainage with 10mm Axios stent placed across cholecystogastrostomy tract. Coaxially placed 7 Fr x 3 cm double pigtail stent placed. Large amount of dark bile suctioned out. Gallbladder communicating with the rest of the biliary tree     Patient is doing well clinically post procedure today. Tbili continued to down trend. He remains hemodynamically stable and actually does look better than he did yesterday.    Given patient's age, no plans for repeat procedure, Stent to be left in place indefinitely.          Recommendations:   --ADAT  --Avoid anticoagulation for next 72 hours  -- Stent to be left in placed indefinately   --Supportive care per primary team      It has been a pleasure to participate in the care of this patient.  Patient discussed with GI staff, Dr. Doty.  Please do not hesitate to contact the GI service with any questions or concerns.     Kee Cortez MD  Gastroenterology Fellow  Pager [phone]         Subjective/Objective:     No reported new or worsening GI related symptoms today        Physical Exam:   VS:  /60 (BP Location: Right arm, Cuff Size: Adult Small)   Pulse 72   Temp 97.5  F (36.4  C) (Oral)   Resp 18   SpO2 95%     Wt:   Wt  Readings from Last 2 Encounters:   11/21/23 57.6 kg (127 lb)   11/16/23 56.2 kg (124 lb)      Constitutional: No acute distress  Eyes: Conjunctiva anicteric  HEENT: Mucus membranes moist  CV: No tachycardia  Respiratory: Unlabored   Abd: Soft, Nondistended, non tender, no rebound or guarding   Skin: warm, perfused, no jaundice  Neuro: No FND         Laboratory:   BMP  Recent Labs   Lab 11/29/23  0638 11/29/23  0623 11/28/23  1234 11/28/23  0632    142 139 136   POTASSIUM 4.1 4.2 4.3 4.8   CHLORIDE 106 106 102 100   YG 8.6 8.5 9.1 9.4   CO2 27 26 26 24   BUN 44.1* 44.0* 63.6* 71.5*   CR 1.34* 1.37* 2.36* 2.93*   * 109* 111* 126*     CBC  Recent Labs   Lab 11/29/23  0623 11/28/23  1234 11/28/23  0632 11/27/23  1533   WBC 12.6* 12.4* 12.0* 14.3*   RBC 2.83* 3.16* 3.37* 3.55*   HGB 8.7* 9.8* 10.4* 11.1*   HCT 26.3* 29.8* 31.9* 33.5*   MCV 93 94 95 94   MCH 30.7 31.0 30.9 31.3   MCHC 33.1 32.9 32.6 33.1   RDW 17.2* 17.3* 17.4* 17.6*    181 181 211     INR  Recent Labs   Lab 11/28/23  1234 11/28/23  0632   INR 1.31* 1.28*     LFTs  Recent Labs   Lab 11/29/23  0638 11/29/23  0623 11/28/23  1234 11/28/23  0632   ALKPHOS 261* 261* 325* 348*   AST 29 27 28 30   ALT 35 44 49 55   BILITOTAL 3.6* 3.7* 4.6* 4.9*   PROTTOTAL 5.3* 4.8* 6.3* 6.6   ALBUMIN 2.6* 2.6* 3.2* 3.4*      PANC  Recent Labs   Lab 11/28/23  1234 11/27/23  1533   LIPASE 37 43            Imaging/Procedures/Studies:   No results found for this or any previous visit.  ERCP 11/28    Impression:            - Normal esophagus.                         - Erosive gastritis.                         - Extrinsic compression in the duodenal bulb and D2                         from both the pancreatic cystic lesion and gallbladder                         - ~5 cm pancreatic cyst with a few thin septations and                         PD communication seen. No mural nodule seen.                         Appearance consistent with an IPMN vs pseudocyst.  Less                         likely pseudocyst as the rest of the pancreatic                         parenchyma appeared relatively normal without chronic                         pancreatitis changes. Cyst not aspirated as management                         would not change and therefore not worth the                         procedural risk.                         - Given potential challenges with ERCP and need for                         definative biliary decompression in this 89 year old                         patient, elected to proceed directly to EUS biliary                         drainage via the gallbladder which was massively                         distended compared to the common bile duct.                         - Successful EUS guided gallbladder drainage with 10                         mm Axios stent placed across cholecystogastrostomy                         tract. Coaxially placed 7 Fr x 3 cm double pigtail                         stent placed. Large amount of dark bile suctioned out.                         Gallbladder communicating with the rest of the biliary                         tree

## 2023-11-29 NOTE — DISCHARGE INSTRUCTIONS
"Bilateral lower leg wound(s): Daily and PRN when soiled change ABD pads to RLE   Spray arun cleanse and protect spray onto dry wipes or hands and use to cleanse BLE and allow to air dry- ensure between toes dries  Apply small edemawear (202425) white netting with blue stripe. DO NOT THRO AWAY- these can be washed and hung to dry and reused for 4-6 months  See WOC note for further edemawear instruction  Apply ABD pad and kerlix over RLE if draining- secure with tape. No need for this step if not draining  If pt c/o pain due to edemawear please remove    EdemaWear stockings: Use and Care    Rationale for use:   Decreases edema by improving lymphatic and venous function    Safe and gentle compression  Enhances wound healing  Protects skin    General:   EdemaWear can be worn 24/7, but should be removed at least daily for skin inspection and cares    In order to be effective, EdemaWear should DIRECTLY contact the skin as much as possible -- the mesh weave promotes lymphatic drainage when it is pressed into the skin  Choose appropriate size EdemaWear based on leg (or arm) circumference - see table for guidelines  EdemaWear LITE is only for especially fragile or painful skin  Cleanse and moisturize any intact or scaly skin before applying EdemaWear  Ok to apply additional compression over the EdemaWear (ie Lymph wraps)    Application:   Apply the EdemaWear from base of toes to knee, or above knee if tolerated and it is a long stocking  Create a wide 3\" cuff at the top if needed to prevent rolling down  Only trim the stocking if it is excessively long; do NOT cut in half; can often fold over excess length onto foot    When wounds are present:  Wound dressings that directly treat the wound bed can be applied under the EdemaWear  Any additional cover dressings (dry gauze, ABD pads, Kerlix, etc) should be applied ON TOP of the stockings whenever feasible   Stockings may get soiled with drainage and will need to be washed; " "ensure an extra clean, dry pair always available  May need two people to apply the stocking - bunch up stocking and pull against each other, lifting over wounds    Care:  When stockings are soiled, DO NOT THROW AWAY, hand wash with mild soap, rinse, hang dry  Replace approximately every 4 to 6 months        EdemaWear size Max circumference Stripe color PS # # stockings per pack   Small 18\"  (45cm) navy 834800 2   Medium 30\"  (75cm) yellow 847433 1   Large 46\"  (115cm) red 863282 1   X Large 60\"  (150cm) aqua 229924 1   Small LITE 24\"  (60cm) purple 119830 2   Medium LITE 36\"  (90cm) orange 813307 1   Cheapest place to order more stockings is on [employer] website ()    "

## 2023-11-29 NOTE — PROGRESS NOTES
Patient admitted to:  Room 4  Admitted from: PACU   Arrived by:0020  Reason for admission: Jaundice, ERCP, bile duct drainage, stent placement, generalized weakness.   Patient accompanied by: self  Belongings: wallet with cash, car key, house keys (all put in a sealed envelope), pt's clothes, left side hearing aid, upper level denture.    Teaching: room orientation, call light using, visit policy, nursing care routine ....  Skin double check completed by: Lucero Rivas RN and Irina Medina RN

## 2023-11-30 NOTE — PROGRESS NOTES
"   11/30/23 1109   Appointment Info   Signing Clinician's Name / Credentials (PT) DICK Singh   Student Supervision Therapy services provided with the co-signing licensed therapist guiding and directing the services, and providing the skilled judgement and assessment throughout the session;Direct Patient Contact Provided   Rehab Comments (PT) DUTCH, has hearing aid   Living Environment   People in Home child(nakita), adult;spouse  (son and daighter)   Current Living Arrangements house   Home Accessibility stairs to enter home;stairs within home   Number of Stairs, Main Entrance 6   Stair Railings, Main Entrance railing on right side (ascending)   Number of Stairs, Within Home, Primary eight   Stair Railings, Within Home, Primary railing on right side (ascending)   Transportation Anticipated other (see comments)  (Pt reports he drives his daughter to the bus stop every morning; follow up if family can provide)   Living Environment Comments Pt reports he using stairs in home frequently, but if he's \"not walking good\" he can avoid them   Self-Care   Usual Activity Tolerance good   Current Activity Tolerance moderate   Regular Exercise Yes   Activity/Exercise Type walking;strength training   Exercise Amount/Frequency 3-5 times/wk   Equipment Currently Used at Home cane, straight  (Pt reports he has cane but does not use it)   Fall history within last six months yes   Number of times patient has fallen within last six months 1  (Pt reports he fell forward going down sloped concrete)   Activity/Exercise/Self-Care Comment Pt reports independence with mobility initially, later states his daughter helps him up the stairs. Pt reports he was still driving, going to [employer] in the mornings to meet up with friends.   General Information   Onset of Illness/Injury or Date of Surgery 11/27/23   Referring Physician Sekou Cortez MD   Patient/Family Therapy Goals Statement (PT) Return home [facility]   Pertinent History of Current " "Problem (include personal factors and/or comorbidities that impact the POC) Per EMR: \"Emigdio Hannah is a 89 year old male with history of recent onset of jaundice and and presents for evaluation of generalized weakness and found to have ERIC and bilateral lower extremity pitting edema of unclear etiology. \"   Existing Precautions/Restrictions fall   General Observations Activity: ADULT ICU Early Mobility Protocol   Cognition   Affect/Mental Status (Cognition) WFL   Orientation Status (Cognition) oriented x 3  (Pt reported \"[facility]\" when asked what building we are in. Pt orienting to time, self, and situation)   Cognitive Status Comments   (Pt reports he does not have to manage his own medications because he currently does not take any medications)   Pain Assessment   Patient Currently in Pain No   Integumentary/Edema   Integumentary/Edema Comments R LE rash, BLE covered with serran wrap over dressing   Posture    Posture Protracted shoulders;Forward head position;Kyphosis  (cervical flexion)   Range of Motion (ROM)   ROM Comment   (LLE AROM WFL, decreased RLE knee extension and hip flexion AROM 2/2 pain. Pt reported pain over RLE wounds and thigh area. UE ROM not formally tested but able to lift arms up to walker and down to chair during STS.)   Strength (Manual Muscle Testing)   Strength Comments BUE strength WFL, LLE >3/5, RLE: hip flexion/knee extension ~2+/5. Uses UEs to lift LLE during assessment of hip flexion. Significant posterior trunk lean with B knee extension.   Bed Mobility   Comment, (Bed Mobility) min Ax2 supine>seated EOB for bringing RLE over EOB and at shoulders/trunk   Transfers   Comment, (Transfers) minAx1-2 STS   Gait/Stairs (Locomotion)   [place] Level (Gait) minimum assist (75% patient effort)   Assistive Device (Gait) walker, standard   Distance in Feet (Gait) 3   Comment, (Gait/Stairs) Pt ambulated 3 ft with FWW, min A. Pt demonstrates significant trunk flexion/downward gaze. Ambulates " with decreased foot clearance and decreased step length   Balance   Balance Comments Fair/poor sitting balance, requires min-mod A to avoid posterior LOB. Standing balance poor, pt requires min A x 2 in standing to avoid posterior LOB.   Clinical Impression   Criteria for Skilled Therapeutic Intervention Yes, treatment indicated   PT Diagnosis (PT) Impaired mobility   Influenced by the following impairments decreased LE strength, impaired balance   Functional limitations due to impairments transfers, bed mobility, ambulation, stairs   Clinical Presentation (PT Evaluation Complexity) evolving   Clinical Presentation Rationale medical status, clinical judgement   Clinical Decision Making (Complexity) moderate complexity   Planned Therapy Interventions (PT) balance training;bed mobility training;gait training;home exercise program;patient/family education;stair training;strengthening;transfer training   Risk & Benefits of therapy have been explained evaluation/treatment results reviewed;care plan/treatment goals reviewed;risks/benefits reviewed;current/potential barriers reviewed;participants voiced agreement with care plan;participants included;patient   PT Total Evaluation Time   PT Eval, Moderate Complexity Minutes (71770) 18   Physical Therapy Goals   PT Frequency 6x/week   PT Predicted Duration/Target Date for Goal Attainment 12/15/23   PT Goals Stairs;Gait;Bed Mobility;Transfers   PT: Bed Mobility Independent;Supine to/from sit   PT: Transfers Independent;Sit to/from stand   PT: Gait 150 feet;Modified independent;Straight cane   PT: Stairs 8 stairs;Rail on right;Modified independent   PT Discharge Planning   PT Plan amb with FWW, LE strengthening   PT Discharge Recommendation (DC Rec) Transitional Care Facility   PT Rationale for DC Rec Pt demonstrating impaired mobility below baseline and requiring Ax1 with walker for amb and Ax2 for bed mobility. Will need to confirm baseline with family members. Will continue  to assess and update discharge recommendation throughout hospitalization.   PT Brief overview of current status Ax1 with GB and FWW for amb, Ax2 for bed mobility   PT Equipment Needed at Discharge walker, standard   Total Session Time   Timed Code Treatment Minutes 10   Total Session Time (sum of timed and untimed services) 28

## 2023-11-30 NOTE — PROVIDER NOTIFICATION
11/30/23 1200   Call Information   Date of Call 11/30/23   Time of Call 1139   Name of person requesting the team Rabia   Title of person requesting team RN   RRT Arrival time 1145   Time RRT ended 1200   Reason for call   Type of RRT Adult   Primary reason for call Cardiovascular   Cardiovascular HR less than 40   Was patient transferred from the ED, ICU, or PACU within last 24 hours prior to RRT call? No   SBAR   Situation HR 30-40, asymptomatic   Notable History/Conditions Recent surgery   Assessment A+O, NAD, HR 80s with frequent PVCs   Interventions ECG;Labs   Patient Outcome   Patient Outcome Stabilized on unit   RRT Team   Attending/Primary/Covering Physician Gold 1   Date Attending Physician notified 11/30/23   Time Attending Physician notified 1139   Physician(s) Marvin Restrepo   Lead RN Scarlet Camarillo   Post RRT Intervention Assessment   Post RRT Assessment Stable/Improved   Date Follow Up Done 11/30/23   Time Follow Up Done 5706

## 2023-11-30 NOTE — PROVIDER NOTIFICATION
MD Wells paged via [employer]: Pt on bladder scan/straight cath protocol since torres removal. Last bladder scan was 397, straight cath output was 600.

## 2023-11-30 NOTE — PROGRESS NOTES
[facility] Inpatient Dermatology Progress Note    Assessment and Plan:  1. 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      Recommendations:   - Continue:               -Mixing a thick emollient (such as Aquahpor or Vaseline) with Triamcinolone 0.1% ointment and apply a thick layer on both lower extremities under occlusion (with Saran wrap) and repeat every 12 hours         Thank you for the dermatology consultation. Please do not hesitate to contact the dermatology resident/faculty on call for any additional questions or concerns. We will continue to follow.      Patient seen and evaluated with attending physician, Dr. Vinnie Tapia MD  Dermatology Resident, PGY-3  [facility]  Pager: [phone]    I have seen and examined this patient and agree with the assessment and plan as documented in the resident's note.    Jamin Birmingham MD  Dermatology Attending      Dermatology Problem List:  1. Eczema craquele   - Current Tx: Emollient mixed with Triamcinolone 0.1% ointment q12 hours under occlusion     Date of Admission: Nov 27, 2023   Encounter Date: 11/30/2023      Interval history:    Current etiology for lower extremity edema unclear, prior to admission patient on his feet all day.   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     Medications:  Current Facility-Administered Medications   Medication     [Held by provider] aspirin EC tablet 81 mg     cholecalciferol (VITAMIN D3) tablet 10 mcg     lidocaine (LMX4) cream     lidocaine 1 % 0.1-1 mL     naloxone (NARCAN) injection 0.2 mg     naloxone (NARCAN)  injection 0.2 mg     naloxone (NARCAN) injection 0.4 mg     naloxone (NARCAN) injection 0.4 mg     ondansetron (ZOFRAN ODT) ODT tab 4 mg    Or     ondansetron (ZOFRAN) injection 4 mg     polyethylene glycol (MIRALAX) Packet 17 g     senna-docusate (SENOKOT-S/PERICOLACE) 8.6-50 MG per tablet 1 tablet    Or     senna-docusate (SENOKOT-S/PERICOLACE) 8.6-50 MG per tablet 2 tablet     sodium chloride (PF) 0.9% PF flush 3 mL     sodium chloride (PF) 0.9% PF flush 3 mL     tamsulosin (FLOMAX) capsule 0.4 mg     triamcinolone (KENALOG) 0.1 % ointment        Review of Systems:  -As per HPI    Physical exam:  /62 (BP Location: Left arm)   Pulse 93   Temp 98.9  F (37.2  C) (Oral)   Resp 27   SpO2 98%   GEN:This is a well developed, well-nourished female in no acute distress, in a pleasant mood., unsure if patient is completed oriented   SKIN: Focused examination of the  was performed.  -Cash skin type: I  - Well demarcated bright red erythematous plaque on the R > L with appreciable edema and yellow undertone of the skin above the plaques.   - Non tender to palpation.     Laboratory:  Results for orders placed or performed during the hospital encounter of 11/27/23 (from the past 24 hour(s))   Comprehensive metabolic panel   Result Value Ref Range    Sodium 143 135 - 145 mmol/L    Potassium 3.5 3.4 - 5.3 mmol/L    Carbon Dioxide (CO2) 27 22 - 29 mmol/L    Anion Gap 8 7 - 15 mmol/L    Urea Nitrogen 31.5 (H) 8.0 - 23.0 mg/dL    Creatinine 0.95 0.67 - 1.17 mg/dL    GFR Estimate 73 >60 mL/min/1.73m2    Calcium 8.2 8.2 - 9.6 mg/dL    Chloride 108 (H) 98 - 107 mmol/L    Glucose 113 (H) 70 - 99 mg/dL    Alkaline Phosphatase 234 (H) 40 - 150 U/L    AST 26 0 - 45 U/L    ALT 31 0 - 70 U/L    Protein Total 4.9 (L) 6.4 - 8.3 g/dL    Albumin 2.3 (L) 3.5 - 5.2 g/dL    Bilirubin Total 3.1 (H) <=1.2 mg/dL   Platelet count   Result Value Ref Range    Platelet Count 154 150 - 450 10e3/uL   CBC with platelets   Result Value Ref  Range    WBC Count 11.1 (H) 4.0 - 11.0 10e3/uL    RBC Count 2.82 (L) 4.40 - 5.90 10e6/uL    Hemoglobin 8.8 (L) 13.3 - 17.7 g/dL    Hematocrit 27.4 (L) 40.0 - 53.0 %    MCV 97 78 - 100 fL    MCH 31.2 26.5 - 33.0 pg    MCHC 32.1 31.5 - 36.5 g/dL    RDW 17.2 (H) 10.0 - 15.0 %    Platelet Count 154 150 - 450 10e3/uL   Hemoglobin A1c   Result Value Ref Range    Hemoglobin A1C 6.1 (H) <5.7 %   Extra Tube    Narrative    The following orders were created for panel order Extra Tube.  Procedure                               Abnormality         Status                     ---------                               -----------         ------                     Extra Green Top (Lithium...[[id number]]                                                   Please view results for these tests on the individual orders.   Troponin T, High Sensitivity   Result Value Ref Range    Troponin T, High Sensitivity 36 (H) <=22 ng/L   Basic metabolic panel   Result Value Ref Range    Sodium 144 135 - 145 mmol/L    Potassium 3.7 3.4 - 5.3 mmol/L    Chloride 107 98 - 107 mmol/L    Carbon Dioxide (CO2) 26 22 - 29 mmol/L    Anion Gap 11 7 - 15 mmol/L    Urea Nitrogen 29.5 (H) 8.0 - 23.0 mg/dL    Creatinine 0.95 0.67 - 1.17 mg/dL    GFR Estimate 73 >60 mL/min/1.73m2    Calcium 8.5 8.2 - 9.6 mg/dL    Glucose 236 (H) 70 - 99 mg/dL   Magnesium   Result Value Ref Range    Magnesium 1.9 1.7 - 2.3 mg/dL   Phosphorus   Result Value Ref Range    Phosphorus 1.9 (L) 2.5 - 4.5 mg/dL   EKG 12-lead, complete   Result Value Ref Range    Systolic Blood Pressure  mmHg    Diastolic Blood Pressure  mmHg    Ventricular Rate 81 BPM    Atrial Rate 81 BPM    SC Interval 148 ms    QRS Duration 142 ms     ms    QTc 501 ms    P Axis 68 degrees    R AXIS -64 degrees    T Axis 82 degrees    Interpretation ECG       Sinus rhythm  Right bundle branch block  Left anterior fascicular block  ** Bifascicular block **  Left ventricular hypertrophy with repolarization abnormality ( R  in aVL )  Cannot rule out Septal infarct , age undetermined  Abnormal ECG  When compared with ECG of 28-NOV-2023 14:14, (unconfirmed)  Premature ventricular complexes are no longer Present  QRS duration has increased  Minimal criteria for Septal infarct are now Present     Troponin T, High Sensitivity   Result Value Ref Range    Troponin T, High Sensitivity 35 (H) <=22 ng/L       Staff Involved:  Resident/Staff     Corrina Tapia MD  Dermatology Resident, PGY-3  [facility]  Pager: [phone]

## 2023-11-30 NOTE — PLAN OF CARE
Goal Outcome Evaluation:      Plan of Care Reviewed With: patient, child    Blood pressure 134/62, pulse 93, temperature 98.9  F (37.2  C), temperature source Oral, resp. rate 17, SpO2 98%.    Neuro: A&Ox4.   Cardiac: SR with BBB and PVCs, patient had an episode of Bradycardia, resolved after a short time, patient asymptomatic, now on tele  Respiratory: Sating >92% on RA.  GI/: Patient with persistent retention- Sandoval placed with large urine output. BM X2.   Diet/appetite: Tolerating regular diet- fair intake.   Activity:  Assist of 1, walker and belt, up to chair  Pain: At acceptable level on current regimen.   Skin: No new deficits noted.  LDA's: PIV x1.    Plan: Possible discharge home when medically ready. Continue with POC. Notify primary team with changes.

## 2023-11-30 NOTE — PLAN OF CARE
/62   Pulse 86   Temp 98.1  F (36.7  C) (Oral)   Resp 18   SpO2 93%     AVSS on RA. Pt denies pain. Sandoval was removed on previous shift, pt unable to void overnight. Bladder scan q4hrs until volume was ~390, straight cath output 600ml at 0130. Pt should be re-scanned at 0730. Pt repositioned q2hrs overnight. Pt reporting discomfort with edema wraps, requested removal in the evening. Per derm's note, pt's legs covered with aquaphor and wrapped with saran wrap. Pt reporting relief with this intervention. Will need Kenalog cream ordered. Labs stable, no replacements needed. Continue POC.    Problem: Adult Inpatient Plan of Care  Goal: Optimal Comfort and Wellbeing  Outcome: Progressing     Problem: Fall Injury Risk  Goal: Absence of Fall and Fall-Related Injury  Outcome: Progressing

## 2023-11-30 NOTE — PLAN OF CARE
/59 (BP Location: Right arm, Cuff Size: Adult Small)   Pulse 90   Temp 98  F (36.7  C) (Oral)   Resp 18   SpO2 92%   Patient calm and cooperative. Patient had echo performed today as well as WOC came to dress both legs, note of how to change dressing are in WOC's note. Patient had torres removed and is on the intermittent cath/bladder scan orders. Patient did not have a BM during shift, unable to send fecal sample to lab for C Diff testing. Patient reported no pain during shift and was alert and oriented x 3. Currently running continues LR at 75mL/hr. Patient unable to get out of bed for shower reported thigh pain as barrier. Provider notified, PT/OT consult was placed. Recommend continue plan of care.   Problem: Adult Inpatient Plan of Care  Goal: Absence of Hospital-Acquired Illness or Injury  Intervention: Identify and Manage Fall Risk  Recent Flowsheet Documentation  Taken 11/29/2023 1530 by Daniel Zurita RN  Safety Promotion/Fall Prevention: safety round/check completed  Taken 11/29/2023 1130 by Daniel Zurita RN  Safety Promotion/Fall Prevention: safety round/check completed  Taken 11/29/2023 0800 by Daniel Zurita RN  Safety Promotion/Fall Prevention: safety round/check completed  Intervention: Prevent Skin Injury  Recent Flowsheet Documentation  Taken 11/29/2023 1530 by Daniel Zurita RN  Body Position: position changed independently  Taken 11/29/2023 1130 by Daniel Zurita RN  Body Position: position changed independently  Taken 11/29/2023 0800 by Daniel Zurita RN  Body Position: position changed independently  Intervention: Prevent and Manage VTE (Venous Thromboembolism) Risk  Recent Flowsheet Documentation  Taken 11/29/2023 1530 by Daniel Zurita RN  VTE Prevention/Management: SCDs (sequential compression devices) off  Taken 11/29/2023 1130 by Daniel Zurita RN  VTE Prevention/Management: SCDs (sequential compression devices) off  Taken 11/29/2023 0800 by  Zurita, Daniel A, RN  VTE Prevention/Management: SCDs (sequential compression devices) off     Problem: Fall Injury Risk  Goal: Absence of Fall and Fall-Related Injury  Intervention: Identify and Manage Contributors  Recent Flowsheet Documentation  Taken 11/29/2023 1530 by Daniel Zurita RN  Medication Review/Management: medications reviewed  Taken 11/29/2023 1130 by Daniel Zurita RN  Medication Review/Management: medications reviewed  Intervention: Promote Injury-Free Environment  Recent Flowsheet Documentation  Taken 11/29/2023 1530 by Daniel Zurita RN  Safety Promotion/Fall Prevention: safety round/check completed  Taken 11/29/2023 1130 by Daniel Zurita RN  Safety Promotion/Fall Prevention: safety round/check completed  Taken 11/29/2023 0800 by Daniel Zurita RN  Safety Promotion/Fall Prevention: safety round/check completed   Goal Outcome Evaluation:

## 2023-11-30 NOTE — PROVIDER NOTIFICATION
Patient bradycardic at low 40's, provider notified. Vitals stable, denies SOB or chest pain, labs and ECG ordered.

## 2023-11-30 NOTE — CONSULTS
[facility] Inpatient Consult Dermatology Note    Impression/Plan:  1. Linear erythematous streaks with notable fissuring and bleeding in the setting of severe xerosis on bilateral lower extremities most consistent with severe eczema craquele.     Recommendations:   - Recommend starting   -Mix an thick emollient (such as Aquahpor or Vaseline) with Triamcinolone 0.1% ointment and apply a thick layer on both lower extremities under occlusion (with Saran wrap) and repeat every 12 hours     Thank you for the dermatology consultation. Please do not hesitate to contact the dermatology resident/faculty on call for any additional questions or concerns. We will continue to follow.     Patient seen and evaluated with attending physician, Dr. Vinnie Tapia MD  Dermatology Resident, PGY-3  [facility]  Pager: [phone]    I have seen and examined this patient and agree with the assessment and plan as documented in the resident's note.    Jamin Birmingham MD  Dermatology Attending        Dermatology Problem List:  1.Eczema craquele   - Current Tx: Emollient mixed with Triamcinolone 0.1% ointment q12 hours under occlusion     Date of Admission: Nov 27, 2023   Encounter Date: 11/29/2023    Reason for Consultation:   Scattered wounds on bl LE     History of Present Illness:  Mr. Emigdio Hannah is a 89 year old male with no relevant pmhx who was admitted 11/27/23 for recent onset of jaundice and evaluation of generalized weakness, found to have an ERIC as well as bilateral lower extremity pitting edema . Dermatology was consulted for lower extremity rash.     Past Medical History:   Patient Active Problem List   Diagnosis     CARDIOVASCULAR SCREENING; LDL GOAL LESS THAN 160     Advanced directives, counseling/discussion     Squamous cell carcinoma in situ     Hearing loss     Generalized weakness     Past Medical History:   Diagnosis Date     AR (allergic rhinitis)      Hearing loss       Squamous cell carcinoma in situ 8/12    right upper back -- excised 9/12     Past Surgical History:   Procedure Laterality Date     CATARACT IOL, RT/LT  5-04 & 6-09    Both eyes     HERNIA REPAIR, INGUINAL RT/LT  4-93    Rt.     SURGICAL PATHOLOGY EXAM  5-9-6    Rt         Social History:  Patient reports that he has never smoked. He has never used smokeless tobacco. He reports current alcohol use. He reports that he does not use drugs.    Family History:  History reviewed. No pertinent family history.    Medications:  Current Facility-Administered Medications   Medication     [Held by provider] aspirin EC tablet 81 mg     cholecalciferol (VITAMIN D3) tablet 10 mcg     lidocaine (LMX4) cream     lidocaine 1 % 0.1-1 mL     naloxone (NARCAN) injection 0.2 mg     naloxone (NARCAN) injection 0.2 mg     naloxone (NARCAN) injection 0.4 mg     naloxone (NARCAN) injection 0.4 mg     ondansetron (ZOFRAN ODT) ODT tab 4 mg    Or     ondansetron (ZOFRAN) injection 4 mg     senna-docusate (SENOKOT-S/PERICOLACE) 8.6-50 MG per tablet 1 tablet    Or     senna-docusate (SENOKOT-S/PERICOLACE) 8.6-50 MG per tablet 2 tablet     sodium chloride (PF) 0.9% PF flush 3 mL     sodium chloride (PF) 0.9% PF flush 3 mL     tamsulosin (FLOMAX) capsule 0.4 mg          No Known Allergies      Review of Systems:  -As per HPI      Physical exam:  Vitals: /59 (BP Location: Right arm, Cuff Size: Adult Small)   Pulse 90   Temp 98  F (36.7  C) (Oral)   Resp 18   SpO2 92%   - Diffuse scale and and xerosis with erythematous purpuric streaks and fissuring noted on bilateral extremities   -No other lesions of concern on areas examined.     Laboratory:  Results for orders placed or performed during the hospital encounter of 11/27/23 (from the past 24 hour(s))   Vancomycin level   Result Value Ref Range    Vancomycin 6.4   ug/mL   Extra Tube    Narrative    The following orders were created for panel order Extra Tube.  Procedure                                Abnormality         Status                     ---------                               -----------         ------                     Extra Green Top (Lithium...[[id number]]                      Final result               Extra Purple Top Tube[[id number]]                            Final result                 Please view results for these tests on the individual orders.   Extra Purple Top Tube   Result Value Ref Range    Hold Specimen [facility]    CBC with platelets   Result Value Ref Range    WBC Count 12.6 (H) 4.0 - 11.0 10e3/uL    RBC Count 2.83 (L) 4.40 - 5.90 10e6/uL    Hemoglobin 8.7 (L) 13.3 - 17.7 g/dL    Hematocrit 26.3 (L) 40.0 - 53.0 %    MCV 93 78 - 100 fL    MCH 30.7 26.5 - 33.0 pg    MCHC 33.1 31.5 - 36.5 g/dL    RDW 17.2 (H) 10.0 - 15.0 %    Platelet Count 159 150 - 450 10e3/uL   Comprehensive metabolic panel   Result Value Ref Range    Sodium 142 135 - 145 mmol/L    Potassium 4.2 3.4 - 5.3 mmol/L    Carbon Dioxide (CO2) 26 22 - 29 mmol/L    Anion Gap 10 7 - 15 mmol/L    Urea Nitrogen 44.0 (H) 8.0 - 23.0 mg/dL    Creatinine 1.37 (H) 0.67 - 1.17 mg/dL    GFR Estimate 47 (L) >60 mL/min/1.73m2    Calcium 8.5 8.2 - 9.6 mg/dL    Chloride 106 98 - 107 mmol/L    Glucose 109 (H) 70 - 99 mg/dL    Alkaline Phosphatase 261 (H) 40 - 150 U/L    AST 27 0 - 45 U/L    ALT 44 0 - 70 U/L    Protein Total 4.8 (L) 6.4 - 8.3 g/dL    Albumin 2.6 (L) 3.5 - 5.2 g/dL    Bilirubin Total 3.7 (H) <=1.2 mg/dL   Extra Green Top (Lithium Heparin) Tube   Result Value Ref Range    Hold Specimen [facility]    Comprehensive metabolic panel   Result Value Ref Range    Sodium 141 135 - 145 mmol/L    Potassium 4.1 3.4 - 5.3 mmol/L    Carbon Dioxide (CO2) 27 22 - 29 mmol/L    Anion Gap 8 7 - 15 mmol/L    Urea Nitrogen 44.1 (H) 8.0 - 23.0 mg/dL    Creatinine 1.34 (H) 0.67 - 1.17 mg/dL    GFR Estimate 48 (L) >60 mL/min/1.73m2    Calcium 8.6 8.2 - 9.6 mg/dL    Chloride 106 98 - 107 mmol/L    Glucose 110 (H) 70 - 99 mg/dL    Alkaline Phosphatase  261 (H) 40 - 150 U/L    AST 29 0 - 45 U/L    ALT 35 0 - 70 U/L    Protein Total 5.3 (L) 6.4 - 8.3 g/dL    Albumin 2.6 (L) 3.5 - 5.2 g/dL    Bilirubin Total 3.6 (H) <=1.2 mg/dL   Lactic Acid STAT   Result Value Ref Range    Lactic Acid 1.3 0.7 - 2.0 mmol/L   Echo Complete   Result Value Ref Range    Biplane LVEF 42%     [facility]    [id number]  [facility]  [id number]  [id number]^AMELIA^AARON^ANTHONY     [facility],[place]  Echocardiography Laboratory  [address] [place] [address]     Name: RUBINA WAGNER  MRN: [id number]  : 1926  Study Date: 2023 01:20 PM  Age: 89 yrs  Gender: Male  Patient Location: [facility]  Reason For Study: Edema  Ordering Physician: AARON CISNEROS  Performed By: James Owusu RDCS     BSA: 1.7 m2  Height: 67 in  Weight: 127 lb  BP: 126/60 mmHg  ______________________________________________________________________________  Procedure  Complete Portable Echo Adult. Contrast Optison. Patient was given 6 ml mixture  of 3 ml Optison and 6 ml saline. 3 ml wasted.  ______________________________________________________________________________  Interpretation Summary  Biplane LVEF is 42%.  Mild diffuse hypokinesis is present.  Right ventricular function, chamber size, wall motion, and thickness are  normal.  Mild aortic insufficiency is present.  Moderate to severe tricuspid insufficiency is present.  The right ventricular systolic pressure is 53mmHg above the right atrial  pressure.Mild mitral insufficiency is present.  No pericardial effusion is present.  IVC diameter <2.1 cm collapsing >50% with sniff suggests a normal RA pressure  of 3 mmHg.     No prior study available for comparison.     ______________________________________________________________________________  Left Ventricle  Biplane LVEF is 42%. Left ventricular size is normal. Mild concentric wall  thickening consistent with left ventricular hypertrophy is present. Left  ventricular diastolic  function is indeterminate. Mild diffuse hypokinesis is  present.     Right Ventricle  Right ventricular function, chamber size, wall motion, and thickness are  normal.     Atria  The left atrium appears normal. Moderate right atrial enlargement is present.     Mitral Valve  Mitral leaflet thickness is increased . Mild mitral insufficiency is present.     Aortic Valve  The aortic valve is tricuspid. Mild aortic insufficiency is present.     Tricuspid Valve  Moderate to severe tricuspid insufficiency is present. The right ventricular  systolic pressure is 53mmHg above the right atrial pressure.     Pulmonic Valve  The pulmonic valve cannot be assessed. On Doppler interrogation, there is no  significant stenosis or regurgitation.     Vessels  The aorta root is normal. The pulmonary artery and bifurcation cannot be  assessed. IVC diameter <2.1 cm collapsing >50% with sniff suggests a normal RA  pressure of 3 mmHg.     Pericardium  No pericardial effusion is present.     Compared to Previous Study  There is no prior study for direct comparison.     Attestation  I have personally viewed the imaging and agree with the interpretation and  report as documented by the fellow, Desire SILVA, and/or edited by me.  ______________________________________________________________________________  MMode/2D Measurements & Calculations  IVSd: 1.1 cm  LVIDd: 5.0 cm  LVIDs: 3.7 cm  LVPWd: 1.4 cm  FS: 24.8 %  LV mass(C)d: 244.9 grams  LV mass(C)dI: 146.9 grams/m2  Ao root diam: 3.3 cm  asc Aorta Diam: 3.5 cm  LVOT diam: 2.0 cm  LVOT area: 3.0 cm2  Ao root diam index Ht(cm/m): 1.9  Ao root diam index BSA (cm/m2): 2.0  Asc Ao diam index BSA (cm/m2): 2.1  Asc Ao diam index Ht(cm/m): 2.0  RV Base: 4.8 cm     RWT: 0.55  TAPSE: 3.2 cm     Doppler Measurements & Calculations  MV E max romero: 102.0 cm/sec  MV A max romero: 81.2 cm/sec  MV E/A: 1.3  MV dec slope: 644.9 cm/sec2  MV dec time: 0.16 sec  Ao V2 max: 172.0 cm/sec  Ao max P.8 mmHg  Ao V2  mean: 107.0 cm/sec  Ao mean P.0 mmHg  Ao V2 VTI: 29.6 cm  CHEVY(I,D): 2.2 cm2  CHEVY(V,D): 2.1 cm2  AI P1/2t: 412.2 msec  LV V1 max P.8 mmHg  LV V1 max: 120.0 cm/sec  LV V1 VTI: 21.7 cm  SV(LVOT): 65.3 ml  SI(LVOT): 39.2 ml/m2  PA acc time: 0.06 sec  TR max rg: 403.5 cm/sec  TR max P.1 mmHg  AV Rg Ratio (DI): 0.70  CHEVY Index (cm2/m2): 1.3  E/E' av.0  Lateral E/e': 6.6     Medial E/e': 21.3  RV S Rg: 16.1 cm/sec     ______________________________________________________________________________  Report approved by: Anu Encinas 2023 04:27 PM         MRSA MSSA PCR, Nasal Swab    Specimen: Nare, Right; Swab   Result Value Ref Range    MRSA Target DNA Negative Negative    SA Target DNA Positive     Narrative    The [employer]  Xpert SA Nasal Complete assay performed in the Gene[employer]pert  Dx System is a qualitative in vitro diagnostic test designed for rapid detection of Staphylococcus aureus (SA) and methicillin-resistant Staphylococcus aureus (MRSA) from nasal swabs in patients at risk for nasal colonization. The test utilizes automated real-time polymerase chain reaction (PCR) to detect MRSA/SA DNA. The Xpert SA Nasal Complete assay is intended to aid in the prevention and control of MRSA/SA infections in healthcare settings. The assay is not intended to diagnose, guide or monitor treatment for MRSA/SA infections, or provide results of susceptibility to methicillin. A negative result does not preclude MRSA/SA nasal colonization.        Dr. Birmingham staffed the patient.    Staff Involved:  Corrina Tapia MD  Dermatology Resident, PGY-3  [facility]  Pager: [phone]

## 2023-11-30 NOTE — PROGRESS NOTES
[facility]    Medicine Progress Note - Hospitalist Service, GOLD TEAM 1    Date of Admission:  11/27/2023    Assessment & Plan   Emigdio Hannah is a 89 year old male with no past medical history, who was admitted to [facility] 11/27/2023 for further evaluation of generalized weakness and jaundice in the setting of recently found pancreatic mass.    Changes Today:  - WOCN and Derm involvement  - PT/OT     Bilateral LE Edema (R>L)  Severe Eczema craquele  Acute occlusive superficial thrombus of left gastrocnemius vein  Patient presented with bilateral lower extremity edema and scattered wounds throughout LE. US on admission showed acute superficial clot of left gastrocnemius vein. Initially started on heparin drip, but this was discontinued and Vitamin K administered due to procedure below. Discussed case with hematology who reports that there are disagreements regarding whether or not gastrocnemius is a deep or superficial vein, but likely no indication for treatment in this patient (especially in his age and likely higher risk at falls thus bleeds). Etiology of bilateral LE edema unclear. Appears ED was initially concerned for cellulitis and patient was started on antibiotics. No history or concern for heart failure at this time. Patient unable to provide reliable history--question if some memory loss and potential patient spends a lot of time on his feet leading to dependant edema. WOCN saw patient and recommended derm consultation. Antibiotics were discontinued and patient has done well off of them. Derm reviewed images and stated it appeared to be severe eczema craquele and patient and started on steroid emolient.  Will discontinue antibiotics at this time and watch patient off of them.   - WOCN following  - Derm following   - Should repeat LE US in 1-2 weeks, or sooner if indicated, to evaluate for clot propagation   - Consider ECHO if worsening of concerns for heart  "failure  - Per GI, hold off on anticoagulation for 3 days post-op    ERIC, resolved  Urinary Retention  Likely BPH  Multiple imaging studies showed distended bladder and patient reported difficulty initiating urination likely consistent with BPH. On admission, creatinine elevated to 2.93 from baseline of ~1. Suspect likely multifactorial related to poor oral intake and history/imaging consistent with BPH. A torres catheter was placed on admission and patient received IV fluids with noted improving creatinine. Electing to start tamsulosin for probable BPH. Trial of straight cathing was performed, and patient unable to void without assistance. Indwelling torres catheter was placed again, and will likely need on discharge. Discussed with family and they feel they can care for him at home with this. He will need follow up with urology on discharge.   - Continue indwelling torres  - Continue tamsulosin  - Follow creatinine  - Patient will need follow up with urology on discharge    Bradycardia  Troponinemia, stable  Hypomagnesemia  Patient with bradycardia event prior to PT/OT, following \"large bowel movement.\" HR dropped to 40's. Stat EKG ordered and rapid called. EKG without significant change from prior. Labs showed hypomagnesia and elevated troponin. Repeat troponin with no sig change--likely baseline in age group/situation. Unclear exact etiology behind bradycardia, question if vasovagal event. Recovered to prior heart rate without issue.   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 Work-up at that time showed total bilirubin of 18.1, alk phos of 1.074 and CT scan showed large cystic mass along CBD. Presented to [facility] 11/27 with weakness, and bilateral lower extremity edema. On admission, repeat imaging showed stable " pancreatic mass and actually improved bilirubin to 3.6 and alk phos to 454.  Also noted to have leukocytosis to 14.3. Ceftriaxone and vancomycin were initiated due to concern for intraabdominal infection. GI was consulted and patient underwent ERCP 11/28, which showed findings consistent with IPMN vs pseudocyst. Given age, elected to proceed with draining without sampling. Stent was placed and will be left in indefinitely. Patient tolerated procedure well and was able to advance diet appropriately. GI subsequently signed off. Overall, hemodynamically stable and appropriate for admission to the floor.   - Advance diet as tolerated  - Follow CMP  - Follow CBC    Deconditioning  Per family, patient up walking with cane PTA. Drives, does yard work and requires very minimal assistance. On admission, requiring heavy assist of two to use commode.  - PT/OT consult     Hard of hearing  ? Confusion  Patient very hard of hearing despite hearing aid in place. Difficult to communicate and determine how much of situation patient understands. His responses are intermittently appropriate and inappropriate.    - Recommend using pocket talker          Diet: Regular Diet Adult    DVT Prophylaxis: VTE Prophylaxis contraindicated due to cellulitis and recent procedure  Sandoval Catheter: Not present  Lines: None     Cardiac Monitoring: None  Code Status: Full Code      Clinically Significant Risk Factors           # Hypercalcemia: corrected calcium is >10.1, will monitor as appropriate    # Hypoalbuminemia: Lowest albumin = 2.3 g/dL at 11/30/2023  4:51 AM, will monitor as appropriate    # Coagulation Defect: INR = 1.31 (Ref range: 0.85 - 1.15) and/or PTT = 36 Seconds (Ref range: 22 - 38 Seconds), will monitor for bleeding                      Disposition Plan     Expected Discharge Date: 11/30/2023                  The patient's care was discussed with the Attending Physician, Dr. Wong, Bedside Nurse, Patient, and Patient's  Family.    Luci Altamirano PA-C  Hospitalist Service, GOLD TEAM 1  [facility]  Securely message with [employer] (more info)  Text page via [employer] Paging/Directory   See signed in provider for up to date coverage information  ______________________________________________________________________    Interval History   Patient reports he is feeling well. Very comfortable. No complaints.      Physical Exam   Vital Signs: Temp: 98.1  F (36.7  C) Temp src: Oral BP: 131/67 Pulse: 86   Resp: 18 SpO2: 94 % O2 Device: None (Room air)    Weight: 0 lbs 0 oz    General Appearance: Comfortable, nontoxic appearing male seen laying in bed. Brushing his hair.   Eyes: PERRLA. Mild conjunctival icterus.  HEENT: Atraumatic.  Respiratory: Breathing comfortably on room air.  Lung sounds clear to auscultation throughout.  No wheezes, rhonchi, rales.  Cardiovascular: RRR.  No murmurs, rubs, gallops. Biltaral LE edema, right appears worse than left.   GI: Bowel sounds present throughout.  Abdomen soft, nontender.  Lymph/Hematologic: No bruising on exposed skin.  Skin: Very mild jaundice present. Bilateral LE edema present as noted above, scattered weeping wounds   Musculoskeletal: Moving all extremities spontaneously.  Neurologic: Cranial nerves II through XII grossly intact.  Psychiatric: Mood appropriate.    Medical Decision Making     60 MINUTES SPENT BY ME on the date of service doing chart review, history, exam, documentation & further activities per the note.      Data   Imaging results reviewed over the past 24 hrs:   Recent Results (from the past 24 hour(s))   Echo Complete   Result Value    Biplane LVEF 42%    Narrative    [id number]  [facility]  [id number]  [id number]^AMELIA^LUCI^ANTHONY     [facility],[place]  Echocardiography Laboratory  [address] [place] [id number]     Name: RUBINA WAGNER  MRN: [id number]  : 1926  Study Date: 2023 01:20  PM  Age: 89 yrs  Gender: Male  Patient Location: [facility]  Reason For Study: Edema  Ordering Physician: AARON CISNEROS  Performed By: James Owusu RDCS     BSA: 1.7 m2  Height: 67 in  Weight: 127 lb  BP: 126/60 mmHg  ______________________________________________________________________________  Procedure  Complete Portable Echo Adult. Contrast Optison. Patient was given 6 ml mixture  of 3 ml Optison and 6 ml saline. 3 ml wasted.  ______________________________________________________________________________  Interpretation Summary  Biplane LVEF is 42%.  Mild diffuse hypokinesis is present.  Right ventricular function, chamber size, wall motion, and thickness are  normal.  Mild aortic insufficiency is present.  Moderate to severe tricuspid insufficiency is present.  The right ventricular systolic pressure is 53mmHg above the right atrial  pressure.Mild mitral insufficiency is present.  No pericardial effusion is present.  IVC diameter <2.1 cm collapsing >50% with sniff suggests a normal RA pressure  of 3 mmHg.     No prior study available for comparison.     ______________________________________________________________________________  Left Ventricle  Biplane LVEF is 42%. Left ventricular size is normal. Mild concentric wall  thickening consistent with left ventricular hypertrophy is present. Left  ventricular diastolic function is indeterminate. Mild diffuse hypokinesis is  present.     Right Ventricle  Right ventricular function, chamber size, wall motion, and thickness are  normal.     Atria  The left atrium appears normal. Moderate right atrial enlargement is present.     Mitral Valve  Mitral leaflet thickness is increased . Mild mitral insufficiency is present.     Aortic Valve  The aortic valve is tricuspid. Mild aortic insufficiency is present.     Tricuspid Valve  Moderate to severe tricuspid insufficiency is present. The right ventricular  systolic pressure is 53mmHg above the right atrial pressure.      Pulmonic Valve  The pulmonic valve cannot be assessed. On Doppler interrogation, there is no  significant stenosis or regurgitation.     Vessels  The aorta root is normal. The pulmonary artery and bifurcation cannot be  assessed. IVC diameter <2.1 cm collapsing >50% with sniff suggests a normal RA  pressure of 3 mmHg.     Pericardium  No pericardial effusion is present.     Compared to Previous Study  There is no prior study for direct comparison.     Attestation  I have personally viewed the imaging and agree with the interpretation and  report as documented by the fellow, Desire SILVA, and/or edited by me.  ______________________________________________________________________________  MMode/2D Measurements & Calculations  IVSd: 1.1 cm  LVIDd: 5.0 cm  LVIDs: 3.7 cm  LVPWd: 1.4 cm  FS: 24.8 %  LV mass(C)d: 244.9 grams  LV mass(C)dI: 146.9 grams/m2  Ao root diam: 3.3 cm  asc Aorta Diam: 3.5 cm  LVOT diam: 2.0 cm  LVOT area: 3.0 cm2  Ao root diam index Ht(cm/m): 1.9  Ao root diam index BSA (cm/m2): 2.0  Asc Ao diam index BSA (cm/m2): 2.1  Asc Ao diam index Ht(cm/m): 2.0  RV Base: 4.8 cm     RWT: 0.55  TAPSE: 3.2 cm     Doppler Measurements & Calculations  MV E max rg: 102.0 cm/sec  MV A max rg: 81.2 cm/sec  MV E/A: 1.3  MV dec slope: 644.9 cm/sec2  MV dec time: 0.16 sec  Ao V2 max: 172.0 cm/sec  Ao max P.8 mmHg  Ao V2 mean: 107.0 cm/sec  Ao mean P.0 mmHg  Ao V2 VTI: 29.6 cm  CHEVY(I,D): 2.2 cm2  CHEVY(V,D): 2.1 cm2  AI P1/2t: 412.2 msec  LV V1 max P.8 mmHg  LV V1 max: 120.0 cm/sec  LV V1 VTI: 21.7 cm  SV(LVOT): 65.3 ml  SI(LVOT): 39.2 ml/m2  PA acc time: 0.06 sec  TR max rg: 403.5 cm/sec  TR max P.1 mmHg  AV Rg Ratio (DI): 0.70  CHEVY Index (cm2/m2): 1.3  E/E' av.0  Lateral E/e': 6.6     Medial E/e': 21.3  RV S Rg: 16.1 cm/sec     ______________________________________________________________________________  Report approved by: Anu Encinas 2023 04:27 PM           [facility]  11/30/23  0607 11/30/23  0451 11/29/23  0638 11/29/23  0623 11/28/23  1234 11/28/23  0632 11/27/23  1533   WBC  --   --   --  12.6* 12.4* 12.0* 14.3*   HGB  --   --   --  8.7* 9.8* 10.4* 11.1*   MCV  --   --   --  93 94 95 94     --   --  159 181 181 211   INR  --   --   --   --  1.31* 1.28*  --    NA  --  143 141 142 139 136 136   POTASSIUM  --  3.5 4.1 4.2 4.3 4.8 5.0   CHLORIDE  --  108* 106 106 102 100 98   CO2  --  27 27 26 26 24 23   BUN  --  31.5* 44.1* 44.0* 63.6* 71.5* 66.6*   CR  --  0.95 1.34* 1.37* 2.36* 2.93* 2.83*   ANIONGAP  --  8 8 10 11 12 15   YG  --  8.2 8.6 8.5 9.1 9.4 9.7*   GLC  --  113* 110* 109* 111* 126* 144*   ALBUMIN  --  2.3* 2.6* 2.6* 3.2* 3.4* 4.0   PROTTOTAL  --  4.9* 5.3* 4.8* 6.3* 6.6 8.0   BILITOTAL  --  3.1* 3.6* 3.7* 4.6* 4.9* 6.4*   ALKPHOS  --  234* 261* 261* 325* 348* 454*   ALT  --  31 35 44 49 55 73*   AST  --  26 29 27 28 30 44   LIPASE  --   --   --   --  37  --  43

## 2023-11-30 NOTE — CODE/RAPID RESPONSE
Rapid Response Team Note    Assessment   In assessment a rapid response was called on Emigdio Hannah due to  bradycardia . Unclear precipitant - asymptomatic. May be vasovagal as he was getting ready to work with PT, would also consider electrolyte abnormality. Less likely ACS based on presentation.     Plan   - Stat EKG, BMP, Mg, Phos, trop  - Telemetry  - The Internal Medicine primary team was  at bedside and in agreement with the plan.  - Disposition: The patient will remain on the current unit. We will continue to monitor this patient closely.  - Reassessment and plan follow-up will be performed by the primary team    Corrina Sherman PA-C  [facility] Job Code Contact #[phone]  [facility] Paging/Directory    Hospital Course   Brief Summary of events leading to rapid response:   Patient getting ready to work with PT but still laying in bed. Noted to be bradycardic to 40s with frequent PVCs. Asymptomatic without chest pain. Does feel the PVCs but not distressing. Spontaneously returned to HR in 80s but remains with bigeminy.     Admission Diagnosis:   Lower extremity edema [R60.0]  Generalized weakness [R53.1]    Physical Exam   Temp: 98.1  F (36.7  C) Temp  Min: 98  F (36.7  C)  Max: 98.1  F (36.7  C)  Resp: 16 Resp  Min: 16  Max: 18  SpO2: 98 % SpO2  Min: 92 %  Max: 98 %  Pulse: 81 Pulse  Min: 44  Max: 90    No data recorded  BP: 125/56 Systolic (24hrs), Av , Min:125 , Max:143   Diastolic (24hrs), Av, Min:50, Max:67     I/Os: I/O last 3 completed shifts:  In: 1375 [P.O.:700; I.V.:675]  Out: 1100 [Urine:1100]     Exam:   General: Elderly male, Frail appearing. No acute distress.  Pulmonary: Lungs CTAB. No wheezes.   CV: Regular with frequent PVCs, mostly in bigeminy.     Significant Results and Procedures   Lactic Acid:   Recent Labs   Lab Test 23  1134 23  1635   LACT 1.3 1.9     CBC:   Recent Labs   Lab Test 23  0607 23  0623 23  1234 23  0632   WBC  --  12.6*  12.4* 12.0*   HGB  --  8.7* 9.8* 10.4*   HCT  --  26.3* 29.8* 31.9*    159 181 181

## 2023-12-01 NOTE — CONSULTS
Care Management Initial Consult    General Information  Assessment completed with: Patient,    Type of CM/SW Visit: Initial Assessment    Primary Care Provider verified and updated as needed: Yes   Readmission within the last 30 days: no previous admission in last 30 days      Reason for Consult: discharge planning  Advance Care Planning: Advance Care Planning Reviewed: verified with patient (Pt report that [facility] with  [facility])          Communication Assessment  Patient's communication style: spoken language (English or Bilingual)    Hearing Difficulty or Deaf: no   Wear Glasses or Blind: yes    Cognitive  Cognitive/Neuro/Behavioral: .WDL except  Level of Consciousness: alert  Arousal Level: opens eyes spontaneously  Orientation: disoriented to, time  Mood/Behavior: calm, cooperative  Best Language: 0 - No aphasia  Speech: clear, spontaneous, logical    Living Environment:   People in home: child(nakita), adult, spouse     Current living Arrangements: house      Able to return to prior arrangements: yes       Family/Social Support:  Care provided by: self  Provides care for: no one  Marital Status:   Wife, Children          Description of Support System: Supportive    Support Assessment: Adequate family and caregiver support    Current Resources:   Patient receiving home care services: No     Community Resources: Other (see comment) ([facility])  Equipment currently used at home: cane, straight, other (see comments), shower chair (Pt reports has cane at home but does not use.)  Supplies currently used at home: Hearing Aid Batteries    Employment/Financial:  Employment Status: retired        Financial Concerns: none   Referral to Financial Worker: No       Does the patient's insurance plan have a 3 day qualifying hospital stay waiver?  Yes     Which insurance plan 3 day waiver is available? [employer]    Will the waiver be used for post-acute placement? Undetermined at this  "time    Lifestyle & Psychosocial Needs:  Social Determinants of Health     Food Insecurity: Not on file   Depression: Not at risk (11/16/2023)    PHQ-2     PHQ-2 Score: 0   Housing Stability: Not on file   Tobacco Use: Low Risk  (11/30/2023)    Patient History     Smoking Tobacco Use: Never     Smokeless Tobacco Use: Never     Passive Exposure: Not on file   Financial Resource Strain: Not on file   Alcohol Use: Not on file   Transportation Needs: Not on file   Physical Activity: Not on file   Interpersonal Safety: Not on file   Stress: Not on file   Social Connections: Not on file       Functional Status:  Prior to admission patient needed assistance:   Dependent ADLs:: Independent  Dependent IADLs:: Independent       Mental Health Status:  Mental Health Status: No Current Concerns       Chemical Dependency Status:  Chemical Dependency Status: No Current Concerns         Values/Beliefs:  Spiritual, Cultural Beliefs, Hoahaoism Practices, Values that affect care: no             Additional Information:  Met with pt at bedside for CM assessment/discharge planning. Rehab team is recommending TCU due to \"impaired mobility below baseline and requiring maxA2 with walker for amb and Ax2 for bed mobility.\" Per primary team, pt will discharge with torres catheter that should stay in for a month and is requiring wound care. Pt was hesitant about TCU but later agreed. He wants referral to be sent to [facility] due to proximity to home since family does not drive. Referrals sent.       Pending:  [facility] ([facility])  [address] [address]   [phone]   [phone]    [facility] ([facility])  [address] [id number] [phone]   [phone]    [facility]  [address]  [facility] [id number] [phone]    [facility] ([facility])  [address] [facility] [id number] [phone]   " [phone]    Yashira Johnson, 5A Oncology & 5C non BMT [facility]  P:   Pager: [phone]  F: [phone]  Weekend & FV Recognized Holidays Pager: [phone]  Weekend Coverage: 5A & 5C

## 2023-12-01 NOTE — PLAN OF CARE
/68   Pulse 81   Temp 98.4  F (36.9  C) (Oral)   Resp 18   Wt 57.6 kg (126 lb 15.8 oz)   SpO2 98%   BMI 19.89 kg/m      AVSS on RA. Pt up with heavy assist x2 to pivot from chair to bed overnight. Pt reporting pain in LEs when limbs are touched, but otherwise offers no complaints. Legs re-wrapped in the evening. Pt slept well between cares. Repositioned q2hrs. Sandoval patent. No stools overnight. Plan to discharge to TCU per PT's recommendation. Phos level was low from previous shift, 9mmol replaced overnight. Pt will need an additional 9mmol this morning. Continue POC.    Problem: Adult Inpatient Plan of Care  Goal: Optimal Comfort and Wellbeing  Outcome: Progressing     Problem: Fall Injury Risk  Goal: Absence of Fall and Fall-Related Injury  Outcome: Progressing

## 2023-12-01 NOTE — PROGRESS NOTES
Occupational Therapy Evaluation: 12/01/23 1052   Appointment Info   Signing Clinician's Name / Credentials (OT) Dacia Grimaldo OTKIRSTY, OTR/L   Rehab Comments (OT) DUTCH, has hearing aids   Living Environment   People in Home child(nakita), adult;spouse;other (see comments)  (Adult son and daughter)   Current Living Arrangements house   Home Accessibility stairs to enter home;stairs within home   Number of Stairs, Main Entrance 6   Stair Railings, Main Entrance railing on right side (ascending)   Number of Stairs, Within Home, Primary eight   Stair Railings, Within Home, Primary railing on right side (ascending)   Transportation Anticipated other (see comments)  (Pt reports he drives his daughter to the bus stop every morning, unsure accuracy of statement.)   Living Environment Comments GER[place] home and within home, walk in shower with small threshold located downstairs. Pt living with spouse and adult son and daughter.   Self-Care   Usual Activity Tolerance good   Current Activity Tolerance poor   Equipment Currently Used at Home cane, straight;other (see comments);shower chair  (Pt reports has cane at home but does not use.)   Fall history within last six months yes   Number of times patient has fallen within last six months 1  (Pt states fell while ambulating on sloped down concrete.)   Activity/Exercise/Self-Care Comment Pt reports ind with ADLs at baseline.   Instrumental Activities of Daily Living (IADL)   Previous Responsibilities meal prep;housekeeping;laundry;shopping;yardwork;medication management;finances;driving   IADL Comments Pt reports ind with IADLs at baseline, pt states son shovels in winter. According to pt - is still driving, shopping, and managing finances and medications.   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.\"   Existing Precautions/Restrictions fall   Limitations/Impairments safety/cognitive   Left Upper Extremity (Weight-bearing Status) full weight-bearing (FWB)   Right Upper Extremity (Weight-bearing Status) full weight-bearing (FWB)   Left Lower Extremity (Weight-bearing Status) full weight-bearing (FWB)   Right Lower Extremity (Weight-bearing Status) full weight-bearing (FWB)   General Observations and Info Activity: up ad akanksha   Cognitive Status Examination   Orientation Status person   Follows Commands does not follow one-step commands   Memory Deficit moderate deficit   Attention Deficit moderate deficit   Cognitive Status Comments Pt not following step by step instructions this date, stated was at \"[facility]\".   Visual Perception   Visual Impairment/Limitations corrective lenses full-time   Sensory   Sensory Comments Pt reports numbness and tingling in fingers   Pain Assessment   Patient Currently in Pain Yes, see Vital Sign flowsheet   Posture   Posture forward head position;protracted shoulders;kyphosis   Range of Motion Comprehensive   General Range of Motion no range of motion deficits identified   Strength Comprehensive (MMT)   General Manual Muscle Testing (MMT) Assessment upper extremity strength deficits identified   Comment, General Manual Muscle Testing (MMT) Assessment Decreased overall strength in UE   Coordination   Upper Extremity Coordination No deficits were identified   Bed Mobility   Bed Mobility supine-sit   Comment (Bed Mobility) Max A x2   Transfers   Transfers toilet transfer   Transfer Comments Max A x2 per clinical judgement   Activities of Daily Living   BADL Assessment/Intervention bathing;lower body dressing;grooming;toileting   Bathing Assessment/Intervention "   [place] Level (Bathing) maximum assist (25% patient effort)   Comment, (Bathing) Per clinical judgement   Lower Body Dressing Assessment/Training   Comment, (Lower Body Dressing) Per clinical judgement   [place] Level (Lower Body Dressing) dependent (less than 25% patient effort)   Grooming Assessment/Training   [place] Level (Grooming) maximum assist (25% patient effort)   Comment, (Grooming) Per clinical judgement   Toileting   Comment, (Toileting) Per clinical judgement   [place] Level (Toileting) maximum assist (25% patient effort)   Clinical Impression   Criteria for Skilled Therapeutic Interventions Met (OT) Yes, treatment indicated   OT Diagnosis Deconditioing   OT Problem List-Impairments impacting ADL problems related to;activity tolerance impaired;balance;cognition;hearing;mobility;strength;pain   Assessment of Occupational Performance 3-5 Performance Deficits   Identified Performance Deficits Dressing, g/h, bathing, toileting, functional transfers/mobility, cognition/safety awareness   Planned Therapy Interventions (OT) ADL retraining;IADL retraining;cognition;strengthening;home program guidelines;progressive activity/exercise;risk factor education   Clinical Decision Making Complexity (OT) problem focused assessment/low complexity   Risk & Benefits of therapy have been explained evaluation/treatment results reviewed;care plan/treatment goals reviewed;risks/benefits reviewed;current/potential barriers reviewed;participants voiced agreement with care plan;participants included;patient   Clinical Impression Comments Pt would benefit from skilled IP OT services to increase ind and safety with ADL/IADL completion as well as functional transfers/mobility.   OT Goals   Therapy Frequency (OT) 6 times/week   OT Predicted Duration/Target Date for Goal Attainment 12/22/23   OT Goals Upper Body Bathing;Lower Body Bathing;Hygiene/Grooming;Lower Body Dressing;Toilet Transfer/Toileting;Cognition    OT: Hygiene/Grooming modified independent   OT: Lower Body Dressing Modified independent   OT: Upper Body Bathing Modified independent   OT: Lower Body Bathing Modified independent   OT: Toilet Transfer/Toileting Modified independent   OT: Cognitive Patient/caregiver will verbalize understanding of cognitive assessment results/recommendations as needed for safe discharge planning   OT Discharge Planning   OT Plan SLUMS, functional mobility/transfers, standing ADLs, LB dressing (with AE)   OT Discharge Recommendation (DC Rec) Transitional Care Facility   OT Rationale for DC Rec This date, pt demonstrating significant impairments, requiring max A2 with 2ww for amb/ADL/IADLs and Max Ax2 for bed mobility. Will need to confirm baseline function with family members as conflicting statements provided to PT vs OT.  Will continue to assess and update discharge recommendation throughout hospitalization.   OT Brief overview of current status OH lift bed<>recliner/BSC, Max A x2 bed mobility   OT Equipment Needed at Discharge other (see comments)  (TBD)

## 2023-12-02 NOTE — PLAN OF CARE
"/52 (BP Location: Right arm)   Pulse 72   Temp 98.4  F (36.9  C) (Axillary)   Resp 12   Wt 57.6 kg (126 lb 15.8 oz)   SpO2 97%   BMI 19.89 kg/m      AVSS on room air. Patient denies chest pain or SOB. Patient is heavy assist x2 with pivot from chair to bed overnight; lift utilized this shift. Patient denies pain except for when lower legs are palpated. Legs re-wrapped this morning. No stools this shift. Phosphorous replaced via IV recheck tomorrow morning. Continue with plan of care; will notify provider with changes.     Goal Outcome Evaluation:      Plan of Care Reviewed With: patient    Overall Patient Progress: improving    Problem: Adult Inpatient Plan of Care  Goal: Plan of Care Review  Description: The Plan of Care Review/Shift note should be completed every shift.  The Outcome Evaluation is a brief statement about your assessment that the patient is improving, declining, or no change.  This information will be displayed automatically on your shift  note.  Outcome: Progressing  Flowsheets (Taken 12/1/2023 1958)  Plan of Care Reviewed With: patient  Overall Patient Progress: improving  Goal: Patient-Specific Goal (Individualized)  Description: You can add care plan individualizations to a care plan. Examples of Individualization might be:  \"Parent requests to be called daily at 9am for status\", \"I have a hard time hearing out of my right ear\", or \"Do not touch me to wake me up as it startles  me\".  Outcome: Progressing  Goal: Absence of Hospital-Acquired Illness or Injury  Outcome: Progressing  Intervention: Identify and Manage Fall Risk  Recent Flowsheet Documentation  Taken 12/1/2023 1500 by Rebecca Holley, RN  Safety Promotion/Fall Prevention: safety round/check completed  Taken 12/1/2023 1230 by Rebecca Holley, RN  Safety Promotion/Fall Prevention: safety round/check completed  Taken 12/1/2023 0830 by Rebecca Holley, RN  Safety Promotion/Fall Prevention: safety round/check " completed  Intervention: Prevent Skin Injury  Recent Flowsheet Documentation  Taken 12/1/2023 1430 by Rebecca Holley RN  Body Position: (patient up in chair, and wants to continue to stay in chair)   turned   heels elevated   legs elevated  Taken 12/1/2023 1300 by Rebecca Holley RN  Body Position: (patient wants to continue to stay up in chair)   turned   heels elevated   legs elevated  Taken 12/1/2023 1100 by Rebecca Holley RN  Body Position: (patient up in chair)   turned   heels elevated   legs elevated  Taken 12/1/2023 0845 by Rebecca Holley RN  Body Position:   turned   heels elevated   legs elevated   side-lying  Taken 12/1/2023 0830 by Rebecca Holley RN  Skin Protection:   incontinence pads utilized   pectin skin barriers applied  Device Skin Pressure Protection:   pressure points protected   absorbent pad utilized/changed   tubing/devices free from skin contact  Intervention: Prevent and Manage VTE (Venous Thromboembolism) Risk  Recent Flowsheet Documentation  Taken 12/1/2023 0830 by Rebecca Holley RN  VTE Prevention/Management: SCDs (sequential compression devices) off  Intervention: Prevent Infection  Recent Flowsheet Documentation  Taken 12/1/2023 1500 by Rebecca Holley RN  Infection Prevention:   visitors restricted/screened   single patient room provided   rest/sleep promoted   personal protective equipment utilized   equipment surfaces disinfected  Taken 12/1/2023 1230 by Rebecca Holley RN  Infection Prevention:   visitors restricted/screened   single patient room provided   rest/sleep promoted   personal protective equipment utilized   equipment surfaces disinfected  Taken 12/1/2023 0830 by Rebecca Holley RN  Infection Prevention:   visitors restricted/screened   single patient room provided   rest/sleep promoted   personal protective equipment utilized   equipment surfaces disinfected  Goal: Optimal Comfort and Wellbeing  Outcome: Progressing  Goal: Readiness for  Transition of Care  Outcome: Progressing     Problem: Fall Injury Risk  Goal: Absence of Fall and Fall-Related Injury  Outcome: Progressing  Intervention: Identify and Manage Contributors  Recent Flowsheet Documentation  Taken 12/1/2023 1500 by Rebecca Holley RN  Medication Review/Management: medications reviewed  Taken 12/1/2023 1230 by Rebecca Holley RN  Medication Review/Management: medications reviewed  Taken 12/1/2023 0830 by Rebecca Holley RN  Medication Review/Management: medications reviewed  Intervention: Promote Injury-Free Environment  Recent Flowsheet Documentation  Taken 12/1/2023 1500 by Rebecca Holley RN  Safety Promotion/Fall Prevention: safety round/check completed  Taken 12/1/2023 1230 by Rebecca Holley RN  Safety Promotion/Fall Prevention: safety round/check completed  Taken 12/1/2023 0830 by Rebecca Holley RN  Safety Promotion/Fall Prevention: safety round/check completed

## 2023-12-02 NOTE — PLAN OF CARE
"/54 (BP Location: Right arm)   Pulse 67   Temp 98.1  F (36.7  C) (Oral)   Resp 20   Wt 57.6 kg (126 lb 15.8 oz)   SpO2 94%   BMI 19.89 kg/m    Pt's AVSS, maintaining sat's in the upper 90's while on RA and continue to be on tele and in SB/SR with HR 60-80's and occasional PVC's. Pt c/o right foot heal hurting. Leg was elevated on pillows and pt turned q2h over night. Bilateral leg dressing was done during the shift. PIV intact and Sl'd. Pt had uneventful night. Keep monitoring pt as ordered and notify MD with any new changes.   Problem: Adult Inpatient Plan of Care  Goal: Plan of Care Review  Description: The Plan of Care Review/Shift note should be completed every shift.  The Outcome Evaluation is a brief statement about your assessment that the patient is improving, declining, or no change.  This information will be displayed automatically on your shift  note.  Outcome: Progressing  Goal: Patient-Specific Goal (Individualized)  Description: You can add care plan individualizations to a care plan. Examples of Individualization might be:  \"Parent requests to be called daily at 9am for status\", \"I have a hard time hearing out of my right ear\", or \"Do not touch me to wake me up as it startles  me\".  Outcome: Progressing  Goal: Absence of Hospital-Acquired Illness or Injury  Outcome: Progressing  Intervention: Identify and Manage Fall Risk  Recent Flowsheet Documentation  Taken 12/2/2023 0021 by Jenny Tapia RN  Safety Promotion/Fall Prevention:   clutter free environment maintained   increase visualization of patient  Taken 12/1/2023 2115 by Jenny Tapia RN  Safety Promotion/Fall Prevention:   clutter free environment maintained   increase visualization of patient  Intervention: Prevent Skin Injury  Recent Flowsheet Documentation  Taken 12/2/2023 0021 by Jenny Tapia RN  Body Position:   turned   left  Device Skin Pressure Protection: positioning supports utilized  Taken 12/1/2023 2115 " by Jenny Tapia RN  Body Position:   turned   left  Device Skin Pressure Protection: positioning supports utilized  Intervention: Prevent Infection  Recent Flowsheet Documentation  Taken 12/2/2023 0021 by Jenny Tapia RN  Infection Prevention:   rest/sleep promoted   hand hygiene promoted  Taken 12/1/2023 2115 by Jenny Tapia RN  Infection Prevention:   rest/sleep promoted   hand hygiene promoted  Goal: Optimal Comfort and Wellbeing  Outcome: Progressing  Intervention: Monitor Pain and Promote Comfort  Recent Flowsheet Documentation  Taken 12/1/2023 2115 by Jenny Tapia RN  Pain Management Interventions:   pillow support provided   repositioned  Goal: Readiness for Transition of Care  Outcome: Progressing     Problem: Adult Inpatient Plan of Care  Goal: Optimal Comfort and Wellbeing  Outcome: Progressing  Intervention: Monitor Pain and Promote Comfort  Recent Flowsheet Documentation  Taken 12/1/2023 2115 by Jenny Tapia RN  Pain Management Interventions:   pillow support provided   repositioned     Problem: Adult Inpatient Plan of Care  Goal: Optimal Comfort and Wellbeing  Intervention: Monitor Pain and Promote Comfort  Recent Flowsheet Documentation  Taken 12/1/2023 2115 by Jenny Tapia RN  Pain Management Interventions:   pillow support provided   repositioned     Problem: Adult Inpatient Plan of Care  Goal: Readiness for Transition of Care  Outcome: Progressing     Problem: Fall Injury Risk  Goal: Absence of Fall and Fall-Related Injury  Outcome: Progressing  Intervention: Promote Injury-Free Environment  Recent Flowsheet Documentation  Taken 12/2/2023 0021 by Jenny Tapia RN  Safety Promotion/Fall Prevention:   clutter free environment maintained   increase visualization of patient  Taken 12/1/2023 2115 by Jenny Tapia RN  Safety Promotion/Fall Prevention:   clutter free environment maintained   increase visualization of patient     Problem: Fall Injury  Risk  Goal: Absence of Fall and Fall-Related Injury  Intervention: Promote Injury-Free Environment  Recent Flowsheet Documentation  Taken 12/2/2023 0021 by Jenny Tapia RN  Safety Promotion/Fall Prevention:   clutter free environment maintained   increase visualization of patient  Taken 12/1/2023 2115 by Jenny Tapia RN  Safety Promotion/Fall Prevention:   clutter free environment maintained   increase visualization of patient   Goal Outcome Evaluation:

## 2023-12-02 NOTE — CONSULTS
[facility]  WO Nurse Inpatient Assessment     Consulted for: RLE    12/1- New consult- R) heel    Patient History (according to H&P provider note(s) 11/27/23:      Emigdio Hannah is a 89 year old male with history of recent onset of jaundice and and presents for evaluation of generalized weakness and found to have ERIC and bilateral lower extremity pitting edema of unclear etiology. Will give small fluid challenge for ERIC. Ordered RUQ US to evaluate for PVT as etiology of LE edema.     # bilateral LE pitting edema  No evidence of HF on exam. C/f possible PVT and will obtain US to rule this out.   - RUQ as above  - consider TTE   - consider WOC consult given scattered wounds on bl LE    *Now s/p ENDOSCOPIC ULTRASOUND,EGD,bile duct drainage, stent placement  on 11/28 with Betty Cortez.    Assessment:      Areas visualized during today's visit: Lower extremities     Wound location: R) heel        Last photo: 12/1  Wound due to:  dry skin now scabbed  Wound history/plan of care: RLE, followed by derm. Suspect same etiology (severe eczema) on heel as well.  Wound base: 100 % superficial scab,      Palpation of the wound bed: normal      Drainage: none     Description of drainage: none     Measurements (length x width x depth, in cm): 0.4  x 0.5  x  0.0 cm      Tunneling: N/A     Undermining: N/A  Periwound skin: Intact and Dry/scaly      Color: red      Temperature: normal   Odor: none  Pain: mild, none  Pain interventions prior to dressing change: patient tolerated well  Treatment goal: Heal , Increase moisture , and Protection  STATUS: initial assessment  Supplies ordered: discussed with RN     Wound location: RLE     11/29: Lateral right lower leg   11/29: Medial right lower leg    11/29: anterior RLE    Last photo: 11/29/23  Wound due to: Unknown Etiology  Wound history/plan of care: see HPI, pt given lasix and per RN LE are much less edematous and swelling has decreased to  bilateral LE. LLE looks similar but much less severe and never had any drainage. Could be vasculitis, would need derm consult to verify and recommend additional treatment  Wound base: 100 % superficial scab, scattered with weeping from 2 pinpoint areas to medial RLE     Palpation of the wound bed: normal      Drainage: copious     Description of drainage: serous     Measurements (length x width x depth, in cm): pinpoint open areas previously leaking- now dried scabs     Tunneling: N/A     Undermining: N/A  Periwound skin: Intact, Dry/scaly, Edematous, and Scar tissue      Color: normal and consistent with surrounding tissue and purple, slightly jaundiced      Temperature: normal   Odor: none  Pain: moderate and during dressing change, tender  Pain interventions prior to dressing change: slow and gentle cares   Treatment goal: Heal , Maintain (prevention of deterioration), and edema control  STATUS: initial assessment  Supplies ordered: ordered edemawear, supplies stored on unit, discussed with RN, and discussed with patient       Treatment Plan:   R) heel- Apply thick layer of aquaphor cream. Offload BL heels using pillow.    Follow derm recommendation- aquaphor and steroid cream.  Bilateral lower leg wound(s): Daily and PRN when soiled change ABD pads to RLE   Spray arun cleanse and protect spray onto dry wipes or hands and use to cleanse BLE and allow to air dry- ensure between toes dries  Apply small edemawear (420335) white netting with blue stripe. DO NOT THRO AWAY- these can be washed and hung to dry and reused for 4-6 months  See WOC note for further edemawear instruction  Apply ABD pad and kerlix over RLE if draining- secure with tape. No need for this step if not draining  If pt c/o pain due to edemawear please remove    EdemaWear stockings: Use and Care    Rationale for use:   Decreases edema by improving lymphatic and venous function    Safe and gentle compression  Enhances wound healing  Protects  "skin    General:   EdemaWear can be worn 24/7, but should be removed at least daily for skin inspection and cares    In order to be effective, EdemaWear should DIRECTLY contact the skin as much as possible -- the mesh weave promotes lymphatic drainage when it is pressed into the skin  Choose appropriate size EdemaWear based on leg (or arm) circumference - see table for guidelines  EdemaWear LITE is only for especially fragile or painful skin  Cleanse and moisturize any intact or scaly skin before applying EdemaWear  Ok to apply additional compression over the EdemaWear (ie Lymph wraps)    Application:   Apply the EdemaWear from base of toes to knee, or above knee if tolerated and it is a long stocking  Create a wide 3\" cuff at the top if needed to prevent rolling down  Only trim the stocking if it is excessively long; do NOT cut in half; can often fold over excess length onto foot    When wounds are present:  Wound dressings that directly treat the wound bed can be applied under the EdemaWear  Any additional cover dressings (dry gauze, ABD pads, Kerlix, etc) should be applied ON TOP of the stockings whenever feasible   Stockings may get soiled with drainage and will need to be washed; ensure an extra clean, dry pair always available  May need two people to apply the stocking - bunch up stocking and pull against each other, lifting over wounds    Care:  When stockings are soiled, DO NOT THROW AWAY, hand wash with mild soap, rinse, hang dry  Replace approximately every 4 to 6 months        EdemaWear size Max circumference Stripe color PS # # stockings per pack   Small 18\"  (45cm) navy [id number] 2   Medium 30\"  (75cm) yellow [id number] 1   Large 46\"  (115cm) red [id number] 1   X Large 60\"  (150cm) aqua [id number] 1   Small LITE 24\"  (60cm) purple 236686 2   Medium LITE 36\"  (90cm) orange [id number] 1   Cheapest place to order more stockings is on [employer] website ()    Orders: Written    RECOMMEND PRIMARY TEAM " ORDER: Dermatology consult  Education provided: plan of care and wound progress  Discussed plan of care with: Patient and Nurse  [facility] nurse follow-up plan: prn- derm is following  Notify [facility] if wound(s) deteriorate.  Nursing to notify the Provider(s) and re-consult the [facility] Nurse if new skin concern.    DATA:     Current support surface: Standard  Standard gel/foam mattress (IsoFlex, Atmos air, etc)  Containment of urine/stool: Incontinent pad in bed  BMI: Body mass index is 20.03 kg/m .   Active diet order: Orders Placed This Encounter      Regular Diet Adult     Output: I/O last 3 completed shifts:  In: 450 [P.O.:450]  Out: 1775 [Urine:1775]     Labs:   Recent Labs   Lab 12/02/23  0436 12/01/23  0453 11/30/23  0607 11/29/23  0623 11/28/23  1234   ALBUMIN 2.3*   < >  --    < > 3.2*   HGB  --   --  8.8*   < > 9.8*   INR  --   --   --   --  1.31*   WBC  --   --  11.1*   < > 12.4*   A1C  --   --  6.1*  --   --     < > = values in this interval not displayed.     Pressure injury risk assessment:   Sensory Perception: 2-->very limited  Moisture: 3-->occasionally moist  Activity: 2-->chairfast  Mobility: 2-->very limited  Nutrition: 3-->adequate  Friction and Shear: 2-->potential problem  Virgil Score: 14      Pager no longer is use, please contact through [employer]   [place] group: [facility] Nurse [place]  Dept. Office Number: *[phone]

## 2023-12-02 NOTE — PROVIDER NOTIFICATION
"Provider TOPHER Caballero text paged via [employer] \"Patient is displaying multiple PVCs in cardiac monitoring. Do you want an EKG?\"    Provider ordered EKG.    Provider TOPHER Hart text paged via [employer] \"FYI his EKG has resulted. Thank you.\"  "

## 2023-12-02 NOTE — PROGRESS NOTES
[facility]    Medicine Progress Note - Hospitalist Service, GOLD TEAM 1    Date of Admission:  11/27/2023    Assessment & Plan   Emigdio Hannah is a 89 year old male with no past medical history, who was admitted to [facility] 11/27/2023 for further evaluation of generalized weakness and jaundice in the setting of recently found pancreatic mass.    Changes Today:   - Awaiting insurance approval for TCU  - PT/OT  - Long discussion with patient and family regarding life expectancy    Goals of Care  Long discussion had with patient regarding goals of care and life expectancy in the setting of new diagnosis of HFrEF and question of pancreatic cancer. He states his goals is to live to 89 and he has concerns regarding his wife and children's ability to care for themselves if something were to happen to him.  Verbalizes understanding of the concerns of his prognosis, but would like to attempt to rehab at TCU enough to get home. Agrees with DNR/DNI code status. Family was notified of medicine teams concerns and also verbalized understanding.   - Defer further discussions of hospice to TCU provider (pending progression in therapies) and PCP     Bilateral LE Edema (R>L)  Severe Eczema craquele  Acute occlusive superficial thrombus of left gastrocnemius vein  Patient presented with bilateral lower extremity edema and scattered wounds throughout LE. US on admission showed acute superficial clot of left gastrocnemius vein. Initially started on heparin drip, but this was discontinued and Vitamin K administered due to procedure below. Discussed case with hematology who reports that there are disagreements regarding whether or not gastrocnemius is a deep or superficial vein, but likely no indication for treatment in this patient (especially in his age and likely higher risk at falls thus bleeds). Etiology of bilateral LE edema likely due to new diagnosis of HFrEF. Appears ED was initially  "concerned for cellulitis and patient was started on antibiotics. WOCN saw patient and recommended derm consultation. IV antibiotics were discontinued and patient has done well off of them. Derm reviewed images and stated it appeared to be severe eczema craquele and patient and started on steroid emolient.    - WOCN following  - Derm following   - Should repeat LE US in 1-2 weeks, or sooner if indicated, to evaluate for clot propagation   - At this time, if felt needed/indicated, could start anticoagulation    Deconditioning  Per family, patient up walking with cane PTA. Drives, does yard work and requires very minimal assistance. On admission, requiring heavy assist of two to use commode. PT consulted and recommended TCU on discharge. Social work working on dispo.   - PT/OT following  - Social work following    ERIC, resolved  Urinary Retention  Likely BPH  Multiple imaging studies showed distended bladder and patient reported difficulty initiating urination likely consistent with BPH. On admission, creatinine elevated to 2.93 from baseline of ~1. Suspect likely multifactorial related to poor oral intake and history/imaging consistent with BPH. A torres catheter was placed on admission and patient received IV fluids with noted improving creatinine. Electing to start tamsulosin for probable BPH. Trial of straight cathing was performed, and patient unable to void without assistance. Indwelling torres catheter was placed again, and will be present on discharge. He will need follow up with urology on discharge.   - Continue indwelling torres  - Continue tamsulosin (at night)  - Follow creatinine  - Patient will need follow up with urology on discharge    Bradycardia, resolved  Troponinemia, stable  Hypophosphatemia, improving (**please note this was incorrectly noted as hypomagnesemia in prior notes)  Patient with bradycardia event 11/30 prior to PT/OT, following \"large bowel movement.\" HR dropped to 40's. Stat EKG ordered " and rapid called. EKG without significant change from prior. Labs showed hypophosphatemia and elevated troponin. Repeat troponin with no sig change--likely baseline in age group/situation. Unclear exact etiology behind bradycardia, question if vasovagal event versus hypophosphatemia. Recovered to prior heart rate without issue. Discussed code status with family multiple times on 11/30,12/1 and eventually elected to changed to DNR/DNI. Of note, was found to have multiple PVC's on tele. No symptoms. ECG confirmed. No further work-up at that time.   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 Showed decreased EF of 42% and valvular abnormalities as noted above. No prior for comparison. Edema as noted above, but otherwise asymptomatic. Weight has been stable. At this time, given his age, borderline low blood pressures, bradycardic event as noted above, would not start any treatment.   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 Work-up at that time showed total bilirubin of 18.1, alk phos of 1.074 and CT scan showed large cystic mass along CBD. Presented to [facility] 11/27 with weakness, and bilateral lower extremity edema. On admission, repeat imaging showed stable pancreatic mass and actually improved bilirubin to 3.6 and alk phos to 454.  Also noted to have leukocytosis to 14.3. Ceftriaxone and vancomycin were initiated due to concern for intraabdominal  infection. GI was consulted and patient underwent ERCP 11/28, which showed findings consistent with IPMN vs pseudocyst. Given age, elected to proceed with draining without sampling. Stent was placed and will be left in indefinitely. Patient tolerated procedure well and was able to advance diet appropriately. GI subsequently signed off. Liver enzymes and t bili have continued to improve. Patient feeling well. At this time, no further monitoring indicated.   - Check hepatic panel, cbc PRN    Hard of hearing  Patient very hard of hearing despite hearing aid in place. Difficult to communicate and determine how much of situation patient understands. Initially, his responses were intermittently appropriate and inappropriate.  Using pocket talker and patient seems to have better understanding of situation.   - Noted    Constipation Continue daily MiraLax.           Diet: Regular Diet Adult    DVT Prophylaxis: VTE Prophylaxis contraindicated due to cellulitis and recent procedure  Sandoval Catheter: PRESENT, indication: Retention, Retention  Lines: None     Cardiac Monitoring: None  Code Status: No CPR- Do NOT Intubate      Clinically Significant Risk Factors              # Hypoalbuminemia: Lowest albumin = 2.3 g/dL at 12/2/2023  4:36 AM, will monitor as appropriate                  # Financial/Environmental Concerns: none         Disposition Plan      Expected Discharge Date: 12/03/2023        Discharge Comments: SW working on TCU          The patient's care was discussed with the Attending Physician, Dr. Wong, Bedside Nurse, PT/OT, social work, Patient, and Patient's Family (wife and daughter).     Luci Altamirano PA-C  Hospitalist Service, GOLD TEAM 1  [facility]  Securely message with [employer] (more info)  Text page via [employer] Paging/Directory   See signed in provider for up to date coverage  information  ______________________________________________________________________    Interval History   Patient feeling well. Thinks it is a good idea to go to a transitional care unit. No acute complaints at this time.    Discussed life expectancy in the setting of new diagnosis of heart failure and potential pancreatic cancer and that hospice would be appropriate. He would like to pursue further rehab at this time.     Physical Exam   Vital Signs: Temp: 98.2  F (36.8  C) Temp src: Axillary BP: 131/64 Pulse: 77   Resp: 18 SpO2: 98 % O2 Device: None (Room air)    Weight: 127 lbs 13.87 oz    General Appearance: Comfortable, nontoxic appearing male seen laying in bed. Brushing his hair.   Eyes: PERRLA. No conjunctival icterus.  HEENT: Atraumatic.  Respiratory: Breathing comfortably on room air.    Musculoskeletal: Moving all extremities spontaneously.  Neurologic: Cranial nerves II through XII grossly intact.  Psychiatric: Mood appropriate.    Medical Decision Making     60 MINUTES SPENT BY ME on the date of service doing chart review, history, exam, documentation & further activities per the note.      Data   Imaging results reviewed over the past 24 hrs:   No results found for this or any previous visit (from the past 24 hour(s)).    Recent Labs   Lab 12/02/23  0436 12/01/23  0453 11/30/23  1153 11/30/23  0607 11/29/23  0638 11/29/23  0623 11/28/23  1234 11/28/23  0632 11/27/23  1533   WBC  --   --   --  11.1*  --  12.6* 12.4* 12.0* 14.3*   HGB  --   --   --  8.8*  --  8.7* 9.8* 10.4* 11.1*   MCV  --   --   --  97  --  93 94 95 94   PLT  --   --   --  154  154  --  159 181 181 211   INR  --   --   --   --   --   --  1.31* 1.28*  --     142 144  --    < > 142 139 136 136   POTASSIUM 3.7 3.8 3.7  --    < > 4.2 4.3 4.8 5.0   CHLORIDE 107 108* 107  --    < > 106 102 100 98   CO2 28 27 26  --    < > 26 26 24 23   BUN 22.8 25.3* 29.5*  --    < > 44.0* 63.6* 71.5* 66.6*   CR 0.87 0.90 0.95  --    < > 1.37* 2.36*  2.93* 2.83*   ANIONGAP 6* 7 11  --    < > 10 11 12 15   YG 7.6* 7.8* 8.5  --    < > 8.5 9.1 9.4 9.7*   * 113* 236*  --    < > 109* 111* 126* 144*   ALBUMIN 2.3* 2.3*  --   --    < > 2.6* 3.2* 3.4* 4.0   PROTTOTAL 4.6* 4.9*  --   --    < > 4.8* 6.3* 6.6 8.0   BILITOTAL 2.6* 2.8*  --   --    < > 3.7* 4.6* 4.9* 6.4*   ALKPHOS 197* 242*  --   --    < > 261* 325* 348* 454*   ALT 30 34  --   --    < > 44 49 55 73*   AST 28 35  --   --    < > 27 28 30 44   LIPASE  --   --   --   --   --   --  37  --  43    < > = values in this interval not displayed.

## 2023-12-02 NOTE — PROGRESS NOTES
Care Management Follow Up    Length of Stay (days): 5    Expected Discharge Date: 12/04/2023     Concerns to be Addressed:       Patient plan of care discussed at interdisciplinary rounds: Yes    Anticipated Discharge Disposition:  TCU     Anticipated Discharge Services:  transportation  Anticipated Discharge DME:  n/a    Patient/family educated on Medicare website which has current facility and service quality ratings:  yes  Education Provided on the Discharge Plan:  yes  Patient/Family in Agreement with the Plan:  yes    Referrals Placed by CM/SW:  see below  Private pay costs discussed: transportation costs    [facility] ([facility])  [address] [place] [address]. [address] [facility] [address]   [phone]   [phone]  12/2: SANDRA called 9:16 am facility is reviewing and will call SW back  Okay from admissions - needs prior authorization  Anna Pager number for the weekend if Prior auth comes in  Pager #[phone]    [facility] ([facility])  [address] [facility] [address]   [phone]   [phone]  12/2: n/a    [facility]  [address]  [facility] [address]   [phone]   [phone]      [facility] ([facility])  [address] [place] [address]   [phone]   [phone]      Additional Information:  NICOLE updated SW that pt is medically ready. SW let her know that she will call on referrals at 9:00 am.   SANDRA received an acceptance from [employer]. 9:30 am  Pt needs a prior authorization from ins.. SANDRA called [employer] and received a case # [id number]. SANDRA faxed medical information for prior auth to [phone]. Fax went through.     Anna from [employer], told SANDRA that pt could admit before prior auth is approved, but thomas pt know that if it is not approved it would be private pay.   SANDRA called pt's wife to explain prior auth and approval . Pt's wife is very nervous about having to pay privately. SANDRA called and let Elmo know  that family would like to wait until approved.   Pt can be admitted anytime over the weekend if approved. SANDRA will page Anna to set up admit if approval comes this weekend or plan for Monday morning.     SANDRA to follow and assist with any other discharge needs that may arise.  SALLY Thrasher   5A beds:5220-40  5C beds 5417-32 (no BMT pt's)     Phone: [phone]  Pager: [phone]

## 2023-12-03 NOTE — PROGRESS NOTES
[facility]    Medicine Progress Note - Hospitalist Service, GOLD TEAM 1    Date of Admission:  11/27/2023    Assessment & Plan   Emigdio Hannah is a 89 year old male with no past medical history, who was admitted to [facility] 11/27/2023 for further evaluation of generalized weakness and jaundice in the setting of recently found pancreatic mass.    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 He states his goals is to live to 89 and he has concerns regarding his wife and children's ability to care for themselves if something were to happen to him. 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 Verbalizes understanding of the concerns of his prognosis, but would like to attempt to rehab at TCU enough to get home. Agrees with DNR/DNI code status. Family was notified of medicine teams concerns and also verbalized understanding. The hospital was contacted by family friend (patient's daughter's coworkers) on 12/2 regarding concern for this family. Long discussions were had with this person by the primary team on 12/3. Social work assisting.   - Defer further discussions of hospice to TCU provider (pending progression in therapies) and PCP  - Social work assisting with family    - Patient's daughter has two coworkers who are very invested in the care of this family. Their contact is below. No PHI to be shared with them unless approval from patient  - Ellen Escobedo [phone]     - Bette [phone]      Bilateral LE Edema (R>L)  Severe Eczema craquele  Acute occlusive superficial thrombus of left  gastrocnemius vein  Patient presented with bilateral lower extremity edema and scattered wounds throughout LE. US on admission showed acute superficial clot of left gastrocnemius vein. Initially started on heparin drip, but this was discontinued and Vitamin K administered due to procedure below. Discussed case with hematology who reports that there are disagreements regarding whether or not gastrocnemius is a deep or superficial vein, but likely no indication for treatment in this patient (especially in his age and likely higher risk at falls thus bleeds). Etiology of bilateral LE edema likely due to new diagnosis of HFrEF. Appears ED was initially concerned for cellulitis and patient was started on antibiotics. WOCN saw patient and recommended derm consultation. IV antibiotics were discontinued and patient has done well off of them. Derm reviewed images and stated it appeared to be severe eczema craquele and patient and started on steroid emolient.    - WOCN following  - Derm following   - Should repeat LE US in 1-2 weeks, or sooner if indicated, to evaluate for clot propagation   - At this time, if felt needed/indicated, could start anticoagulation    Deconditioning  Per family, patient up walking with cane PTA. Drives, does yard work and requires very minimal assistance. On admission, requiring heavy assist of two to use commode. PT consulted and recommended TCU on discharge. Social work working on dispo.   - PT/OT following  - Social work following    ERIC, resolved  Urinary Retention  Likely BPH  Multiple imaging studies showed distended bladder and patient reported difficulty initiating urination likely consistent with BPH. On admission, creatinine elevated to 2.93 from baseline of ~1. Suspect likely multifactorial related to poor oral intake and history/imaging consistent with BPH. A torres catheter was placed on admission and patient received IV fluids with noted improving creatinine. Electing to start  "tamsulosin for probable BPH. Trial of straight cathing was performed, and patient unable to void without assistance. Indwelling torres catheter was placed again, and will be present on discharge. He will need follow up with urology on discharge.   - Continue indwelling torres  - Continue tamsulosin (at night)  - Follow creatinine  - Patient will need follow up with urology on discharge    Bradycardia, resolved  Troponinemia, stable  Hypophosphatemia, resolved (**please note this was incorrectly noted as hypomagnesemia in prior notes)  Patient with bradycardia event 11/30 prior to PT/OT, following \"large bowel movement.\" HR dropped to 40's. Stat EKG ordered and rapid called. EKG without significant change from prior. Labs showed hypophosphatemia and elevated troponin. Repeat troponin with no sig change--likely baseline in age group/situation. Unclear exact etiology behind bradycardia, question if vasovagal event versus hypophosphatemia. Recovered to prior heart rate without issue. Discussed code status with family multiple times on 11/30,12/1 and eventually elected to changed to DNR/DNI. Of note, was found to have multiple PVC's on tele. No symptoms. ECG confirmed. No further work-up at that time.   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 Showed decreased EF of 42% and valvular abnormalities as noted above. No prior for comparison. Edema as noted above, but otherwise asymptomatic. Weight has been stable. At this time, given his age, borderline low blood pressures, bradycardic event as noted above, would not start any treatment.   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 Work-up at that time showed total bilirubin of 18.1, alk phos of 1.074 and CT scan showed large cystic mass along CBD. Presented to [facility] 11/27 with weakness, and bilateral lower extremity edema. On admission, repeat imaging showed stable pancreatic mass and actually improved bilirubin to 3.6 and alk phos to 454.  Also noted to have leukocytosis to 14.3. Ceftriaxone and vancomycin were initiated due to concern for intraabdominal infection. GI was consulted and patient underwent ERCP 11/28, which showed findings consistent with IPMN vs pseudocyst. Given age, elected to proceed with draining without sampling. Stent was placed and will be left in indefinitely. Patient tolerated procedure well and was able to advance diet appropriately. GI subsequently signed off. Liver enzymes and t bili have continued to improve. Patient feeling well. At this time, no further monitoring indicated.   - Check hepatic panel, cbc PRN    Hard of hearing  Patient very hard of hearing despite hearing aid in place. Difficult to communicate and determine how much of situation patient understands. Initially, his responses were intermittently appropriate and inappropriate.  Using pocket talker and patient seems to have better understanding of situation.   - Noted    Constipation Continue daily MiraLax.           Diet: Regular Diet Adult    DVT Prophylaxis: VTE Prophylaxis contraindicated due to cellulitis and recent procedure  Sandoval Catheter: PRESENT, indication: Retention, Retention  Lines: None     Cardiac Monitoring: None  Code Status: No CPR- Do NOT Intubate      Clinically Significant Risk Factors              # Hypoalbuminemia: Lowest albumin = 2.3 g/dL at 12/2/2023  4:36 AM, will monitor as appropriate                  # Financial/Environmental Concerns: none         Disposition Plan      Expected Discharge Date: 12/04/2023        Discharge Comments: SANDRA working on TCU          The  patient's care was discussed with the Attending Physician, Dr. Wong, Bedside Nurse, PT/OT, social work, Patient, and family friend (patient coworker).     Luci Altamirano PA-C  Hospitalist Service, GOLD TEAM [address] [place] [address]  Securely message with [employer] (more info)  Text page via [facility] Paging/Directory   See signed in provider for up to date coverage information  ______________________________________________________________________    Interval History   Patient feeling well. Eating breakfast. No acute complaints. Ready to go to rehab.    Long discussions had with social work and daughter's coworker/friend, Ellen. Per Ellen, the patient does majority if not all of the cares for his three adult family members. They expressed significant concern for what they would do if the patient were unable to return home. States that patient's children likely have autism spectrum disorder, and that there are concerns that wife may have dementia.    Physical Exam   Vital Signs: Temp: 98.3  F (36.8  C) Temp src: Axillary BP: 117/65 Pulse: 60   Resp: 18 SpO2: 96 % O2 Device: None (Room air)    Weight: 127 lbs 13.87 oz    General Appearance: Comfortable, nontoxic appearing male seen sitting in chair, eating breakfast.   Eyes: PERRLA. No conjunctival icterus.  HEENT: Atraumatic.  Respiratory: Breathing comfortably on room air.    Musculoskeletal: Moving all extremities spontaneously.  Neurologic: Cranial nerves II through XII grossly intact.  Psychiatric: Mood appropriate.    Medical Decision Making     75 MINUTES SPENT BY ME on the date of service doing chart review, history, exam, documentation & further activities per the note.      Data   Imaging results reviewed over the past 24 hrs:   No results found for this or any previous visit (from the past 24 hour(s)).    Recent Labs   Lab 12/03/23  0526 12/02/23  0436 12/01/23  0453 11/30/23  1153 11/30/23  0607 11/29/23  0638  11/29/23  0623 11/28/23  1234 11/28/23  0632 11/27/23  1533   WBC  --   --   --   --  11.1*  --  12.6* 12.4* 12.0* 14.3*   HGB  --   --   --   --  8.8*  --  8.7* 9.8* 10.4* 11.1*   MCV  --   --   --   --  97  --  93 94 95 94     --   --   --  154  154  --  159 181 181 211   INR  --   --   --   --   --   --   --  1.31* 1.28*  --     141 142   < >  --    < > 142 139 136 136   POTASSIUM 4.2 3.7 3.8   < >  --    < > 4.2 4.3 4.8 5.0   CHLORIDE 105 107 108*   < >  --    < > 106 102 100 98   CO2 25 28 27   < >  --    < > 26 26 24 23   BUN 23.9* 22.8 25.3*   < >  --    < > 44.0* 63.6* 71.5* 66.6*   CR 0.80 0.87 0.90   < >  --    < > 1.37* 2.36* 2.93* 2.83*   ANIONGAP 9 6* 7   < >  --    < > 10 11 12 15   YG 7.7* 7.6* 7.8*   < >  --    < > 8.5 9.1 9.4 9.7*   * 103* 113*   < >  --    < > 109* 111* 126* 144*   ALBUMIN 2.4* 2.3* 2.3*  --   --    < > 2.6* 3.2* 3.4* 4.0   PROTTOTAL 5.2* 4.6* 4.9*  --   --    < > 4.8* 6.3* 6.6 8.0   BILITOTAL 2.5* 2.6* 2.8*  --   --    < > 3.7* 4.6* 4.9* 6.4*   ALKPHOS 220* 197* 242*  --   --    < > 261* 325* 348* 454*   ALT 30 30 34  --   --    < > 44 49 55 73*   AST 30 28 35  --   --    < > 27 28 30 44   LIPASE  --   --   --   --   --   --   --  37  --  43    < > = values in this interval not displayed.

## 2023-12-03 NOTE — PLAN OF CARE
"/68 (BP Location: Right arm)   Pulse 80   Temp 98.4  F (36.9  C) (Oral)   Resp 18   Wt 58 kg (127 lb 13.9 oz)   SpO2 99%   BMI 20.03 kg/m      VSS on room air. Patient complains of right lower extremity pain particularly at right heel  when palpated; denies pain at rest. Q2-3hr turns. Bilateral leg dressing done in AM. Bed bath completed; linens changed. No phosphorous replacement needed this shift per protocol. Continue with plan of care; will notify provider with any changes.      Goal Outcome Evaluation:      Plan of Care Reviewed With: patient    Overall Patient Progress: improving    Problem: Adult Inpatient Plan of Care  Goal: Plan of Care Review  Description: The Plan of Care Review/Shift note should be completed every shift.  The Outcome Evaluation is a brief statement about your assessment that the patient is improving, declining, or no change.  This information will be displayed automatically on your shift  note.  Outcome: Progressing  Flowsheets (Taken 12/3/2023 1624)  Plan of Care Reviewed With: patient  Overall Patient Progress: improving  Goal: Patient-Specific Goal (Individualized)  Description: You can add care plan individualizations to a care plan. Examples of Individualization might be:  \"Parent requests to be called daily at 9am for status\", \"I have a hard time hearing out of my right ear\", or \"Do not touch me to wake me up as it startles  me\".  Outcome: Progressing  Goal: Absence of Hospital-Acquired Illness or Injury  Outcome: Progressing  Intervention: Identify and Manage Fall Risk  Recent Flowsheet Documentation  Taken 12/3/2023 1530 by Rebecca Holley RN  Safety Promotion/Fall Prevention:   clutter free environment maintained   increase visualization of patient  Taken 12/3/2023 1230 by Rebecca Holley RN  Safety Promotion/Fall Prevention:   clutter free environment maintained   increase visualization of patient  Taken 12/3/2023 0830 by Rebecca Holley, VAL  Safety " Promotion/Fall Prevention:   clutter free environment maintained   increase visualization of patient  Intervention: Prevent Skin Injury  Recent Flowsheet Documentation  Taken 12/3/2023 1530 by Rebecca Holley RN  Body Position:   turned   legs elevated   heels elevated  Taken 12/3/2023 1330 by Rebecca Holley RN  Body Position:   turned   legs elevated   heels elevated  Taken 12/3/2023 1130 by Rebecca Holley RN  Body Position:   turned   legs elevated   heels elevated  Taken 12/3/2023 0830 by Rebecca Holley RN  Body Position:   turned   legs elevated   heels elevated  Skin Protection: incontinence pads utilized  Device Skin Pressure Protection: positioning supports utilized  Intervention: Prevent and Manage VTE (Venous Thromboembolism) Risk  Recent Flowsheet Documentation  Taken 12/3/2023 0830 by Rebecca Holley RN  VTE Prevention/Management: SCDs (sequential compression devices) off  Intervention: Prevent Infection  Recent Flowsheet Documentation  Taken 12/3/2023 1530 by Rebecca Holley RN  Infection Prevention:   rest/sleep promoted   hand hygiene promoted  Taken 12/3/2023 1230 by Rebecca Holley RN  Infection Prevention:   rest/sleep promoted   hand hygiene promoted  Taken 12/3/2023 0830 by Rebecca Holley RN  Infection Prevention:   rest/sleep promoted   hand hygiene promoted  Goal: Optimal Comfort and Wellbeing  Outcome: Progressing  Goal: Readiness for Transition of Care  Outcome: Progressing     Problem: Fall Injury Risk  Goal: Absence of Fall and Fall-Related Injury  Outcome: Progressing  Intervention: Identify and Manage Contributors  Recent Flowsheet Documentation  Taken 12/3/2023 1530 by Rebecca Holley RN  Medication Review/Management: medications reviewed  Taken 12/3/2023 1230 by Rebecca Holley RN  Medication Review/Management: medications reviewed  Taken 12/3/2023 0830 by Rebecca Holley RN  Medication Review/Management: medications reviewed  Intervention: Promote  Injury-Free Environment  Recent Flowsheet Documentation  Taken 12/3/2023 1530 by Rebecca Holley, RN  Safety Promotion/Fall Prevention:   clutter free environment maintained   increase visualization of patient  Taken 12/3/2023 1230 by Rebecca Holley, RN  Safety Promotion/Fall Prevention:   clutter free environment maintained   increase visualization of patient  Taken 12/3/2023 0830 by Rebecca Holley, RN  Safety Promotion/Fall Prevention:   clutter free environment maintained   increase visualization of patient

## 2023-12-03 NOTE — CONSULTS
Care Management Follow Up    Length of Stay (days): 6    Expected Discharge Date: 12/04/2023     Concerns to be Addressed:  home safety  Patient plan of care discussed at interdisciplinary rounds: Yes    Anticipated Discharge Disposition: Transitional Care  Disposition Comments: n/a  Anticipated Discharge Services: Transportation Services  Anticipated Discharge DME: None    Patient/family educated on Medicare website which has current facility and service quality ratings: yes  Education Provided on the Discharge Plan: Yes  Patient/Family in Agreement with the Plan: yes    Referrals Placed by CM/SW:  (4 TCU)  Private pay costs discussed: Not applicable    Additional Information:  SANDRA reviewed the consult form ANS. Pt's, Emigdio,  family was driven to hospital by pt's daughter's, Krystle, co-workers Tito. Bette and Krystle reported to ANS their concerns for the pt's wife, son, and daughter being alone.   Their concerns are pt's children are developmentally delayed and pt's wife has un-documented dementia and they are at the home with out a caretaker.   Emigdio, is the main caretaker of the family. He is currently in the hospital and will be going to transitional care for rehab. Pt's NICOLE has also talked with Emigdio about hospice.     SANDRA left messages with pt's co-workers and said that can  report this info to [place] [facility] ([facility] line. SANDRA left the number [phone]    Emigdio told NICOLE that the wants to get home so that he can work on a plan for his family.   When SANDRA talked with Emigdio today he told SW that his daughter works and gets home by 5:30 pm and his son leaves for work at 2:30 pm. This leaves over 2 hours that his wife is alone. Emigdio does have concerns about his wife being home alone. He didn't have any concerns over his kids being alone with their mother.    SANDRA called [place] [facility] ([facility] line and went through the above information and was told to make a report on the  concerns pt had for his wife being alone.   SW made a vunerable adult report for wife because she is alone for 2.5 hours durign the week and pt is concerned for her safety when no one is there. Report #[id number]    When SANDRA asked Emigdio if he would like SW to call the [facility] for assistance. He said it would be nice to have someone to come out and talk about future plans and to assist the family now.     Pt said he is a  that served 25 yrs and in World War 2.Emigdio said he also has insurance through the [facility] and [facility] one.. SANDRA will need to follow up with the [facility], [facility], on Monday to find out if they could assist pt and his family.     SANDRA also let Emigdio know that she hasn't heard from insurance for the prior autherization, but Sw will update him as soon as it comes in.      SW to follow and assist with any other discharge needs that may arise.  Lashawn Elizondo. LSW  5 A & 5C weekend   Units: 5A & 5C Pager: [phone]    Social Work and Care Management Department    SEARCHABLE in [employer] - search SOCIAL WORK  [place] (0800 - 1630) Saturday and Sunday  Units: 4A, 4C, & 4E Pager: [phone]  Units: 5A & 5C Pager: [phone]  Units: 6A & 6B   Pager: [phone]  Units: 6C & 6D Pager: [phone]  Units: 7A & 7B  Pager: [phone]  Units: 7C Pager: [phone]  Unit: [place] ED Pager: [phone]     [facility] [place] (1973-2337) Saturday and Sunday    Units: 5 Ortho, 5 Med/Surg & WB ED  Pager:[phone]  Units: 6 Med/Surg, 8A, & 10A ICU  Pager: [phone]    After hours (1630 - 0000) Saturday & Sunday; (8486-8193) Mon-Fri; (7031-2304) [facility] Recognized Holidays  Units: ALL  Pager: [phone]

## 2023-12-03 NOTE — PLAN OF CARE
"/73   Pulse 74   Temp 99.3  F (37.4  C) (Oral)   Resp 18   Wt 58 kg (127 lb 13.9 oz)   SpO2 96%   BMI 20.03 kg/m      VSS on room air. Patient complains of right lower extremity pain particularly at right heel  when palpated; denies pain at rest. Q2-3hr turns. Bilateral leg dressing done in AM. Bed bath completed; linens changed. Oral phosphorous replaced; recheck next AM per protocol. Continue with plan of care; will notify provider with any changes.       Goal Outcome Evaluation:      Plan of Care Reviewed With: patient    Overall Patient Progress: improving      Problem: Adult Inpatient Plan of Care  Goal: Plan of Care Review  Description: The Plan of Care Review/Shift note should be completed every shift.  The Outcome Evaluation is a brief statement about your assessment that the patient is improving, declining, or no change.  This information will be displayed automatically on your shift  note.  12/2/2023 1842 by Rebecca Holley RN  Outcome: Progressing  Flowsheets (Taken 12/2/2023 1842)  Plan of Care Reviewed With:   patient   caregiver   family  12/2/2023 1842 by Rebecca Holley RN  Outcome: Progressing  Flowsheets (Taken 12/2/2023 1842)  Plan of Care Reviewed With:   patient   caregiver   family  Goal: Patient-Specific Goal (Individualized)  Description: You can add care plan individualizations to a care plan. Examples of Individualization might be:  \"Parent requests to be called daily at 9am for status\", \"I have a hard time hearing out of my right ear\", or \"Do not touch me to wake me up as it startles  me\".  12/2/2023 1842 by Rebecca Holley RN  Outcome: Progressing  12/2/2023 1842 by Rebecca Holley RN  Outcome: Progressing  Goal: Absence of Hospital-Acquired Illness or Injury  12/2/2023 1842 by Rebecca Holley RN  Outcome: Progressing  12/2/2023 1842 by Rebecca Holley RN  Outcome: Progressing  Intervention: Identify and Manage Fall Risk  Recent Flowsheet Documentation  Taken " 12/2/2023 1530 by Rebecca Holley RN  Safety Promotion/Fall Prevention:   clutter free environment maintained   increase visualization of patient  Taken 12/2/2023 1230 by Rebecca Holley RN  Safety Promotion/Fall Prevention:   clutter free environment maintained   increase visualization of patient  Taken 12/2/2023 0830 by Rebecca Holley RN  Safety Promotion/Fall Prevention:   clutter free environment maintained   increase visualization of patient  Intervention: Prevent Skin Injury  Recent Flowsheet Documentation  Taken 12/2/2023 1500 by Rebecca Holley RN  Body Position:   turned   heels elevated   legs elevated  Taken 12/2/2023 1430 by Rebecca Holley RN  Body Position:   turned   heels elevated   legs elevated  Taken 12/2/2023 1230 by Rebecca Holley RN  Body Position:   turned   heels elevated   legs elevated  Taken 12/2/2023 1030 by Rebecca Holley RN  Body Position:   turned   heels elevated   legs elevated  Taken 12/2/2023 0830 by Rebecca Holley RN  Body Position:   turned   heels elevated   legs elevated  Skin Protection: incontinence pads utilized  Device Skin Pressure Protection: positioning supports utilized  Intervention: Prevent and Manage VTE (Venous Thromboembolism) Risk  Recent Flowsheet Documentation  Taken 12/2/2023 0830 by Rebecca Holley RN  VTE Prevention/Management: SCDs (sequential compression devices) off  Intervention: Prevent Infection  Recent Flowsheet Documentation  Taken 12/2/2023 1530 by Rebecca Holley RN  Infection Prevention:   rest/sleep promoted   hand hygiene promoted  Taken 12/2/2023 1230 by Rebecca Holley RN  Infection Prevention:   rest/sleep promoted   hand hygiene promoted  Taken 12/2/2023 0830 by Rebecca Holley RN  Infection Prevention:   rest/sleep promoted   hand hygiene promoted  Goal: Optimal Comfort and Wellbeing  12/2/2023 1842 by Rebecca Holley RN  Outcome: Progressing  12/2/2023 1842 by Rebecca Holley RN  Outcome:  Progressing  Goal: Readiness for Transition of Care  12/2/2023 1842 by Rebecca Holley RN  Outcome: Progressing  12/2/2023 1842 by Rebecca Holley RN  Outcome: Progressing     Problem: Fall Injury Risk  Goal: Absence of Fall and Fall-Related Injury  12/2/2023 1842 by Rebecca Holley RN  Outcome: Progressing  12/2/2023 1842 by Rebecca Holley RN  Outcome: Progressing  Intervention: Identify and Manage Contributors  Recent Flowsheet Documentation  Taken 12/2/2023 1530 by Rebecca Holley RN  Medication Review/Management: medications reviewed  Taken 12/2/2023 1230 by Rebecca Holley RN  Medication Review/Management: medications reviewed  Taken 12/2/2023 0830 by Rebecca Holley RN  Medication Review/Management: medications reviewed  Intervention: Promote Injury-Free Environment  Recent Flowsheet Documentation  Taken 12/2/2023 1530 by Rebecca Holley RN  Safety Promotion/Fall Prevention:   clutter free environment maintained   increase visualization of patient  Taken 12/2/2023 1230 by Rebecca Holley RN  Safety Promotion/Fall Prevention:   clutter free environment maintained   increase visualization of patient  Taken 12/2/2023 0830 by Rebecca Holley RN  Safety Promotion/Fall Prevention:   clutter free environment maintained   increase visualization of patient

## 2023-12-03 NOTE — PLAN OF CARE
/78 (BP Location: Right arm, Cuff Size: Adult Small)   Pulse 80   Temp 97.2  F (36.2  C) (Oral)   Resp 20   Wt 58 kg (127 lb 13.9 oz)   SpO2 97%   BMI 20.03 kg/m      VSS on RA. Irregular HR noted on assessment, placed on telemetry for an hour. SR with quadrigeminal PVC's. Taken off tele. Patient denies pain except when moving legs. Bilateral leg dressings clean, dry, and intact. Maintained legs/heels elevated on pillows overnight. Turned Q2 hours. Preventative mepilex on sacrum. No phosphorus replacement needed this am. Continue with plan of care, awaiting transfer to TCU.    Problem: Adult Inpatient Plan of Care  Goal: Readiness for Transition of Care  Outcome: Progressing      Anthony Berrios RN on 12/3/2023 at 6:38 AM

## 2023-12-04 NOTE — PROGRESS NOTES
Care Management Discharge Note    Discharge Date: 12/04/2023     Discharge Disposition: Transitional Care    Discharge Services: Therapy, Transportation Services    Discharge DME: None    Discharge Transportation: [facility] w/c ride    Private pay costs discussed: Not applicable    Does the patient's insurance plan have a 3 day qualifying hospital stay waiver?  Yes     Which insurance plan 3 day waiver is available? Alternative insurance waiver    Will the waiver be used for post-acute placement? Undetermined at this time    PAS Confirmation Code: [id number]  Patient/family educated on Medicare website which has current facility and service quality ratings: yes    Education Provided on the Discharge Plan: Yes  Persons Notified of Discharge Plans: Pt, Care team, TCU  Patient/Family in Agreement with the Plan: yes    Handoff Referral Completed: Yes    Additional Information:  Pt agreed to TCU discharge plan. [facility] w/c ride scheduled tonight between 7:8 - 7:53 PM. Discharge orders and code status faxed to receiving facility. No further concern at this time.    [facility] [facility] [address]   [phone]   [phone]  Nurse Report Line: [phone]      Yashira Johnson, 5A Oncology & 5C non BMT [facility]  P: [phone]  Pager: [phone]  F: [phone]  Weekend & [facility] Recognized Holidays Pager: [phone]  Weekend Coverage: 5A & 5C

## 2023-12-04 NOTE — PLAN OF CARE
"/76 (BP Location: Right arm)   Pulse 91   Temp 97.4  F (36.3  C) (Oral)   Resp 16   Wt 58 kg (127 lb 13.9 oz)   SpO2 99%   BMI 20.03 kg/m    VSS, AOx4, very [place], Pt has a ride set up to transport to TCU (see [facility] Worker note).  AVS printed, IATF printed and in envelope at the desk, facesheet printed as well.  PIV removed.  Continue POC until discharge tonight.   Problem: Adult Inpatient Plan of Care  Goal: Plan of Care Review  Description: The Plan of Care Review/Shift note should be completed every shift.  The Outcome Evaluation is a brief statement about your assessment that the patient is improving, declining, or no change.  This information will be displayed automatically on your shift  note.  Outcome: Progressing  Flowsheets (Taken 12/4/2023 1750)  Plan of Care Reviewed With: patient  Overall Patient Progress: improving  Goal: Patient-Specific Goal (Individualized)  Description: You can add care plan individualizations to a care plan. Examples of Individualization might be:  \"Parent requests to be called daily at 9am for status\", \"I have a hard time hearing out of my right ear\", or \"Do not touch me to wake me up as it startles  me\".  Outcome: Progressing  Goal: Absence of Hospital-Acquired Illness or Injury  Outcome: Progressing  Intervention: Identify and Manage Fall Risk  Recent Flowsheet Documentation  Taken 12/4/2023 0800 by Robert Genao RN  Safety Promotion/Fall Prevention: safety round/check completed  Intervention: Prevent Skin Injury  Recent Flowsheet Documentation  Taken 12/4/2023 0800 by Robert Genao RN  Body Position:   turned   left   legs elevated  Intervention: Prevent and Manage VTE (Venous Thromboembolism) Risk  Recent Flowsheet Documentation  Taken 12/4/2023 0800 by Robert Genao RN  VTE Prevention/Management: SCDs (sequential compression devices) off  Intervention: Prevent Infection  Recent Flowsheet Documentation  Taken 12/4/2023 0800 by Birgit" Robert, RN  Infection Prevention:   rest/sleep promoted   hand hygiene promoted  Goal: Optimal Comfort and Wellbeing  Outcome: Progressing     Problem: Fall Injury Risk  Goal: Absence of Fall and Fall-Related Injury  Outcome: Progressing  Intervention: Promote Injury-Free Environment  Recent Flowsheet Documentation  Taken 12/4/2023 0800 by Robert Genao, RN  Safety Promotion/Fall Prevention: safety round/check completed   Goal Outcome Evaluation:      Plan of Care Reviewed With: patient    Overall Patient Progress: improvingOverall Patient Progress: improving

## 2023-12-04 NOTE — DISCHARGE SUMMARY
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   Cholelithiasis  Leukocytosis, resolved  Elevated alk phos  Elevated total bilirubin, improving  Bilateral lower extremity edema  Severe eczema craquele  Acute occlusive superficial thrombus of left gastrocnemius vein   Deconditioning   ERIC, resolved, urinary retention   Likely BPH  Bradycardia , resolved   Troponinemia, stable  Hypophosphatemia, resolved   HFrEF  Mild aortic insufficiency   Moderate to severe tricuspid insufficiency   Mild mitral insufficiency   Hard of hearing  Constipation     Clinically Significant Risk Factors          Follow-ups Needed After Discharge   Follow-up Appointments     Follow Up ([facility])      Follow up with primary care provider, Mark Baker, within 7 days to   evaluate medication change, for hospital follow- up, regarding new   diagnosis, and to discuss prognosis.      Follow up with Urology in 1-2 weeks for discussion regarding torres   catheter.    Appointments on [place] and/or [facility] (with [facility] or [facility]   provider or service). Call [phone] if you haven't heard regarding   these appointments within 7 days of discharge.        Follow Up and recommended labs and tests      Follow up with half-way physician.  The following labs/tests are   recommended: Per discretion of provider.  Follow up with specialist, Urology, in 1-2 weeks to discuss torres   catheter.            Unresulted Labs Ordered in the Past 30 Days of this Admission       No orders found from 10/28/2023 to 11/28/2023.            Discharge Disposition   Discharged to home  TCU  Condition at discharge: Stable    Hospital Course   Emigdio  Camden is a 89 year old male with no past medical history, who was admitted to [facility] 11/27/2023 for further evaluation of generalized weakness and jaundice in the setting of recently found pancreatic mass. GI consulted and patient underwent EUS with stent placement with the plan to leave in place indefinitely. Hospitalization also complicated by bilateral edema. TTE with underlying HFrEF which is likely underlying cause for edema. In light of new heart failure diagnosis and pancreatic mass finding, Goals of care conversation held with primary medicine team. Patient vocalized wishing to transition code status to DNR/DNI, but wishes to pursue further discussion outside of the hospital in regard to hospice. Patient wished to discharge to TCU to attempt further recovering prior to discharge to home.      Goals of Care  Long discussion had with patient regarding goals of care and life expectancy in the setting of new diagnosis of HFrEF and question of pancreatic cancer. He states his goals is to live to 89 and he has concerns regarding his wife and children's ability to care for themselves if something were to happen to him.  Verbalizes understanding of the concerns of his prognosis, but would like to attempt to rehab at TCU enough to get home. Agrees with DNR/DNI code status. Will defer further discussions of hospice to TCU provider (pending progression in therapies) and PCP. Offered to patient option to meet with Palliative Care while in hospital here, but would like to defer to considering this as outpatient. He would like to get back home to family before having these conversations.      Bilateral LE Edema (R>L)  Bilateral stasis dermatitis   Severe Eczema craquele  Acute occlusive superficial thrombus of left gastrocnemius vein  Patient presented with bilateral lower extremity edema and scattered wounds throughout LE. US on admission showed acute superficial clot of left gastrocnemius vein. Initially started on  heparin drip, but this was discontinued and Vitamin K administered due to procedure below. Discussed case with hematology who reported that there are disagreements regarding whether or not gastrocnemius is a deep or superficial vein, but likely no indication for treatment in this patient (especially in his age and likely higher risk at falls thus bleeds). Etiology of bilateral LE edema likely due to new diagnosis of HFrEF. Appears ED was initially concerned for cellulitis and patient was started on antibiotics. WOCN and derm saw patient. Patient was started on wound care regimen and steroid emollient. Discussed with Dermatology and wound RN and recommended continued wound care as outlined in AVS by wound RN and Dermatology recommended triamcinolone 0.025% ointment PRN for severe itching, not to be used daily and only for symptom management as they suspect improvement with management of edema given presentation consistent with stasis dermatitis. Recommended OP follow up with Dermatology in 3 months, Dermatology reached out to schedulers. On discharge, patient should have repeat LE US in 1-2 weeks to evaluate clot.       Deconditioning  Prior to admission was very mobile and active (drives, does yard work and requires very minimal assistance). On admission, required heavy assist of two to use commode. PT/oT consulted and recommended TCU on discharge. Continue to work with therapies on discharge.      Urinary Retention  Likely BPH  ERIC, resolved  Multiple imaging studies showed distended bladder and patient reported difficulty initiating urination likely consistent with BPH. On admission, creatinine elevated to 2.93 from baseline of ~1. ERIC was likely multifactorial related to poor oral intake and history/imaging consistent with BPH. A torres catheter was placed on admission and patient received IV fluids with noted improving creatinine. Started tamsulosin for probable BPH. Trial of straight cathing was performed, and  "patient unable to void without assistance. Indwelling torres catheter was placed again, and will be present on discharge. He will continue tamsulosin and will need follow up with urology on discharge.      Bradycardia, resolved  Troponinemia, stable  Hypophosphatemia, improving   Patient with bradycardia event 11/30 prior to PT/OT, following \"large bowel movement.\" HR dropped to 40's. Stat EKG ordered and rapid called. EKG without significant change from prior. Labs showed hypophosphatemia and elevated troponin. Repeat troponin with no sig change--likely baseline in age group/situation. Unclear exact etiology behind bradycardia, question if vasovagal event versus hypophosphatemia. Recovered to prior heart rate without issue. Discussed code status with family multiple times on 11/30,12/1 and eventually elected to changed to DNR/DNI. Of note, was found to have multiple PVC's on tele. No symptoms. ECG confirmed. No further work-up at that time.      HFrEF  Mild Aortic Insufficiency  Moderate to Severe Tricuspid Insufficiency  Mild mitral insufficiency  On admission, echo done in the setting of LE edema as noted below. Showed decreased EF of 42% and valvular abnormalities as noted above. No prior for comparison. Edema as noted above, but otherwise asymptomatic. Weight has been stable. At this time, given his age, borderline low blood pressures, bradycardic event as noted above, would not start any treatment.   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 Work-up at that time showed total bilirubin of 18.1, alk phos of 1.074 and CT scan showed large cystic mass along CBD. Presented to [facility] 11/27 with weakness, and bilateral lower " extremity edema. On admission, repeat imaging showed stable pancreatic mass and actually improved bilirubin to 3.6 and alk phos to 454.  Also noted to have leukocytosis to 14.3. Ceftriaxone and vancomycin were initiated due to concern for intraabdominal infection. GI was consulted and patient underwent ERCP 11/28, which showed findings consistent with IPMN vs pseudocyst. Given age, elected to proceed with draining without sampling. Stent was placed and will be left in indefinitely. Patient tolerated procedure well and was able to advance diet appropriately. GI subsequently signed off. Liver enzymes and t bili have continued to improve and patient felt well. Discussion of prognosis noted above.      Constipation Started on MiraLax BID during hospitalization, which should be continued on discharge (held for diarrhea).        Consultations This Hospital Stay   PHARMACY TO DOSE VANCO  GI PANCREATICOBILIARY ADULT IP CONSULT  PHYSICAL THERAPY ADULT IP CONSULT  OCCUPATIONAL THERAPY ADULT IP CONSULT  PHARMACY TO DOSE VANCO  WOUND OSTOMY CONTINENCE NURSE  IP CONSULT  DERMATOLOGY IP CONSULT  SOCIAL WORK IP CONSULT  CARE MANAGEMENT / SOCIAL WORK IP CONSULT  WOUND OSTOMY CONTINENCE NURSE  IP CONSULT  SOCIAL WORK IP CONSULT  PHYSICAL THERAPY ADULT IP CONSULT  OCCUPATIONAL THERAPY ADULT IP CONSULT  NURSING TO CONSULT FOR VASCULAR ACCESS CARE IP CONSULT    Code Status   No CPR- Do NOT Intubate    Time Spent on this Encounter   I, Alysa Dao PA-C, personally saw the patient today and spent greater than 30 minutes discharging this patient.       Alysa Dao PA-C  [facility] [address] [place] [id number]  Phone: [phone]  Fax: [phone]  ______________________________________________________________________    Physical Exam   Vital Signs: Temp: 97.4  F (36.3  C) Temp src: Oral BP: 127/85 Pulse: 51   Resp: 16 SpO2: 99 % O2 Device: None (Room air)    Weight: 127 lbs  13.87 oz  General Appearance: Sitting up in chair in no acute distress, cooperative, alert, awake, frail, thin appearing    HEENT: hard of hearing, normocephalic, atraumatic, MMM  Respiratory: breathing comfortably on RA, no wheezing, crackles, or rhonchi  Cardiovascular: RRR nl S1/S2 without murmurs, clicks, rubs or gallops, dressing and compression stockings in place on bilateral lower extremities  GI: soft, non-distended, normoactive bowel sounds, non-tender per palpation   Skin: dressings in place on bilateral lower extremities which are c/d/I, no other rashes or lesions on uncovered surfaces        Primary Care Physician   Mark Baker    Discharge Orders      US Lower Extremity Venous Duplex Left     Adult Urology  Referral      Follow Up ([facility])    Follow up with primary care provider, Mark Baker, within 7 days to evaluate medication change, for hospital follow- up, regarding new diagnosis, and to discuss prognosis.      Follow up with Urology in 1-2 weeks for discussion regarding torres catheter.    Appointments on [place] and/or [facility] (with [facility] or [facility] provider or service). Call [phone] if you haven't heard regarding these appointments within 7 days of discharge.     General info for SNF    Length of Stay Estimate: Short Term Care: Estimated # of Days <30  Condition at Discharge: Improving  Level of care:skilled   Rehabilitation Potential: Fair  Admission H&P remains valid and up-to-date: Yes  Recent Chemotherapy: N/A  Use Nursing Home Standing Orders: Yes     Mantoux instructions    Give two-step Mantoux (PPD) Per Facility Policy Yes     Follow Up and recommended labs and tests    Follow up with [facility]iary physician.  The following labs/tests are recommended: Per discretion of provider.  Follow up with specialist, Urology, in 1-2 weeks to discuss torres catheter.     Activity - Up with nursing assistance     Torres catheter    To straight gravity drainage. Change  catheter every 2 weeks and PRN for leaking or decreased urine output with signs of bladder distention. DO NOT change catheter without a specific Provider order IF diagnosis of benign prostatic hypertrophy (BPH), neurogenic bladder, or other urological conditions     Reason for your hospital stay    Dear Emigdio,     You were hospitalized at [facility] with weakness, lower extremity swelling and new mass on your pancreas. You underwent an ERCP and a stent was placed in a duct that goes to your gall bladder. This blockage was caused by a mass that is likely pancreatic cancer. Your leg swelling is likely the result of heart failure, which is a new diagnosis for you. You were also noted to have a blood clot in your leg that is likely superficial and does not need any treatment. The imaging of this should be repeat in 1-2 weeks as noted below. We discussed our concern that your life expectancy is less than 6-12 months in the setting of likely pancreatic cancer and heart failure. We also discussed that hospice would be appropriate, but you would like to delay at this time. You will be discharged to a TCU for further rehab and strengthening.     We are suggesting the following medication changes:  - Tamsulosin: You will take this medication every night to help with urination. Your difficulty urinating is likely due to issues with your prostate, which can happen as you age. You will also have a torres catheter in place on discharge and will follow up with urology as outpatient.    Please follow up with and/or set up an appointment with:  - Urology: You will be contacted by someone from this office for hospital follow-up. You will need to see them in ~2 weeks to evaluate need for torres catheter.  - PCP/nursing home provider: As we discussed, I am concerned that you will likely not live much longer than 6 months to a year given the high likelihood that you have pancreatic cancer, and your new  diagnosis of heart failure. I would recommend you have ongoing discussions with your family and your PCP about end of life planning and hospice.   - Repeat Ultrasound: You will need a repeat ultrasound of your left leg in ~ 1-2 weeks. You primary care provider or nursing home provider can follow-up on the results.     It was a pleasure to care for you during your hospital stay.    If you have any questions, please contact your primary care provider.    Take care,    [facility] - Internal Medicine   Hospitalist Service     Wound care    Site:   bilateral legs   Instructions:    Bilateral lower leg wound(s): Daily and PRN when soiled change ABD pads to RLE   Spray arun cleanse and protect spray onto dry wipes or hands and use to cleanse BLE and allow to air dry- ensure between toes dries  Apply small edemawear ([id number]) white netting with blue stripe. DO NOT THROW AWAY- these can be washed and hung to dry and reused for 4-6 months  Apply ABD pad and kerlix over RLE if draining- secure with tape. No need for this step if not draining  If pt c/o pain due to edemawear please remove     EdemaWear stockings: Use and Care     Rationale for use:     Decreases edema by improving lymphatic and venous function      Safe and gentle compression    Enhances wound healing    Protects skin     General:     EdemaWear can be worn 24/7, but should be removed at least daily for skin inspection and cares      In order to be effective, EdemaWear should DIRECTLY contact the skin as much as possible -- the mesh weave promotes lymphatic drainage when it is pressed into the skin    Choose appropriate size EdemaWear based on leg (or arm) circumference - see table for guidelines    EdemaWear LITE is only for especially fragile or painful skin    Cleanse and moisturize any intact or scaly skin before applying EdemaWear    Ok to apply additional compression over the EdemaWear (ie Lymph wraps)     Application:     Apply the EdemaWear  "from base of toes to knee, or above knee if tolerated and it is a long stocking    Create a wide 3\" cuff at the top if needed to prevent rolling down    Only trim the stocking if it is excessively long; do NOT cut in half; can often fold over excess length onto foot     When wounds are present:    Wound dressings that directly treat the wound bed can be applied under the EdemaWear    Any additional cover dressings (dry gauze, ABD pads, Kerlix, etc) should be applied ON TOP of the stockings whenever feasible     Stockings may get soiled with drainage and will need to be washed; ensure an extra clean, dry pair always available    May need two people to apply the stocking - bunch up stocking and pull against each other, lifting over wounds     Care:    When stockings are soiled, DO NOT THROW AWAY, hand wash with mild soap, rinse, hang dry    Replace approximately every 4 to 6 months            EdemaWear size Max circumference Stripe color PS # # stockings per pack  Small 18\"  (45cm) navy [id number] 2  Medium 30\"  (75cm) yellow [id number] 1  Large 46\"  (115cm) red [id number] 1  X Large 60\"  (150cm) aqua [id number] 1  Small LITE 24\"  (60cm) purple [id number] 2  Medium LITE 36\"  (90cm) orange 675708 1  Cheapest place to order more stockings is on [employer] website ()     No CPR- Do NOT Intubate     Physical Therapy Adult Consult    Evaluate and treat as clinically indicated.    Reason:  Deconditioning     Occupational Therapy Adult Consult    Evaluate and treat as clinically indicated.    Reason:  Deconditioning     Fall precautions     Diet    Follow this diet upon discharge: Orders Placed This Encounter      Regular Diet Adult       Significant Results and Procedures   Results for orders placed or performed during the hospital encounter of 11/27/23   US Lower Extremity Venous Duplex Bilateral    Narrative    EXAMINATION: DOPPLER VENOUS ULTRASOUND OF BILATERAL LOWER EXTREMITIES,  11/27/2023 7:14 PM     COMPARISON: " None.    HISTORY: Lower extremity swelling/pain, more notable on R.    TECHNIQUE:  Gray-scale evaluation with compression, spectral flow and  color Doppler assessment of the deep venous system of both legs from  groin to knee, and then at the ankles.    FINDINGS:  In both lower extremities, the common femoral, femoral, popliteal and  posterior tibial veins demonstrate normal compressibility with  flattened Doppler waveforms. Within the left calf there is a  gastrocnemius vein with no compression. Within the right popliteal  fossa there is a fluid collection measuring 3.2 x 1.2 x 2.6 cm. In the  left popliteal fossa there is a 4.1 x 2.4 x 4.7 cm fluid collection.  There is extensive right leg subcutaneous edema.      Impression    IMPRESSION:  1.  There is acute occlusive venous thrombosis of the left  gastrocnemius vein. No deep vein thrombosis identified. Follow-up  recommended to exclude progression of the gastrocnemius vein clot.  2.  Left greater than right subcutaneous edema.  3.  Right and left Baker cyst.  4.  Flattened bilateral waveforms may be physiologic although can be  seen with obstruction in the pelvis or higher. CT of 11/17/23 did show  markedly distended bladder. Correlate with bladder volume and consider  pelvic ultrasound.    Findings of venous thrombus of the left gastrocnemius vein discussed  with Dr. Piedra at 7:30 PM on 11/26/2023 by Dr. Seferino Martínez    Findings flattened waveforms concerning for obstruction of pelvis or  higher was discussed with Dr. Baker at 7:47 PM by Dr. Seferino Martínez.      I have personally reviewed the examination and initial interpretation  and I agree with the findings.    KHADAR SINHA MD         SYSTEM ID:  [id number]   US Abdomen Limited w Abdomen Doppler Limited    Narrative    EXAMINATION: US ABDOMEN LIMITED W ABDOMEN DOPPLER LIMITED 11/28/2023  8:41 AM     COMPARISON: CT abdomen and pelvis 11/17/2023.    HISTORY: jaundice, acholic stools,  elevated bilirubin    TECHNIQUE: The abdomen was scanned in standard fashion with  specialized ultrasound transducer(s) using both gray-scale, color  Doppler, and spectral flow techniques.    Findings:  Doppler Evaluation:   Splenic Vein: Patent with antegrade flow measuring 26 cm/s.    Portal Venous Flow:  Main Portal Vein: Patent with antegrade flow, 30 cm/sec.  Right Portal Vein: Patent with antegrade flow, 22 cm/sec.  Left Portal Vein: Patent with antegrade flow, 15 cm/sec.    Hepatic Veins:  The right, middle, and left hepatic veins are patent with flow towards  the IVC. IVC is patent.    Hepatic Arterial Flow:  Hepatic Artery: Resistive Index of 0.84, peak systolic velocity  73  cm/sec.  Right Hepatic Artery: Resistive Index of 0.76, peak systolic velocity   41 cm/sec.  Left Hepatic Artery: Not well seen.    Gray-scale Qualitative Analysis:  Liver: Hepatic parenchyma is of normal echogenicity without evidence  of focal mass. The liver measures 14.7 cm the cephalocaudal direction.  Main portal vein measures 1.1 cm and is not dilated.    Gallbladder: The gallbladder is mildly distended, there is layering  sludge within the dependent portions. The gallbladder wall is  borderline thickened measuring 3 mm. No pericholecystic fluid.    Bile Ducts: Mild intrahepatic biliary ductal dilation. No extrahepatic  biliary ductal dilation. The common bile duct measures 8 mm.    Pancreas: There are 2 rounded well-circumscribed cysts of the  pancreatic head, the larger measuring 4.2 x 3.6 x 5.2 cm with thin  septations, and the smaller measuring 0.8 x 0.7 x 0.7 cm. No solid  components within the cyst. Borderline dilation of the pancreatic duct  measuring 3 mm.    Kidneys: The right kidney measures 10.7 cm in length, and demonstrates  mildly echogenic parenchyma there is mild hydronephrosis of the right  kidney. No shadowing lithiasis, no definitive renal masses.    Aorta and IVC: The visualized portions of the aorta and IVC  are  unremarkable. See measurements above.      Impression    Impression:   1. Similar to prior CT 11/17/2023, large pancreatic head cyst  measuring up to 5.2 cm with thin septations and no solid components,  likely represents cystic neoplasm of the pancreas. Differential  diagnosis-pseudocyst. There is associated mild intrahepatic biliary  ductal dilation. The pancreatic duct is borderline enlarged measuring  3 mm.  2. Biliary sludge without definitive evidence for acute cholecystitis.  3. Unremarkable Doppler evaluation of the liver/right upper quadrant.  4. Mild hydronephrosis of the right kidney    I have personally reviewed the examination and initial interpretation  and I agree with the findings.    PARIS DOE MD         SYSTEM ID:  [id number]   CT Abdomen Pelvis w/o Contrast    Narrative    EXAMINATION: CT ABDOMEN PELVIS W/O CONTRAST, 11/28/2023 9:16 AM    INDICATION: Interval evaluation of the pancreasbiliary pathology seen  on last CT    COMPARISON STUDY: 11/17/2023    TECHNIQUE: CT scan of the abdomen and pelvis was performed on  multidetector CT scanner using volumetric acquisition technique and  images were reconstructed in multiple planes with variable thickness  and reviewed on dedicated workstations.     CONTRAST: None    CT scan radiation dose is optimized to minimum requisite dose using  automated dose modulation techniques.    FINDINGS:    Lower thorax: Moderate-sized hiatal hernia.  Basilar atelectasis.    Liver: Intrahepatic ductal dilation appears similar though was not  well characterized without contrast. No hepatic mass seen on  unenhanced CT    Biliary System: Gallbladder is distended measuring up to 5.3 cm in  transverse diameter. Layering sludge. Extrahepatic biliary dilation  appears similar comparison though evaluation is suboptimal without  contrast.    Pancreas: Cystic mass in the pancreatic head and neck measuring up to  4.8 cm is similar to previous exam when it measured up to 5 cm  in  similar dimensions. There is pancreatic ductal dilation proximal to  the cystic mass which is less well characterized on the current exam.  Some degree of atrophy in the pancreatic tail suspected.    Adrenal glands: No mass or nodules    Spleen: Normal.    Kidneys: Mild bilateral hydronephrosis is again seen which is similar  or slightly improved from comparison. The proximal ureters are  dilated. Noncontrast appearance of kidneys is otherwise unremarkable.    Gastrointestinal tract: Large volume stool in the colon.. Normal  caliber small bowel.    Mesentery/peritoneum/retroperitoneum: No mass. No free fluid or air.    Lymph nodes: No significant lymphadenopathy.    Vasculature: Scattered atherosclerotic vascular calcifications.    Pelvis: Sandoval catheter in the bladder which is now decompressed.  Diffuse bladder wall thickening from chronic outlet obstruction. Large  prostate.    Osseous structures: Degenerative changes in the hips and spine.      Soft tissues: Mild anasarca.      Impression    IMPRESSION:   1. Revisualization of cystic pancreatic head mass which is overall  similar in size. Continued dilation of the pancreatic duct and common  bile duct with severe gallbladder distention, likely related to the  mass. 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 Overall  appearance is similar to comparison though evaluation without contrast  there is suboptimal.   2. The bladder is now decompressed with Sandoval catheter in place. There  is continued at least mild bilateral hydronephrosis which is similar  or slightly improved following bladder decompression.  3. Moderate hiatal hernia.  4. Large volume stool throughout colon.    CORINA DINH MD         SYSTEM ID:  [id number]   XR Surgery FREDDIE G/T 5 Min Fluoro w Stills    Narrative    This exam was marked as non-reportable because it will not be read by a   radiologist or a [place] non-radiologist provider.         Echo Complete     Value    Biplane  LVEF 42%    [facility]    [id number]  [id number]  [id number]  [id number]^AMELIA^AARON^ANTHONY     [facility]  Echocardiography Laboratory  [address] [place] [address]     Name: RUBINA WAGNER  MRN: [id number]  : 1926  Study Date: 2023 01:20 PM  Age: 89 yrs  Gender: Male  Patient Location: [facility]  Reason For Study: Edema  Ordering Physician: AARON CISNEROS  Performed By: James Owusu RDCS     BSA: 1.7 m2  Height: 67 in  Weight: 127 lb  BP: 126/60 mmHg  ______________________________________________________________________________  Procedure  Complete Portable Echo Adult. Contrast Optison. Patient was given 6 ml mixture  of 3 ml Optison and 6 ml saline. 3 ml wasted.  ______________________________________________________________________________  Interpretation Summary  Biplane LVEF is 42%.  Mild diffuse hypokinesis is present.  Right ventricular function, chamber size, wall motion, and thickness are  normal.  Mild aortic insufficiency is present.  Moderate to severe tricuspid insufficiency is present.  The right ventricular systolic pressure is 53mmHg above the right atrial  pressure.Mild mitral insufficiency is present.  No pericardial effusion is present.  IVC diameter <2.1 cm collapsing >50% with sniff suggests a normal RA pressure  of 3 mmHg.     No prior study available for comparison.     ______________________________________________________________________________  Left Ventricle  Biplane LVEF is 42%. Left ventricular size is normal. Mild concentric wall  thickening consistent with left ventricular hypertrophy is present. Left  ventricular diastolic function is indeterminate. Mild diffuse hypokinesis is  present.     Right Ventricle  Right ventricular function, chamber size, wall motion, and thickness are  normal.     Atria  The left atrium appears normal. Moderate right atrial enlargement is present.     Mitral Valve  Mitral leaflet thickness is increased  . Mild mitral insufficiency is present.     Aortic Valve  The aortic valve is tricuspid. Mild aortic insufficiency is present.     Tricuspid Valve  Moderate to severe tricuspid insufficiency is present. The right ventricular  systolic pressure is 53mmHg above the right atrial pressure.     Pulmonic Valve  The pulmonic valve cannot be assessed. On Doppler interrogation, there is no  significant stenosis or regurgitation.     Vessels  The aorta root is normal. The pulmonary artery and bifurcation cannot be  assessed. IVC diameter <2.1 cm collapsing >50% with sniff suggests a normal RA  pressure of 3 mmHg.     Pericardium  No pericardial effusion is present.     Compared to Previous Study  There is no prior study for direct comparison.     Attestation  I have personally viewed the imaging and agree with the interpretation and  report as documented by the fellow, Desire SILVA, and/or edited by me.  ______________________________________________________________________________  MMode/2D Measurements & Calculations  IVSd: 1.1 cm  LVIDd: 5.0 cm  LVIDs: 3.7 cm  LVPWd: 1.4 cm  FS: 24.8 %  LV mass(C)d: 244.9 grams  LV mass(C)dI: 146.9 grams/m2  Ao root diam: 3.3 cm  asc Aorta Diam: 3.5 cm  LVOT diam: 2.0 cm  LVOT area: 3.0 cm2  Ao root diam index Ht(cm/m): 1.9  Ao root diam index BSA (cm/m2): 2.0  Asc Ao diam index BSA (cm/m2): 2.1  Asc Ao diam index Ht(cm/m): 2.0  RV Base: 4.8 cm     RWT: 0.55  TAPSE: 3.2 cm     Doppler Measurements & Calculations  MV E max rg: 102.0 cm/sec  MV A max rg: 81.2 cm/sec  MV E/A: 1.3  MV dec slope: 644.9 cm/sec2  MV dec time: 0.16 sec  Ao V2 max: 172.0 cm/sec  Ao max P.8 mmHg  Ao V2 mean: 107.0 cm/sec  Ao mean P.0 mmHg  Ao V2 VTI: 29.6 cm  CHEVY(I,D): 2.2 cm2  CHEVY(V,D): 2.1 cm2  AI P1/2t: 412.2 msec  LV V1 max P.8 mmHg  LV V1 max: 120.0 cm/sec  LV V1 VTI: 21.7 cm  SV(LVOT): 65.3 ml  SI(LVOT): 39.2 ml/m2  PA acc time: 0.06 sec  TR max rg: 403.5 cm/sec  TR max P.1 mmHg  AV Rg Ratio  (DI): 0.70  CHEVY Index (cm2/m2): 1.3  E/E' av.0  Lateral E/e': 6.6     Medial E/e': 21.3  RV S Rg: 16.1 cm/sec     ______________________________________________________________________________  Report approved by: Anu Encinas 2023 04:27 PM             Discharge Medications   Current Discharge Medication List        START taking these medications    Details   polyethylene glycol (MIRALAX) 17 GM/Dose powder Take 17 g by mouth daily    Associated Diagnoses: Constipation, unspecified constipation type      tamsulosin (FLOMAX) 0.4 MG capsule Take 1 capsule (0.4 mg) by mouth every evening    Associated Diagnoses: Benign prostatic hyperplasia with urinary obstruction      triamcinolone (KENALOG) 0.025 % external ointment Apply topically every 48 hours as needed for irritation (severe itch) Apply generous amount of emollient to rash on bilateral lower extremities for severe itch, not to be use daily    Associated Diagnoses: Venous stasis dermatitis of both lower extremities           CONTINUE these medications which have NOT CHANGED    Details   ascorbic acid (VITAMIN C) 500 MG tablet Take 1 tablet by mouth daily.    Associated Diagnoses: Routine general medical examination at a health care facility      Calcium Carbonate (CALCIUM 500 PO) Take 3 tablets by mouth daily.    Associated Diagnoses: Routine general medical examination at a health care facility      cholecalciferol (VITAMIN D) 400 UNIT TABS Take 400 Units by mouth daily.    Associated Diagnoses: Routine general medical examination at a health care facility      COD LIVER OIL PO Take 400 mg by mouth daily.    Associated Diagnoses: Routine general medical examination at a health care facility      magnesium 250 MG tablet Take  by mouth daily. 3 per week    Associated Diagnoses: Routine general medical examination at a health care facility      Multiple Vitamin (MULTI-VITAMIN) per tablet Take by mouth daily 4 per week    Associated Diagnoses: Routine  general medical examination at a health care facility      omega-3 fatty acids (FISH OIL) 1200 MG capsule Take 1 capsule by mouth daily.    Associated Diagnoses: Routine general medical examination at a health care facility      PSYLLIUM PO Fiber supplement    Associated Diagnoses: Routine general medical examination at a health care facility           STOP taking these medications       Aspirin (ASPIR-81 PO) Comments:   Reason for Stopping:         phytonadione (MEPHYTON) 5 MG tablet Comments:   Reason for Stopping:             Allergies   No Known Allergies

## 2023-12-04 NOTE — PLAN OF CARE
/54 (BP Location: Left arm)   Pulse 76   Temp 98.5  F (36.9  C) (Axillary)   Resp 16   Wt 58 kg (127 lb 13.9 oz)   SpO2 97%   BMI 20.03 kg/m      VSS on RA. 1 L of urine output overnight via torres. Patient denies pain except when moving legs. Bilateral leg dressings clean, dry, and intact. Maintained legs/heels elevated on pillows overnight. Turned Q2 hours. Preventative mepilex on sacrum. Hopefully leaving today to TCU pending prior authorization.     Problem: Adult Inpatient Plan of Care  Goal: Readiness for Transition of Care  Outcome: Adequate for Care Transition      Anthony Berrios RN on 12/4/2023 at 6:12 AM

## 2023-12-04 NOTE — PROGRESS NOTES
"CLINICAL NUTRITION SERVICES - ASSESSMENT NOTE     Nutrition Prescription    RECOMMENDATIONS FOR MDs/PROVIDERS TO ORDER:  Encourage oral intake    Malnutrition Status:    Patient does not meet two criteria at this time but is at risk     Recommendations already ordered by Registered Dietitian (RD):  Ordered ensure enlive @ 10:00 + 1 supplement today     Future/Additional Recommendations:  Continue to monitor appetite, oral intake and use of supplements  Monitor weight      REASON FOR ASSESSMENT  Emigdio Hannah is a/an 89 year old male assessed by the dietitian for LOS    CLINICAL HISTORY   Past medical history, who was admitted to [facility] 11/27/2023 for further evaluation of generalized weakness and jaundice in the setting of recently found pancreatic mass.     NUTRITION HISTORY  Emigdio reported his appetite is good and he had a good breakfast, he has not noticed decline in appetite. He does not have any chewing/swallowing issues. No food allergies. Thought his normal weight was around 140 lbs.     CURRENT NUTRITION ORDERS  Diet: Regular  Intake/Tolerance: %     LABS  Na 137. K+ 4.3, Po4 2.5 (WNL)  Alk Phos 195 (H)  Glucose 100 (H)    MEDICATIONS  Cholecalciferol   Miralax    ANTHROPOMETRICS  Height: 170 cm (5' 7\")  Most Recent Weight: 58 kg (127 lb 13.9 oz)    IBW: 67.3 kg  BMI: Normal BMI  Weight History:   Wt Readings from Last 25 Encounters:   12/03/23 58 kg (127 lb 13.9 oz)   11/21/23 57.6 kg (127 lb)   11/16/23 56.2 kg (124 lb)   07/31/15 62.6 kg (138 lb)   05/08/15 61.1 kg (134 lb 12 oz)   04/10/14 63 kg (139 lb)   09/18/12 64.9 kg (143 lb)   09/07/12 65.2 kg (143 lb 12 oz)   08/30/12 64 kg (141 lb)     Dosing Weight: 58 kg    ASSESSED NUTRITION NEEDS  Estimated Energy Needs: 9092-5459+ kcals/day (25 - 30+ kcals/kg)  Justification: Maintenance  Estimated Protein Needs: 70 grams protein/day (1.2 g/kg)  Justification: Increased needs  Estimated Fluid Needs: 8930-9800 mL/day (1 mL/kcal)   Justification: " Maintenance    PHYSICAL FINDINGS  RLE with wound- BLE pitting edema, possible vasculitis. WOCN assessed and Derm consulted.     MALNUTRITION  % Intake: Decreased intake does not meet criteria  % Weight Loss: Unable to assess  Subcutaneous Fat Loss: Generalized losses with advanced age  Muscle Loss: Generalized losses with advanced age  Fluid Accumulation/Edema: Moderate  Malnutrition Diagnosis: Patient does not meet two of the established criteria necessary for diagnosing malnutrition but is at risk for malnutrition    NUTRITION DIAGNOSIS  Increased nutrient needs (energy/protein) related to increased metabolic demand for wound healing as evidenced by RLE     INTERVENTIONS  Implementation  Nutrition Education: RD role in care   Medical food supplement therapy- Ensure enlive ordered     Goals  Patient to consume % of nutritionally adequate meal trays TID, or the equivalent with supplements/snacks.     Monitoring/Evaluation  Progress toward goals will be monitored and evaluated per protocol.  Savita Escoto RD, LD  5C/BMT pager: [phone]

## 2023-12-04 NOTE — PROGRESS NOTES
Care Management Follow Up    Length of Stay (days): 7    Expected Discharge Date: 12/04/2023     Concerns to be Addressed: discharge planning - transport and PAS  Patient plan of care discussed at interdisciplinary rounds: Yes    Anticipated Discharge Disposition: Transitional Care  [facility]   Anticipated Discharge Services: Transportation Services  Anticipated Discharge DME: None    Patient/family educated on Medicare website which has current facility and service quality ratings: yes  Education Provided on the Discharge Plan: Yes  Patient/Family in Agreement with the Plan: yes    Referrals Placed by CM/SW:  n/a  Private pay costs discussed: Not applicable    Additional Information:    CHW completed a preadmission screening on behalf of the patient for the accepting facility, [facility].    [id number]    CHW also scheduled a wheelchair transport for the patient through [facility]. The only service window available is between 7:08 pm - 7:53 pm. The facility said it was fine as long as the discharge orders are sent by the provider by 3:00 pm.    CHW alerted bedside nurse.    CHW to continue to follow if needed.    [email or web address]  [phone]

## 2023-12-04 NOTE — PROGRESS NOTES
Care Management Follow Up    Length of Stay (days): 7  Expected Discharge Date: 12/04/2023  Concerns to be Addressed: discharge planning     Patient plan of care discussed at interdisciplinary rounds: Yes  Anticipated Discharge Disposition: Transitional Care  Anticipated Discharge Services: Transportation Services  Anticipated Discharge DME: None  Patient/family educated on Medicare website which has current facility and service quality ratings: yes  Education Provided on the Discharge Plan: Yes  Patient/Family in Agreement with the Plan: yes  Referrals Placed by CM/SW:  NA  Private pay costs discussed: Not applicable    Additional Information:  RNCC was asked by SW to assist with calling [employer] to resolve any auth issues they had called about last Friday. RNCC called [email or web address] and was notified by auth representative that auth has been approved for 12/4-12/8 and auth form was faxed to hospital this morning around 7am. RNCC found auth at fax machine and faxed to [facility].     RNCC received call from primary team asking for assistance with setting up PCP follow up in next 2 weeks and OP Urology follow up. RNCC notified primary team that once referral for OP Urology is in placed, someone from scheduling will call pt to get him set up. RNCC called central scheduling and have pt set up to follow up with PCP on 12/12/23 at 9:15am with Dr. Baker at [facility].       Eb Mccollum RN BSN  5A RN Care Coordinator   Ph: [phone]  Pager: [phone]

## 2023-12-05 NOTE — PLAN OF CARE
Physical Therapy Discharge Summary    Reason for therapy discharge:    Discharged to transitional care facility.    Progress towards therapy goal(s). See goals on Care Plan in [facility] electronic health record for goal details.  Goals not met.  Barriers to achieving goals:   discharge from facility.    Therapy recommendation(s):    Continued therapy is recommended.  Rationale/Recommendations:  Pt demonstrating impaired mobility below baseline and requiring maxA2 with walker for amb and Ax2 for bed mobility. Will need to confirm baseline with family members. Pt would benefit from TCU to continue to address decreased activity tolerance and decreased strength..

## 2023-12-05 NOTE — PROGRESS NOTES
Clinic Care Coordination Contact  Care Coordination Transition Communication    Clinical Data: Patient was hospitalized at [facility] from 11/27/23 to 12/4/23 with diagnosis of Pancreatic cancer, superficial blood clots in leg.     Assessment: Patient has transitioned to TCU/ARU for short term rehabilitation:    Facility Name:     [facility]  [address] [place] [id number]   [phone]   [phone]  Nurse Report Line: [phone]  Transition Communication:  Notified facility of Primary Care- Care Coordination support via  epic fax.    Plan: Care Coordinator will await notification from facility staff informing of patient's discharge plans/needs. Care Coordinator will review chart and outreach to facility staff every 4 weeks and as needed.     Sp Delaney MSN, RN, PHN, [facility]   Primary Care Clinical RN Care Coordinator  [employer] [facility]  12/5/2023   7:46 AM  [email or web address]  Office: [phone]

## 2023-12-05 NOTE — LETTER
Hand-off  for Care Coordination  What is Care Coordination?  [facility] Care Coordination Services are available to people in complex situations,   for example medical, social or financial. The Care Coordinator, a SW or an RN, works with the   patient and their doctor to determine health goals, obtain resources, achieve outcomes,   and develop plans to coordinate care across settings.      Patient Name:   Emigdio Hannah  Patient :     3/17/1926  Patient PCP:     Mark Baker MD    Patient Primary Clinic:   [address] [place] [id number]  D/C Facility: _____________________________________________   TCU Contact Info for questions: ___________________________  D/C Date:  ______________________________________________  Follow-up Apt with PCP after TCU D/C:   ____________________  Other Follow-up Apt s:      _________________________________________  Additional information (concerns, and Home Care, ect ):   __________________________________________________________________  __________________________________________________________________  Care Coordinator to Contact at [place]  Fax to: [phone]  Attn: Sp Delaney RN  Phone: [phone]

## 2023-12-14 NOTE — TELEPHONE ENCOUNTER
Lvm sent letter for patient to scheduled the following:    Appointment type: New  Provider: Dr. Baker (Any)  Return date: 1st Available  Specialty phone number: [phone]

## 2023-12-27 NOTE — TELEPHONE ENCOUNTER
Roque from [employer] (ph: [phone]) is calling to see if Jamila Bob CNP will follow patient for future home care orders via phone and/or fax?  Okay to leave message.      Francisca Meneses,

## 2023-12-27 NOTE — TELEPHONE ENCOUNTER
I am ok with being PCP for Emigdio, can you update Roque from [facility] (ph: [phone]. If I need to address it I can do tomorrow.     Thanks,  Jamila RIOS, CNP

## 2023-12-28 NOTE — TELEPHONE ENCOUNTER
I have no problems providing orders for Emigdio. I need fax number to send orders too. Do we have that number available?

## 2023-12-28 NOTE — TELEPHONE ENCOUNTER
"Received call from VAL Dubois, with [employer]. She is calling to request orders from GABRIEL Lenz, CNP for management of patient's in dwelling torres catheter. The orders would be for monthly and PRN torres cath changes using the 14F, PRN flushes if blocked or not draining, and orders for sterile insertion kit 14x30 indwelling torres cath, 4 stat locks, 2 leg bags, and 2 overnight bags. States that if Jamila able to approve these orders, she calls the supply company and they would fax the order to our office for physician to sign. Please advise.    Per Silvino, pt recently hospitalized 11/27-12/04/23 at [facility], then discharged to a TCU and sent home with an in-dwelling torres catheter from TCU. He has a 14Fcc. He has a leg bag and they taught him how to drain that, but did not send over any overnight bag or any supplies at all and they did not write any home care orders for managing the catheter. She is hoping to get orders for this from our office, or is not sure if he needs to see Urologist for management.     He has an ED follow-up appointment with Jamila for 01/09/2024.    Per chart review, torres catheter placed for \"Urinary Retention, Likely BPH and ERIC, resolved.\"    Pt does not have any PCP listed currently. Per Silvino, pt's intention is to continue seeing physicians at our office for primary care. Notified her that it would be advisable for patient to schedule an appointment to establish care with someone and this can be with a physician at our office.     Melissa Crawford RN   [facility]  "

## 2023-12-29 NOTE — PROGRESS NOTES
Clinic Care Coordination Contact  Clinic Care Coordination Contact  OUTREACH    Referral Information:  Referral Source: SNF/TCU    Primary Diagnosis: GI Disorders    Chief Complaint   Patient presents with    Clinic Care Coordination - Homecare/TCU     Discharge from TCU        [place] Utilization: The patient uses the [facility] system and the [facility].  Clinic Utilization  Difficulty keeping appointments:: No  Compliance Concerns: No  No-Show Concerns: No  No PCP office visit in Past Year: No  Utilization      No Show Count (past year)  1             ED Visits  1             Hospital Admissions  1                    Current as of: 12/28/2023  8:56 PM                Clinical Concerns:  Current Medical Concerns: The patient was recently hospitalized for constipation and abdominal pain.  The patient is very hard of hearing and therefore asked that you speak with his wife.  At this time the patient has no pain.  He was just recently released from the TCU at [place].  The goal set with the patient is to increase his strength and stamina by walking to and from his chair in the bathroom or around his home.  The patient will gradually add time walked as tolerated.  And the patient will gradually add distance walked as tolerated.  Patient Active Problem List   Diagnosis    CARDIOVASCULAR SCREENING; LDL GOAL LESS THAN 160    Advanced directives, counseling/discussion    Squamous cell carcinoma in situ    Hearing loss    Generalized weakness       Current Behavioral Concerns: None currently noted.  Education Provided to patient: Options for care coordination, and assistance in making a goal.  Pain  Pain (GOAL):: No  Health Maintenance Reviewed: Due/Overdue   Health Maintenance Due   Topic Date Due    RSV VACCINE (Pregnancy & 60+) (1 - 1-dose 60+ series) Never done    ZOSTER IMMUNIZATION (1 of 2) 11/18/2014    MEDICARE ANNUAL WELLNESS VISIT  05/08/2016    ADVANCE CARE PLANNING  05/05/2019    DTAP/TDAP/TD  IMMUNIZATION (3 - Td or Tdap) 08/30/2022    COVID-19 Vaccine (8 - 2023-24 season) 11/27/2023       Clinical Pathway: None    Medication Management:  Medication review status: Medications reviewed and no changes reported per patient.        Patient is knowledgeable on medications and is adherent.  No financial concerns reported at this time.  Medication review was completed with the patient and there are no questions or concerns at this time.       Functional Status:  Dependent ADLs:: Independent  Dependent IADLs:: Independent  Bed or wheelchair confined:: No  Mobility Status: Independent w/Device  Fallen 2 or more times in the past year?: No  Any fall with injury in the past year?: No    Living Situation:  Current living arrangement:: I live in a private home with family  Type of residence:: Private home - [facility]    Lifestyle & Psychosocial Needs:    Social Determinants of Health     Food Insecurity: Not on file   Depression: Not at risk (11/16/2023)    PHQ-2     PHQ-2 Score: 0   Housing Stability: Not on file   Tobacco Use: Low Risk  (11/28/2023)    Patient History     Smoking Tobacco Use: Never     Smokeless Tobacco Use: Never     Passive Exposure: Not on file   Financial Resource Strain: Not on file   Alcohol Use: Not on file   Transportation Needs: Not on file   Physical Activity: Not on file   Interpersonal Safety: Not on file   Stress: Not on file   Social Connections: Not on file     Diet:: Regular  Inadequate nutrition (GOAL):: No  Tube Feeding: No  Inadequate activity/exercise (GOAL):: No  Significant changes in sleep pattern (GOAL): No  Transportation means:: Regular car, Family        Resources and Interventions:  Current Resources:   Skilled Home Care Services: Skilled Nursing, Physical Therapy, Occupational Therapy  Community Resources: Other (see comment)  Supplies Currently Used at Home: Hearing Aid Batteries  Equipment Currently Used at Home: cane, straight, other (see comments), shower chair,  walker, standard  Employment Status: retired         Advance Care Plan/Directive  Advanced Care Plans/Directives on file:: No  Discussed with patient/caregiver:: Other (Comment)    Referrals Placed: None         Care Plan:  Care Plan: General - increase strength and stamina       Problem: HP GENERAL PROBLEM       Goal: General Goal - increase strength and stamina       Start Date: 12/29/2023    Note:     Barriers: Recently hospitalized for constipation and abdominal pain.  Strengths: Engaged in care coordination.  Patient expressed understanding of goal: Yes  Action steps to achieve this goal:  1. I will gradually walk around the house or back and forth from my chair to the bathroom.  2. I will gradually add time walked as tolerated.  3. I will gradually add distance walked as tolerated.                                Patient/Caregiver understanding: The patient has a little bit of understanding of the disease process.    Outreach Frequency: monthly, more frequently as needed  Future Appointments                In 1 week Jamila Bob APRN CNP [facility] [place] [facility]            Plan: 1.  The patient will take all medications as prescribed by the providers.  2.  The patient will walk around the house and gradually add time walked as tolerated.  3.  The patient will walk around the house and gradually add distance walked as tolerated.          Sp Delaney MSN, RN, PHN, CCM   Primary Care Clinical RN Care Coordinator  [facility]  12/29/2023   3:24 PM  [email or web address]  Office: [phone]

## 2023-12-29 NOTE — LETTER
[facility]  Patient Centered Plan of Care  About Me:        Patient Name:  Emigdio Wagner    YOB: 1926  Age:         89 year old   Abdirashid MRN:    [id number] Telephone Information:  Home Phone [phone]   Mobile Not on file.       Address:  [address] [facility] [address] Email address:  No e-mail address on record      Emergency Contact(s)    Name Relationship Lgl Grd Work Phone Home Phone Mobile Phone   1. BHAVESH HOFF Spouse   [phone]    2. MEGHA HOFF Daughter  [phone]    3. LIZZY WAGNER                Primary language:  English     needed? Yes   [place] [place] [facility]:  [phone] op. 1  Other communication barriers:Hearing aides; [place] (Hard of hearing)    Preferred Method of Communication:  Mail  Current living arrangement: I live in a private home with family    Mobility Status/ Medical Equipment: Independent w/Device        Health Maintenance  Health Maintenance Reviewed: Due/Overdue   Health Maintenance Due   Topic Date Due    RSV VACCINE (Pregnancy & 60+) (1 - 1-dose 60+ series) Never done    ZOSTER IMMUNIZATION (1 of 2) 11/18/2014    MEDICARE ANNUAL WELLNESS VISIT  05/08/2016    ADVANCE CARE PLANNING  05/05/2019    DTAP/TDAP/TD IMMUNIZATION (3 - Td or Tdap) 08/30/2022    COVID-19 Vaccine (8 - 2023-24 season) 11/27/2023           My Access Plan  Medical Emergency 911   Primary Clinic Line [facility] [phone]   24 Hour Appointment Line [phone] or  [phone]) (toll-free)   24 Hour Nurse Line [phone] (toll-free)   Preferred Urgent Care No data recorded   Preferred Hospital [facility]  [phone]     Preferred Pharmacy [id number] - [place], [place] - [address]     Behavioral Health Crisis Line The National Suicide Prevention Lifeline at 1-879.573.9862 or Text/Call 968           My Care Team  Members  Patient Care Team         Relationship Specialty Notifications Start End    Jamila Bob APRN CNP PCP - General Family Medicine All results, Admissions 12/28/23     Phone: [phone] Pager: [phone] Fax: [phone] [address] [facility] [place] [address]    Johanne Gutierrez MD MD Surgery Surgical Critical Care  11/22/23     Phone: [phone] Fax: [phone]         [address] [facility] [address] [facility] [address]    Sp Shelley, RN Lead Care Coordinator Primary Care - CC Admissions 12/5/23     Phone: [phone] Fax: [phone]                    My Care Plans  Self Management and Treatment Plan    Care Plan  Care Plan: General - increase strength and stamina       Problem: HP GENERAL PROBLEM       Goal: General Goal - increase strength and stamina       Start Date: 12/29/2023    Note:     Barriers: Recently hospitalized for constipation and abdominal pain.  Strengths: Engaged in care coordination.  Patient expressed understanding of goal: Yes  Action steps to achieve this goal:  1. I will gradually walk around the house or back and forth from my chair to the bathroom.  2. I will gradually add time walked as tolerated.  3. I will gradually add distance walked as tolerated.                                Action Plans on File:                       Advance Care Plans/Directives:   Advanced Care Plan/Directives on file:   No    Discussed with patient/caregiver(s):   Other (Comment)             My Medical and Care Information  Problem List   Patient Active Problem List   Diagnosis    CARDIOVASCULAR SCREENING; LDL GOAL LESS THAN 160    Advanced directives, counseling/discussion    Squamous cell carcinoma in situ    Hearing loss    Generalized weakness      Current Medications and Allergies:  See printed Medication Report.    Care Coordination Start Date: 12/5/2023   Frequency of Care Coordination: monthly, more frequently as needed     Form Last Updated: 12/29/2023

## 2023-12-29 NOTE — LETTER
[facility] CARE COORDINATION  [address] [facility]  [place] [address]    December 29, 2023    Emigdio Hannah  [address] [place] [address]      Dear Emigdio,    I am a clinic care coordinator who works with GABRIEL Lenz CNP with the [facility]. I wanted to introduce myself and provide you with my contact information for you to be able to call me with any questions or concerns. I wanted to thank you for spending the time to talk with me.  Below is a description of clinic care coordination and how I can further assist you.       The clinic care coordination team is made up of a registered nurse, , financial resource worker and community health worker who understand the health care system. The goal of clinic care coordination is to help you manage your health and improve access to the health care system. Our team works alongside your provider to assist you in determining your health and social needs. We can help you obtain health care and community resources, providing you with necessary information and education. We can work with you through any barriers and develop a care plan that helps coordinate and strengthen the communication between you and your care team.  Our services are voluntary and are offered without charge to you personally.    Please feel free to contact me with any questions or concerns regarding care coordination and what we can offer.      We are focused on providing you with the highest-quality healthcare experience possible.    Sincerely,     Sp Delaney MSN, RN, PHN, [facility]   Primary Care Clinical RN Care Coordinator  [facility]  12/29/2023   3:17 PM  [email or web address]  Office: [phone]      Enclosed: I have enclosed a copy of the Patient Centered Plan of Care. This has helpful information and goals that we have talked about. Please keep this in an easy to access place to use as needed.

## 2024-01-01 ENCOUNTER — TELEPHONE (OUTPATIENT)
Dept: UROLOGY | Facility: CLINIC | Age: 89
End: 2024-01-01
Payer: COMMERCIAL

## 2024-01-01 ENCOUNTER — TELEPHONE (OUTPATIENT)
Dept: INTERNAL MEDICINE | Facility: CLINIC | Age: 89
End: 2024-01-01

## 2024-01-01 ENCOUNTER — LAB REQUISITION (OUTPATIENT)
Dept: LAB | Facility: CLINIC | Age: 89
End: 2024-01-01
Payer: COMMERCIAL

## 2024-01-01 ENCOUNTER — OFFICE VISIT (OUTPATIENT)
Dept: INTERNAL MEDICINE | Facility: CLINIC | Age: 89
End: 2024-01-01
Payer: COMMERCIAL

## 2024-01-01 ENCOUNTER — PRE VISIT (OUTPATIENT)
Dept: UROLOGY | Facility: CLINIC | Age: 89
End: 2024-01-01

## 2024-01-01 ENCOUNTER — PATIENT OUTREACH (OUTPATIENT)
Dept: CARE COORDINATION | Facility: CLINIC | Age: 89
End: 2024-01-01
Payer: COMMERCIAL

## 2024-01-01 ENCOUNTER — PRE VISIT (OUTPATIENT)
Dept: UROLOGY | Facility: CLINIC | Age: 89
End: 2024-01-01
Payer: COMMERCIAL

## 2024-01-01 ENCOUNTER — TELEPHONE (OUTPATIENT)
Dept: INTERNAL MEDICINE | Facility: CLINIC | Age: 89
End: 2024-01-01
Payer: COMMERCIAL

## 2024-01-01 VITALS
DIASTOLIC BLOOD PRESSURE: 78 MMHG | OXYGEN SATURATION: 99 % | HEART RATE: 72 BPM | SYSTOLIC BLOOD PRESSURE: 130 MMHG | RESPIRATION RATE: 16 BRPM | BODY MASS INDEX: 20.99 KG/M2 | WEIGHT: 134 LBS | TEMPERATURE: 98.2 F

## 2024-01-01 VITALS
RESPIRATION RATE: 16 BRPM | BODY MASS INDEX: 19.73 KG/M2 | TEMPERATURE: 99.3 F | SYSTOLIC BLOOD PRESSURE: 118 MMHG | DIASTOLIC BLOOD PRESSURE: 64 MMHG | HEART RATE: 74 BPM | WEIGHT: 126 LBS | OXYGEN SATURATION: 98 %

## 2024-01-01 DIAGNOSIS — Z53.9 DIAGNOSIS NOT YET DEFINED: Primary | ICD-10-CM

## 2024-01-01 DIAGNOSIS — S72.041A: ICD-10-CM

## 2024-01-01 DIAGNOSIS — H90.6 MIXED CONDUCTIVE AND SENSORINEURAL HEARING LOSS OF BOTH EARS: ICD-10-CM

## 2024-01-01 DIAGNOSIS — Z29.11 NEED FOR VACCINATION AGAINST RESPIRATORY SYNCYTIAL VIRUS: ICD-10-CM

## 2024-01-01 DIAGNOSIS — S72.001A CLOSED DISPLACED FRACTURE OF RIGHT FEMORAL NECK (H): ICD-10-CM

## 2024-01-01 DIAGNOSIS — N13.8 BENIGN PROSTATIC HYPERPLASIA WITH URINARY OBSTRUCTION: ICD-10-CM

## 2024-01-01 DIAGNOSIS — R60.9 EDEMA, UNSPECIFIED TYPE: ICD-10-CM

## 2024-01-01 DIAGNOSIS — N40.1 BENIGN PROSTATIC HYPERPLASIA WITH URINARY OBSTRUCTION: ICD-10-CM

## 2024-01-01 DIAGNOSIS — T14.8XXA OPEN WOUND: ICD-10-CM

## 2024-01-01 DIAGNOSIS — D64.89 OTHER SPECIFIED ANEMIAS: ICD-10-CM

## 2024-01-01 DIAGNOSIS — L85.3 DRY SKIN: ICD-10-CM

## 2024-01-01 DIAGNOSIS — Z23 NEED FOR SHINGLES VACCINE: ICD-10-CM

## 2024-01-01 DIAGNOSIS — Z23 NEED FOR TDAP VACCINATION: ICD-10-CM

## 2024-01-01 DIAGNOSIS — R33.9 URINARY RETENTION: ICD-10-CM

## 2024-01-01 DIAGNOSIS — Z09 HOSPITAL DISCHARGE FOLLOW-UP: Primary | ICD-10-CM

## 2024-01-01 DIAGNOSIS — R53.1 GENERALIZED WEAKNESS: ICD-10-CM

## 2024-01-01 LAB
ALBUMIN SERPL BCG-MCNC: 2.7 G/DL (ref 3.5–5.2)
ALBUMIN SERPL BCG-MCNC: 3.7 G/DL (ref 3.5–5.2)
ALP SERPL-CCNC: 97 U/L (ref 40–150)
ALP SERPL-CCNC: 99 U/L (ref 40–150)
ALT SERPL W P-5'-P-CCNC: 10 U/L (ref 0–70)
ALT SERPL W P-5'-P-CCNC: 15 U/L (ref 0–70)
ANION GAP SERPL CALCULATED.3IONS-SCNC: 7 MMOL/L (ref 7–15)
ANION GAP SERPL CALCULATED.3IONS-SCNC: 8 MMOL/L (ref 7–15)
AST SERPL W P-5'-P-CCNC: 22 U/L (ref 0–45)
AST SERPL W P-5'-P-CCNC: 26 U/L (ref 0–45)
BASOPHILS # BLD AUTO: 0.1 10E3/UL (ref 0–0.2)
BASOPHILS NFR BLD AUTO: 1 %
BILIRUB SERPL-MCNC: 0.6 MG/DL
BILIRUB SERPL-MCNC: 1.1 MG/DL
BUN SERPL-MCNC: 19.4 MG/DL (ref 8–23)
BUN SERPL-MCNC: 21.3 MG/DL (ref 8–23)
CALCIUM SERPL-MCNC: 8.7 MG/DL (ref 8.2–9.6)
CALCIUM SERPL-MCNC: 9.2 MG/DL (ref 8.2–9.6)
CHLORIDE SERPL-SCNC: 102 MMOL/L (ref 98–107)
CHLORIDE SERPL-SCNC: 106 MMOL/L (ref 98–107)
CREAT SERPL-MCNC: 0.96 MG/DL (ref 0.67–1.17)
CREAT SERPL-MCNC: 0.96 MG/DL (ref 0.67–1.17)
DEPRECATED HCO3 PLAS-SCNC: 27 MMOL/L (ref 22–29)
DEPRECATED HCO3 PLAS-SCNC: 29 MMOL/L (ref 22–29)
EGFRCR SERPLBLD CKD-EPI 2021: 72 ML/MIN/1.73M2
EGFRCR SERPLBLD CKD-EPI 2021: 72 ML/MIN/1.73M2
EOSINOPHIL # BLD AUTO: 0.3 10E3/UL (ref 0–0.7)
EOSINOPHIL NFR BLD AUTO: 4 %
ERYTHROCYTE [DISTWIDTH] IN BLOOD BY AUTOMATED COUNT: 15.2 % (ref 10–15)
ERYTHROCYTE [DISTWIDTH] IN BLOOD BY AUTOMATED COUNT: 15.2 % (ref 10–15)
GLUCOSE SERPL-MCNC: 80 MG/DL (ref 70–99)
GLUCOSE SERPL-MCNC: 84 MG/DL (ref 70–99)
HCT VFR BLD AUTO: 29 % (ref 40–53)
HCT VFR BLD AUTO: 29.5 % (ref 40–53)
HGB BLD-MCNC: 9.3 G/DL (ref 13.3–17.7)
HGB BLD-MCNC: 9.4 G/DL (ref 13.3–17.7)
IMM GRANULOCYTES # BLD: 0.1 10E3/UL
IMM GRANULOCYTES NFR BLD: 1 %
LYMPHOCYTES # BLD AUTO: 1.1 10E3/UL (ref 0.8–5.3)
LYMPHOCYTES NFR BLD AUTO: 15 %
MCH RBC QN AUTO: 29.5 PG (ref 26.5–33)
MCH RBC QN AUTO: 30.2 PG (ref 26.5–33)
MCHC RBC AUTO-ENTMCNC: 31.9 G/DL (ref 31.5–36.5)
MCHC RBC AUTO-ENTMCNC: 32.1 G/DL (ref 31.5–36.5)
MCV RBC AUTO: 93 FL (ref 78–100)
MCV RBC AUTO: 94 FL (ref 78–100)
MONOCYTES # BLD AUTO: 0.8 10E3/UL (ref 0–1.3)
MONOCYTES NFR BLD AUTO: 11 %
NEUTROPHILS # BLD AUTO: 5.1 10E3/UL (ref 1.6–8.3)
NEUTROPHILS NFR BLD AUTO: 68 %
NRBC # BLD AUTO: 0 10E3/UL
NRBC BLD AUTO-RTO: 0 /100
PLATELET # BLD AUTO: 253 10E3/UL (ref 150–450)
PLATELET # BLD AUTO: 318 10E3/UL (ref 150–450)
POTASSIUM SERPL-SCNC: 4.6 MMOL/L (ref 3.4–5.3)
POTASSIUM SERPL-SCNC: 4.9 MMOL/L (ref 3.4–5.3)
PROT SERPL-MCNC: 6.4 G/DL (ref 6.4–8.3)
PROT SERPL-MCNC: 7.5 G/DL (ref 6.4–8.3)
RBC # BLD AUTO: 3.08 10E6/UL (ref 4.4–5.9)
RBC # BLD AUTO: 3.19 10E6/UL (ref 4.4–5.9)
SODIUM SERPL-SCNC: 139 MMOL/L (ref 135–145)
SODIUM SERPL-SCNC: 140 MMOL/L (ref 135–145)
WBC # BLD AUTO: 6.2 10E3/UL (ref 4–11)
WBC # BLD AUTO: 7.4 10E3/UL (ref 4–11)

## 2024-01-01 PROCEDURE — 36415 COLL VENOUS BLD VENIPUNCTURE: CPT

## 2024-01-01 PROCEDURE — 85025 COMPLETE CBC W/AUTO DIFF WBC: CPT | Performed by: REGISTERED NURSE

## 2024-01-01 PROCEDURE — G0180 MD CERTIFICATION HHA PATIENT: HCPCS

## 2024-01-01 PROCEDURE — P9604 ONE-WAY ALLOW PRORATED TRIP: HCPCS | Performed by: REGISTERED NURSE

## 2024-01-01 PROCEDURE — 36415 COLL VENOUS BLD VENIPUNCTURE: CPT | Performed by: REGISTERED NURSE

## 2024-01-01 PROCEDURE — 80053 COMPREHEN METABOLIC PANEL: CPT

## 2024-01-01 PROCEDURE — 99214 OFFICE O/P EST MOD 30 MIN: CPT

## 2024-01-01 PROCEDURE — P9604 ONE-WAY ALLOW PRORATED TRIP: HCPCS | Performed by: FAMILY MEDICINE

## 2024-01-01 PROCEDURE — 36415 COLL VENOUS BLD VENIPUNCTURE: CPT | Performed by: FAMILY MEDICINE

## 2024-01-01 PROCEDURE — 80053 COMPREHEN METABOLIC PANEL: CPT | Performed by: REGISTERED NURSE

## 2024-01-01 PROCEDURE — 85027 COMPLETE CBC AUTOMATED: CPT | Performed by: FAMILY MEDICINE

## 2024-01-01 PROCEDURE — 99213 OFFICE O/P EST LOW 20 MIN: CPT

## 2024-01-01 RX ORDER — TAMSULOSIN HYDROCHLORIDE 0.4 MG/1
0.4 CAPSULE ORAL EVERY EVENING
Qty: 90 CAPSULE | Refills: 1 | Status: SHIPPED | OUTPATIENT
Start: 2024-01-01

## 2024-01-01 RX ORDER — RESPIRATORY SYNCYTIAL VIRUS VACCINE 120MCG/0.5
0.5 KIT INTRAMUSCULAR ONCE
Qty: 1 EACH | Refills: 0 | Status: CANCELLED | OUTPATIENT
Start: 2024-01-01 | End: 2024-01-01

## 2024-01-01 ASSESSMENT — ENCOUNTER SYMPTOMS
CONSTIPATION: 1
COUGH: 0
SHORTNESS OF BREATH: 0
DIFFICULTY URINATING: 1
CONFUSION: 0
HEADACHES: 0

## 2024-01-09 NOTE — PROGRESS NOTES
DME (Durable Medical Equipment) Orders and Documentation  Orders Placed This Encounter   Procedures     Catheterization Supplies Order        The patient was assessed and it was determined the patient is in need of the following listed DME Supplies/Equipment. Please complete supporting documentation below to demonstrate medical necessity.

## 2024-01-09 NOTE — LETTER
Emigdio Hannah has been seen in clinic today and has history of urinary rentention. Patient failed a voiding trial prior to discharge from TCU and continues to require indwelling torres catheter.       Orders for indwelling torres catheter are as follows:  Ok for monthly and PRN torres cath changes using the 14F, PRN flushes if blocked or not draining, requires a sterile insertion kit 14x30 indwelling torres cath, 4 stat locks, 2 leg bags, and 2 overnight bags.     Thanks,     Jamila Bob APRN, CNP

## 2024-01-09 NOTE — TELEPHONE ENCOUNTER
Reason for Call:  Form, our goal is to have forms completed with 72 hours, however, some forms may require a visit or additional information.    Type of letter, form or note:  Home Health Certification    Who is the form from?: Home care    Where did the form come from: form was faxed in    What clinic location was the form placed at?: [facility]    Where the form was placed: Given to physician    What number is listed as a contact on the form?: [phone]       Call taken on 1/9/2024 at 10:49 AM by Francisca Meneses

## 2024-01-09 NOTE — PROGRESS NOTES
(Z09) Hospital discharge follow-up  (primary encounter diagnosis)  Comment: Patient is seen in clinic today for hospital follow up. Patient was noted to be A/Ox3..   Plan: Comprehensive metabolic panel (BMP + Alb, Alk  Phos, ALT, AST, Total. Bili, TP)        Pending.     R60.9) Edema, unspecified type   Comment:  Denies pain, noted right leg edema, currently is being wrapped mid calf, recommend wrapping to knee, did call and have conversation with Maria A NEAL patient home care nurse and relayed need to wrap higher. While patient has had weight changes, his lung sounds are clear, and no other edema is noted this visit. Adding a diuretic would no be in patient best interest at this time this was discussed with Maria A NEAL.     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 Discussed with patient need for urology referral for further assessment and plan of care. Orders for catheter sent to Home Health Care Fax# [phone] C/O Maria A NEAL. Also reordered tamsulosin.   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 No new concerns noted.  Plan: Continue current plan of care  (T14.8XXA) Open wound   Comment: Patient has wound on right heal that is being managed by home health. Spoke to Maria A NEAL and no concerns are noted at this time. Updated Maria A NEAL that if wound care orders are needed to please update.  Plan: Wound care is being managed by Home Care Rn    (Z23) Need for shingles vaccine  Comment: Discussed vaccine. Patient stated he is up to date on the shots he wants.  Plan: Declined vaccine    (Z23) Need for Tdap vaccination  Comment: Discussed vaccine. Patient stated he is up to date on the shots he  wants.  Plan: Declined vaccine    (Z29.11) Need for vaccination against respiratory syncytial virus  Comment: Discussed vaccine. Patient stated he is up to date on the shots he wants.  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 No   Problems taking medications regularly:  None  Medication changes since discharge: None  Problems adhering to non-medication therapy:  None    Summary of hospitalization:  [facility] discharge summary reviewed  Diagnostic Tests/Treatments reviewed.  Follow up needed: none  Other Healthcare Providers Involved in Patient s Care:         Homecare and Specialist appointment - Urology  Update since discharge: stable.         Plan of care communicated with patient               Review of Systems   HENT:  Negative for congestion.    Respiratory:  Negative for cough and shortness of breath.    Gastrointestinal:  Positive for constipation.   Genitourinary:  Positive for difficulty  urinating.   Neurological:  Negative for headaches.   Psychiatric/Behavioral:  Negative for confusion.             Objective    /64   Pulse 74   Temp 99.3  F (37.4  C) (Temporal)   Resp 16   Wt 57.2 kg (126 lb)   SpO2 98%   BMI 19.73 kg/m    Body mass index is 19.73 kg/m .  Physical Exam  HENT:      Nose: Nose normal.   Eyes:      Pupils: Pupils are equal, round, and reactive to light.   Cardiovascular:      Rate and Rhythm: Normal rate and regular rhythm.      Pulses: Normal pulses.      Heart sounds: Normal heart sounds. No murmur heard.  Pulmonary:      Effort: Pulmonary effort is normal. No respiratory distress.      Breath sounds: Normal breath sounds. No wheezing.   Abdominal:      General: Bowel sounds are normal. There is no distension.      Tenderness: There is no abdominal tenderness.   Musculoskeletal:         General: Swelling present. Normal range of motion.      Right lower leg: Edema present.   Skin:     General: Skin is warm.      Findings: Lesion present.      Comments: Wound on heal   Neurological:      Mental Status: He is alert.            No results found for any visits on 01/09/24.

## 2024-01-09 NOTE — LETTER
January 10, 2024    Emigdio Hannah  [address] [facility] [address]          Dear ,    We are writing to inform you of your test results.  Your results are normal.       Resulted Orders   Comprehensive metabolic panel (BMP + Alb, Alk Phos, ALT, AST, Total. Bili, TP)   Result Value Ref Range    Sodium 139 135 - 145 mmol/L      Comment:      Reference intervals for this test were updated on 09/26/2023 to more accurately reflect our healthy population. There may be differences in the flagging of prior results with similar values performed with this method. Interpretation of those prior results can be made in the context of the updated reference intervals.     Potassium 4.6 3.4 - 5.3 mmol/L    Carbon Dioxide (CO2) 29 22 - 29 mmol/L    Anion Gap 8 7 - 15 mmol/L    Urea Nitrogen 21.3 8.0 - 23.0 mg/dL    Creatinine 0.96 0.67 - 1.17 mg/dL    GFR Estimate 72 >60 mL/min/1.73m2    Calcium 9.2 8.2 - 9.6 mg/dL    Chloride 102 98 - 107 mmol/L    Glucose 84 70 - 99 mg/dL    Alkaline Phosphatase 99 40 - 150 U/L      Comment:      Reference intervals for this test were updated on 11/14/2023 to more accurately reflect our healthy population. There may be differences in the flagging of prior results with similar values performed with this method. Interpretation of those prior results can be made in the context of the updated reference intervals.    AST 22 0 - 45 U/L      Comment:      Reference intervals for this test were updated on 6/12/2023 to more accurately reflect our healthy population. There may be differences in the flagging of prior results with similar values performed with this method. Interpretation of those prior results can be made in the context of the updated reference intervals.    ALT 15 0 - 70 U/L      Comment:      Reference intervals for this test were updated on 6/12/2023 to more accurately reflect our healthy population. There may be differences in the flagging of prior results with  similar values performed with this method. Interpretation of those prior results can be made in the context of the updated reference intervals.      Protein Total 7.5 6.4 - 8.3 g/dL    Albumin 3.7 3.5 - 5.2 g/dL    Bilirubin Total 1.1 <=1.2 mg/dL       If you have any questions or concerns, please call the clinic at the number listed above.       Sincerely,      GABRIEL Lenz CNP

## 2024-01-09 NOTE — PATIENT INSTRUCTIONS
Needs right leg to be wrapped to knee    Put in an order for urology consult    Will send orders to [facility] for catheter care    Re-ordered tamsulosin

## 2024-01-10 NOTE — TELEPHONE ENCOUNTER
Patient Contacted for the patient to call back and schedule the following:    Appointment type: New uro  Provider: aBo  Return date: Next available  Pt fell and is taken to [facility]. Will f/up in a week

## 2024-01-10 NOTE — TELEPHONE ENCOUNTER
[facility]    Phone Message    May a detailed message be left on voicemail: yes     Reason for Call: Other: Patienet is having issues with urinary retention recommended he follow up with urology for assessement and treatment plan, patient does have homecare coming in to help with cares. Writer sending encounter message for Dx retention per guideline instructions for clinic review and follow-up with Pt for scheduling. Thank you!     Action Taken: Message routed to:  [facility] URO    Travel Screening: Not Applicable

## 2024-01-15 NOTE — PROGRESS NOTES
Clinic Care Coordination Contact  Care Coordination Transition Communication    Clinical Data: Patient was hospitalized at [facility] from 1/10/24 to 1/14/24 with diagnosis of Right hip arthroplasty.     Assessment: Patient has transitioned to TCU/ARU for short term rehabilitation:    Facility Name: [facility] ([facility]  [address] [facility] [address]   [phone]   [phone]  Transition Communication:  Notified facility of Primary Care- Care Coordination support via  phone.    Plan: Care Coordinator will await notification from facility staff informing of patient's discharge plans/needs. Care Coordinator will review chart and outreach to facility staff every 4 weeks and as needed.     Sp Delaney MSN, RN, PHN, CCM   Primary Care Clinical RN Care Coordinator  [facility]  1/15/2024   1:59 PM  [email or web address]  Office: [phone]

## 2024-01-17 NOTE — TELEPHONE ENCOUNTER
Patient Contacted for the patient to call back and schedule the following:    Appointment type: New Uro  Provider: Bao  Return date: 2/1

## 2024-01-17 NOTE — TELEPHONE ENCOUNTER
Patient Contacted for the patient to call back and schedule the following:    Appointment type: New uro  Provider: Bao  Return date: Next available  Need to call [facility] to schedule then call wife to update about appts.

## 2024-01-18 NOTE — TELEPHONE ENCOUNTER
Reason for Visit: Consult on Urinary retention, BPH, [place]    Diagnosis: Urinary retention, BPH    Relevant information: ED referral by Rosalinda Mayberry MD, on 01/10/24, From [facility]    Records/imaging/labs/orders: All available in Epic    Pt called: No need for a call    At Rooming: Standard    He Schmitt MA  1/18/2024  2:14 PM

## 2024-01-19 NOTE — TELEPHONE ENCOUNTER
MEDICAL RECORDS REQUEST   [facility]  Urology Clinic  [address] [place] [address]  PHONE: [phone]  Fax: [phone]        FUTURE VISIT INFORMATION                                                   Emigdio CamdenNEIL luque: 3/17/1926 scheduled for future visit at [facility]    APPOINTMENT INFORMATION:  Date: 2024  Provider:  Corazon Gore PA  Reason for Visit/Diagnosis: urinary retention    REFERRAL INFORMATION:  Referring provider:  Jamila Bob APRN CNP in  INTERNAL MEDICINE      RECORDS REQUESTED FOR VISIT                                                     NOTES  STATUS/DETAILS   OFFICE NOTE from referring provider  yes, 2024 -- Jamila Bob APRN CNP in  INTERNAL MEDICINE   MEDICATION LIST  yes   LABS     URINALYSIS (UA)  yes   IMAGES  yes, 2023, 2023 -- CT ABD PELVIS  2023 -- US ABD       PRE-VISIT CHECKLIST      Joint diagnostic appointment coordinated correctly          (ensure right order & amount of time) Yes   RECORD COLLECTION COMPLETE Yes

## 2024-02-13 NOTE — TELEPHONE ENCOUNTER
Reason for Call:  Form, our goal is to have forms completed with 72 hours, however, some forms may require a visit or additional information.    Type of letter, form or note:  Home Health Certification    Who is the form from?: Home care    Where did the form come from: form was faxed in    What clinic location was the form placed at?: [facility]    Where the form was placed: Given to physician    What number is listed as a contact on the form?: [phone]       Call taken on 2/13/2024 at 12:07 PM by Francisca Meneses

## 2024-02-13 NOTE — TELEPHONE ENCOUNTER
[facility] received and given to Dr. Roberts to sign for Jamila Bob CNP who is out of the office until Monday, February 19th, 2024.  Francisca Meneses,

## 2024-02-19 PROBLEM — Z09 HOSPITAL DISCHARGE FOLLOW-UP: Status: ACTIVE | Noted: 2024-01-01

## 2024-02-19 NOTE — PATIENT INSTRUCTIONS
Patient has referral for urology I placed an order in January, had phone pre visit on 2/1/24 per chart

## 2024-02-19 NOTE — PROGRESS NOTES
Assessment & Plan     (Z09) Hospital discharge follow-up  (primary encounter diagnosis)  Comment: Patient seen in clinic today for hospital follow up. Patient states he is feeling well and is using a walker for ambulation and does well. Patient denies any new concerns. Patient has torres catheter in place and is using a leg bag, Urine is noted to be clear and yellow. RLE noted to have 2+ pitted edema with scabs mid calf. Skin on BLE is dry and scaly discussed using lotion which he states he does daily.  Plan: Will update with any new concerns     (R33.9) Urinary retention  Comment: Torres catheter in place. Order was placed in January for urology to follow up for urinary retention. Patient has concerns about seeing so many doctors but explained to patient that it is necessary to follow up with concerns for retention and long term catheter use.   Plan: Follow up with urology for urinary retention. Is being monitored with home care for replacement of catheter at this time.     (L85.3) Dry skin  Comment: Patient noted to have dry skin on BLE. Discussed applying lotion. No new wounds noted, has two large scabs on RLE no s/sx of infection noted.   Plan: Will update with any new concerns.     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   Plan: Update with any new concerns    (R53.1) Generalized weakness  Comment: Good strength in BLE ambulating well with walker.   Plan: Update with any new concerns.    (S72.001A) Closed displaced fracture of right femoral neck (H)  Comment: Fracture has healed well no new concerns noted this visit ambulating well.   Plan: Update with any new concerns,     Review of prior external note(s) from - CareEverywhere information from [facility] reviewed      MED REC REQUIRED  Post Medication Reconciliation Status:       MEDICATIONS:  Continue  current medications without change    Jose Beal is a 89 year old, presenting for the following health issues:  Hospital F/U        2/19/2024     9:46 AM   Additional Questions   Roomed by Bianca wynn Follow-up Visit:    Hospital/Nursing Home/ Rehab Facility:  [facility]  Date of Admission: 01/10/24  Date of Discharge: 01/14/24  Reason(s) for Admission: Closed displaced fracture of right femoral neck (HC) (Primary Dx);   Closed right hip fracture, initial encounter (HC);   Skin tear of right elbow without complication, initial encounter;   Acute pain of right shoulder;   Sandoval catheter in place prior to arrival for Urinary retention;   Constipation, unspecified constipation type     Was your hospitalization related to COVID-19? No   Problems taking medications regularly:  None  Medication changes since discharge: None  Problems adhering to non-medication therapy:  None    Summary of hospitalization:  CareEverywhere information obtained and reviewed  Diagnostic Tests/Treatments reviewed.  Follow up needed: none  Other Healthcare Providers Involved in Patient s Care:         Homecare and Physical Therapy  Update since discharge: improved.         Plan of care communicated with patient               Review of Systems  Constitutional, HEENT, cardiovascular, pulmonary, gi and gu systems are negative, except as otherwise noted.      Objective    /78   Pulse 72   Temp 98.2  F (36.8  C) (Temporal)   Resp 16   Wt 60.8 kg (134 lb)   SpO2 99%   BMI 20.99 kg/m    Body mass index is 20.99 kg/m .  Physical Exam  Constitutional:       Appearance: Normal appearance.   HENT:      Head: Normocephalic.      Ears:      Comments: Very hard of hearing      Nose: Nose normal.      Mouth/Throat:      Mouth: Mucous membranes are moist.   Eyes:      Pupils: Pupils are equal, round, and reactive to light.   Cardiovascular:      Rate and Rhythm: Normal rate and regular rhythm.      Pulses: Normal pulses.       Heart sounds: Normal heart sounds. No murmur heard.     No friction rub. No gallop.   Pulmonary:      Effort: Pulmonary effort is normal. No respiratory distress.      Breath sounds: Normal breath sounds. No stridor. No wheezing or rhonchi.   Abdominal:      General: Bowel sounds are normal. There is no distension.      Palpations: There is no mass.      Tenderness: There is no abdominal tenderness. There is no guarding or rebound.      Hernia: No hernia is present.   Musculoskeletal:         General: No swelling, tenderness, deformity or signs of injury. Normal range of motion.      Right lower leg: Edema present.   Skin:     General: Skin is warm and dry.      Coloration: Skin is not jaundiced or pale.      Findings: Lesion present. No bruising or erythema.   Neurological:      General: No focal deficit present.      Mental Status: He is alert and oriented to person, place, and time.   Psychiatric:         Mood and Affect: Mood normal.         Behavior: Behavior normal.         Thought Content: Thought content normal.         Judgment: Judgment normal.                  Signed Electronically by: GABRIEL Lenz CNP

## 2024-02-27 NOTE — LETTER
[facility] CARE COORDINATION  [address]  [place] [address]    February 27, 2024    Emigdio Hannah  [address] [place], [place] [address] [address]      Dear Emigdio,    I am a clinic care coordinator who works with GABRIEL Lenz CNP with the [facility]. I wanted to introduce myself and provide you with my contact information for you to be able to call me with any questions or concerns. Below is a description of clinic care coordination and how I can further assist you.       The clinic care coordination team is made up of a registered nurse, , financial resource worker and community health worker who understand the health care system. The goal of clinic care coordination is to help you manage your health and improve access to the health care system. Our team works alongside your provider to assist you in determining your health and social needs. We can help you obtain health care and community resources, providing you with necessary information and education. We can work with you through any barriers and develop a care plan that helps coordinate and strengthen the communication between you and your care team.  Our services are voluntary and are offered without charge to you personally.    Please feel free to contact me with any questions or concerns regarding care coordination and what we can offer.      We are focused on providing you with the highest-quality healthcare experience possible.    Sincerely,     Sp Delaney MSN, RN, PHN, CCM   Primary Care Clinical RN Care Coordinator  [facility]  2/27/2024   2:41 PM  [email or web address]  Office: [phone]

## 2024-02-27 NOTE — PROGRESS NOTES
Clinic Care Coordination Contact  [facility]/Voicemail    Clinical Data: Care Coordinator Outreach    Outreach Documentation Number of Outreach Attempt   2/27/2024   2:39 PM 1       Unable to leave a message on patient's voicemail with call back information and requested return call.    Plan: Care Coordinator will send care coordination introduction letter with care coordinator contact information and explanation of care coordination services via mail. Care Coordinator will try to reach patient again in 3-5 business days.    Sp Delaney MSN, RN, PHN, CCM   Primary Care Clinical RN Care Coordinator  [facility]  2/27/2024   2:40 PM  [email or web address]  Office: [phone]

## 2024-03-15 ENCOUNTER — PATIENT OUTREACH (OUTPATIENT)
Dept: CARE COORDINATION | Facility: CLINIC | Age: 89
End: 2024-03-15
Payer: COMMERCIAL

## 2024-03-15 NOTE — PROGRESS NOTES
Clinic Care Coordination Contact  Care Coordination Clinician Chart Review    Situation: Patient chart reviewed by care coordinator.    Background:  The patient had been in the hospital and in the TCU.  The RN CC was monitoring for discharge    Assessment: Upon chart review, patient is not a candidate for Primary Care Clinic Care Coordination enrollment due to reason stated below:  Patient has passed away.    Plan/Recommendations: Clinic Care Coordination Referral/order cancelled. RN/SW CC will perform no further monitoring/outreaches at this time and will remain available as needed. If new needs arise, a new Care Coordination Referral may be placed.    Sp Delaney MSN, RN, PHN, CCM   Primary Care Clinical RN Care Coordinator  [facility]  3/15/2024   9:28 AM  [email or web address]  Office: [phone]

## (undated) DEVICE — ENDO NDL ASPIRATION 19GA FLEX SLIMLINE EXPECT EUS M00555530

## (undated) DEVICE — SUCTION MANIFOLD NEPTUNE 2 SYS 4 PORT 0702-020-000

## (undated) DEVICE — ENDO BITE BLOCK ADULT OMNI-BLOC

## (undated) DEVICE — ESU GROUND PAD ADULT W/CORD E7507

## (undated) DEVICE — SPECIMEN CONTAINER 3OZ W/FORMALIN 59901

## (undated) DEVICE — ENDO CAP AND TUBING STERILE FOR ENDOGATOR  100130

## (undated) DEVICE — PACK ENDOSCOPY GI CUSTOM UMMC

## (undated) DEVICE — ENDO TUBING CO2 SMARTCAP STERILE DISP 100145CO2EXT

## (undated) DEVICE — KIT CONNECTOR FOR OLYMPUS ENDOSCOPES DEFENDO 100310

## (undated) DEVICE — KIT ENDO FIRST STEP DISINFECTANT 200ML W/POUCH EP-4

## (undated) DEVICE — ENDO CATH BALLOON DILATION HURRICAINE 10MMX4CM M00545960

## (undated) DEVICE — SOL WATER IRRIG 1000ML BOTTLE 2F7114

## (undated) RX ORDER — CEFTRIAXONE SODIUM 1 G
VIAL (EA) INJECTION
Status: DISPENSED
Start: 2023-01-01

## (undated) RX ORDER — SODIUM CHLORIDE, SODIUM LACTATE, POTASSIUM CHLORIDE, CALCIUM CHLORIDE 600; 310; 30; 20 MG/100ML; MG/100ML; MG/100ML; MG/100ML
INJECTION, SOLUTION INTRAVENOUS
Status: DISPENSED
Start: 2023-01-01